# Patient Record
Sex: MALE | Race: WHITE | Employment: OTHER | URBAN - NONMETROPOLITAN AREA
[De-identification: names, ages, dates, MRNs, and addresses within clinical notes are randomized per-mention and may not be internally consistent; named-entity substitution may affect disease eponyms.]

---

## 2017-01-23 ENCOUNTER — OFFICE VISIT (OUTPATIENT)
Dept: FAMILY MEDICINE CLINIC | Age: 72
End: 2017-01-23

## 2017-01-23 VITALS
BODY MASS INDEX: 26.16 KG/M2 | DIASTOLIC BLOOD PRESSURE: 72 MMHG | WEIGHT: 167 LBS | RESPIRATION RATE: 16 BRPM | SYSTOLIC BLOOD PRESSURE: 132 MMHG | HEART RATE: 74 BPM

## 2017-01-23 DIAGNOSIS — M25.562 LATERAL KNEE PAIN, LEFT: Primary | ICD-10-CM

## 2017-01-23 DIAGNOSIS — M25.562 ACUTE PAIN OF LEFT KNEE: ICD-10-CM

## 2017-01-23 DIAGNOSIS — Z12.11 COLON CANCER SCREENING: ICD-10-CM

## 2017-01-23 PROCEDURE — 99213 OFFICE O/P EST LOW 20 MIN: CPT | Performed by: FAMILY MEDICINE

## 2017-01-23 ASSESSMENT — ENCOUNTER SYMPTOMS
SHORTNESS OF BREATH: 0
SINUS PRESSURE: 0
RHINORRHEA: 0
WHEEZING: 0
ABDOMINAL PAIN: 0
VOMITING: 0
SORE THROAT: 0
DIARRHEA: 0
CHEST TIGHTNESS: 0
COUGH: 0
ALLERGIC/IMMUNOLOGIC NEGATIVE: 1
CONSTIPATION: 0
BACK PAIN: 0
NAUSEA: 0
BLOOD IN STOOL: 0

## 2017-02-01 ENCOUNTER — TELEPHONE (OUTPATIENT)
Dept: FAMILY MEDICINE CLINIC | Age: 72
End: 2017-02-01

## 2017-02-01 DIAGNOSIS — M25.562 ACUTE PAIN OF LEFT KNEE: Primary | ICD-10-CM

## 2017-02-10 ENCOUNTER — HOSPITAL ENCOUNTER (OUTPATIENT)
Dept: MRI IMAGING | Age: 72
Discharge: HOME | End: 2017-02-10
Attending: ORTHOPAEDIC SURGERY | Admitting: ORTHOPAEDIC SURGERY

## 2017-02-10 ENCOUNTER — OFFICE VISIT (OUTPATIENT)
Dept: ORTHOPEDIC SURGERY | Age: 72
End: 2017-02-10

## 2017-02-10 VITALS — RESPIRATION RATE: 16 BRPM | HEIGHT: 67 IN | BODY MASS INDEX: 26.21 KG/M2 | WEIGHT: 167 LBS

## 2017-02-10 DIAGNOSIS — S83.282A ACUTE TEAR LATERAL MENISCUS, LEFT, INITIAL ENCOUNTER: ICD-10-CM

## 2017-02-10 DIAGNOSIS — S83.282A TEAR OF LATERAL MENISCUS OF LEFT KNEE, UNSPECIFIED TEAR TYPE, UNSPECIFIED WHETHER OLD OR CURRENT TEAR, INITIAL ENCOUNTER: Primary | ICD-10-CM

## 2017-02-10 DIAGNOSIS — M17.12 PRIMARY OSTEOARTHRITIS OF LEFT KNEE: ICD-10-CM

## 2017-02-10 DIAGNOSIS — M25.562 LEFT KNEE PAIN, UNSPECIFIED CHRONICITY: ICD-10-CM

## 2017-02-10 DIAGNOSIS — S83.282A OTHER TEAR OF LATERAL MENISCUS, CURRENT INJURY, LEFT KNEE, INITIAL ENCOUNTER: ICD-10-CM

## 2017-02-10 PROCEDURE — 99204 OFFICE O/P NEW MOD 45 MIN: CPT | Performed by: ORTHOPAEDIC SURGERY

## 2017-02-10 ASSESSMENT — ENCOUNTER SYMPTOMS
RESPIRATORY NEGATIVE: 1
BLURRED VISION: 1
CONSTIPATION: 1

## 2017-02-13 ENCOUNTER — TELEPHONE (OUTPATIENT)
Dept: ORTHOPEDIC SURGERY | Age: 72
End: 2017-02-13

## 2017-02-15 ENCOUNTER — OFFICE VISIT (OUTPATIENT)
Dept: ORTHOPEDIC SURGERY | Age: 72
End: 2017-02-15

## 2017-02-15 VITALS — RESPIRATION RATE: 16 BRPM | BODY MASS INDEX: 26.21 KG/M2 | HEIGHT: 67 IN | WEIGHT: 167 LBS

## 2017-02-15 DIAGNOSIS — M17.12 PRIMARY OSTEOARTHRITIS OF LEFT KNEE: Primary | ICD-10-CM

## 2017-02-15 DIAGNOSIS — M25.562 ACUTE PAIN OF LEFT KNEE: ICD-10-CM

## 2017-02-15 PROCEDURE — 20610 DRAIN/INJ JOINT/BURSA W/O US: CPT | Performed by: ORTHOPAEDIC SURGERY

## 2017-02-15 PROCEDURE — 99213 OFFICE O/P EST LOW 20 MIN: CPT | Performed by: ORTHOPAEDIC SURGERY

## 2017-02-21 ENCOUNTER — HOSPITAL ENCOUNTER (OUTPATIENT)
Dept: PHYSICAL THERAPY | Age: 72
Discharge: HOME | End: 2017-02-28
Attending: ORTHOPAEDIC SURGERY | Admitting: ORTHOPAEDIC SURGERY

## 2017-02-21 ASSESSMENT — PAIN DESCRIPTION - ORIENTATION: ORIENTATION: LEFT

## 2017-02-21 ASSESSMENT — PAIN DESCRIPTION - DESCRIPTORS: DESCRIPTORS: ACHING

## 2017-02-21 ASSESSMENT — PAIN DESCRIPTION - FREQUENCY: FREQUENCY: INTERMITTENT

## 2017-02-21 ASSESSMENT — PAIN DESCRIPTION - PAIN TYPE: TYPE: ACUTE PAIN

## 2017-02-21 ASSESSMENT — PAIN DESCRIPTION - LOCATION: LOCATION: KNEE

## 2017-02-21 ASSESSMENT — PAIN SCALES - GENERAL: PAINLEVEL_OUTOF10: 4

## 2017-03-01 ENCOUNTER — HOSPITAL ENCOUNTER (OUTPATIENT)
Dept: PHYSICAL THERAPY | Age: 72
Discharge: HOME | End: 2017-03-24
Attending: ORTHOPAEDIC SURGERY | Admitting: ORTHOPAEDIC SURGERY

## 2017-05-19 ENCOUNTER — TELEPHONE (OUTPATIENT)
Dept: ORTHOPEDIC SURGERY | Age: 72
End: 2017-05-19

## 2017-05-19 ENCOUNTER — OFFICE VISIT (OUTPATIENT)
Dept: ORTHOPEDIC SURGERY | Age: 72
End: 2017-05-19

## 2017-05-19 VITALS — BODY MASS INDEX: 26.21 KG/M2 | WEIGHT: 167 LBS | HEIGHT: 67 IN | RESPIRATION RATE: 16 BRPM

## 2017-05-19 DIAGNOSIS — S83.282A TEAR OF LATERAL MENISCUS OF LEFT KNEE, UNSPECIFIED TEAR TYPE, UNSPECIFIED WHETHER OLD OR CURRENT TEAR, INITIAL ENCOUNTER: Primary | ICD-10-CM

## 2017-05-19 DIAGNOSIS — M17.12 PRIMARY OSTEOARTHRITIS OF LEFT KNEE: ICD-10-CM

## 2017-05-19 PROCEDURE — 99214 OFFICE O/P EST MOD 30 MIN: CPT | Performed by: ORTHOPAEDIC SURGERY

## 2017-05-19 PROCEDURE — 20610 DRAIN/INJ JOINT/BURSA W/O US: CPT | Performed by: ORTHOPAEDIC SURGERY

## 2017-05-31 ENCOUNTER — TELEPHONE (OUTPATIENT)
Dept: ORTHOPEDIC SURGERY | Age: 72
End: 2017-05-31

## 2017-09-25 ENCOUNTER — OFFICE VISIT (OUTPATIENT)
Dept: ORTHOPEDIC SURGERY | Age: 72
End: 2017-09-25

## 2017-09-25 VITALS — WEIGHT: 167 LBS | BODY MASS INDEX: 26.21 KG/M2 | RESPIRATION RATE: 16 BRPM | HEIGHT: 67 IN

## 2017-09-25 DIAGNOSIS — S83.242A ACUTE MEDIAL MENISCUS TEAR OF LEFT KNEE, INITIAL ENCOUNTER: Primary | ICD-10-CM

## 2017-09-25 DIAGNOSIS — M17.12 PRIMARY OSTEOARTHRITIS OF LEFT KNEE: ICD-10-CM

## 2017-09-25 DIAGNOSIS — Z01.818 PREOP EXAMINATION: ICD-10-CM

## 2017-09-25 PROCEDURE — 99214 OFFICE O/P EST MOD 30 MIN: CPT | Performed by: ORTHOPAEDIC SURGERY

## 2017-09-25 PROCEDURE — 73562 X-RAY EXAM OF KNEE 3: CPT | Performed by: ORTHOPAEDIC SURGERY

## 2017-09-27 ENCOUNTER — TELEPHONE (OUTPATIENT)
Dept: ORTHOPEDIC SURGERY | Age: 72
End: 2017-09-27

## 2017-09-27 DIAGNOSIS — S83.242A ACUTE MEDIAL MENISCUS TEAR OF LEFT KNEE, INITIAL ENCOUNTER: Primary | ICD-10-CM

## 2017-09-27 DIAGNOSIS — Z01.818 PREOP EXAMINATION: ICD-10-CM

## 2017-09-27 DIAGNOSIS — M17.12 PRIMARY OSTEOARTHRITIS OF LEFT KNEE: ICD-10-CM

## 2017-10-04 ENCOUNTER — TELEPHONE (OUTPATIENT)
Dept: ORTHOPEDIC SURGERY | Age: 72
End: 2017-10-04
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
## 2017-10-04 NOTE — TELEPHONE ENCOUNTER
Called patients insurance to pre cert CPT code 60586 for [address] 10/10/17.  Per automated system no pre cert is required K#[id number]

## 2017-10-05 ENCOUNTER — HOSPITAL ENCOUNTER (OUTPATIENT)
Dept: PREADMISSION TESTING | Age: 72
Discharge: HOME | End: 2017-10-05
Attending: ORTHOPAEDIC SURGERY | Admitting: ORTHOPAEDIC SURGERY

## 2017-10-05 VITALS
BODY MASS INDEX: 25.79 KG/M2 | RESPIRATION RATE: 16 BRPM | WEIGHT: 160.5 LBS | SYSTOLIC BLOOD PRESSURE: 141 MMHG | TEMPERATURE: 98.2 F | HEIGHT: 66 IN | DIASTOLIC BLOOD PRESSURE: 67 MMHG | OXYGEN SATURATION: 97 % | HEART RATE: 72 BPM

## 2017-10-05 LAB
ANION GAP SERPL CALCULATED.3IONS-SCNC: 11 MMOL/L (ref 4–16)
BUN BLDV-MCNC: 14 MG/DL (ref 6–23)
CALCIUM SERPL-MCNC: 9.6 MG/DL (ref 8.3–10.6)
CHLORIDE BLD-SCNC: 102 MMOL/L (ref 99–110)
CO2: 26 MMOL/L (ref 21–32)
CREAT SERPL-MCNC: 1 MG/DL (ref 0.9–1.3)
EKG ATRIAL RATE: 63 BPM
EKG DIAGNOSIS: NORMAL
EKG P AXIS: 18 DEGREES
EKG P-R INTERVAL: 192 MS
EKG Q-T INTERVAL: 390 MS
EKG QRS DURATION: 98 MS
EKG QTC CALCULATION (BAZETT): 399 MS
EKG R AXIS: -26 DEGREES
EKG T AXIS: 29 DEGREES
EKG VENTRICULAR RATE: 63 BPM
GFR AFRICAN AMERICAN: >60 ML/MIN/1.73M2
GFR NON-AFRICAN AMERICAN: >60 ML/MIN/1.73M2
GLUCOSE BLD-MCNC: 94 MG/DL (ref 70–140)
HCT VFR BLD CALC: 37.1 % (ref 42–52)
HEMOGLOBIN: 11.5 GM/DL (ref 13.5–18)
MCH RBC QN AUTO: 26.2 PG (ref 27–31)
MCHC RBC AUTO-ENTMCNC: 31 % (ref 32–36)
MCV RBC AUTO: 84.5 FL (ref 78–100)
PDW BLD-RTO: 14.5 % (ref 11.7–14.9)
PLATELET # BLD: 351 K/CU MM (ref 140–440)
PMV BLD AUTO: 9.9 FL (ref 7.5–11.1)
POTASSIUM SERPL-SCNC: 5.1 MMOL/L (ref 3.5–5.1)
RBC # BLD: 4.39 M/CU MM (ref 4.6–6.2)
SODIUM BLD-SCNC: 139 MMOL/L (ref 135–145)
WBC # BLD: 7.2 K/CU MM (ref 4–10.5)

## 2017-10-05 ASSESSMENT — PAIN SCALES - GENERAL: PAINLEVEL_OUTOF10: 0

## 2017-10-05 NOTE — PROGRESS NOTES
I called patient to verify arrival time of 0630 for 0730 surgery on 10- with Dr. Panfilo Jiang and [facility] location. Patient asked if he needed to have transportation home after the surgery. I explained to patient he must have a ride home and someone to check on him after his surgery. Patient stated he has no one who can do this at the present time but is working on finding a ride. Patient states his son, who lives in [place], is unable to provide assistance. I suggested to check with the [address] [place] [address] and [employer] for a ride and patient stated he was waiting to hear back from his neighbor. Plan is to check with patient again tomorrow, and if needed, on Monday 10- to ensure he has transportation and someone to check on him after surgery.

## 2017-10-05 NOTE — PROGRESS NOTES
Patient called back for [facility] address because he is trying to arrange transportation through [facility] [employer]. I asked patient again about having someone to check on him following the surgery and he stated he could take care of himself. I told patient he would not able to have the surgery if he did not have someone to check on him afterwards on the surgery day. He voiced understanding and stated he would find someone to check on him. Patient also asked about deducting the cost of crutches if he provided his own. I referred patient to the Billing department at [phone]. Plan is still to check with patient tomorrow and again Monday if needed.

## 2017-10-09 NOTE — H&P
tenderness, deformity or injury. Range of motion is unremarkable. There is no gross instability. There are no rashes, ulcerations or lesions. Strength and tone are normal.      MEDICAL DATA:   MRI LEFT KNEE 2/10/17      Impression   1. No evidence of internal derangement. 2. Mild medial compartment degenerative changes. 3. Trace joint effusion and small popliteal cyst.         Knee x-ray:  3 view(s) of the left knee were obtained and reviewed and show No fracture, Normal alignment, No foreign body      ASSESSMENT:     1. Acute medial meniscus tear of left knee, initial encounter     2. Primary osteoarthritis of left knee  XR KNEE LEFT (MIN 4 VIEWS)   3. Preop examination           PLAN:   Diagnostic and treatment options discussed. Diagnosis explained. Natural history discussed. Patient's questions answered. No meds list provided   Consent signed today   Follow up as scheduled for surgery     Informed Consent for -  Left knee arthroscopic partial medial meniscectomy and related procedures per findings: The risks, benefits, potential complications, and potential outcomes of surgical and non-surgical treatment(s) were discussed at length with the patient and family members present. They understood and have had all of their questions answered to their satisfaction. They understand that no warrantee can be given regarding the outcome of the treatment(s). They agree to proceed despite all known and unknown risks and associated convalescence.        HISTORY AND PHYSICAL UPDATE:    H&P reviewed. The patient was examined and there are no changes to the H&P.       Electronically signed by Hans Martinez DO on 10/10/2017 at 7:29 AM

## 2017-10-10 ENCOUNTER — TELEPHONE (OUTPATIENT)
Dept: ORTHOPEDIC SURGERY | Age: 72
End: 2017-10-10

## 2017-10-10 ENCOUNTER — HOSPITAL ENCOUNTER (OUTPATIENT)
Dept: SURGERY | Age: 72
Discharge: HOME | End: 2017-10-10
Attending: ORTHOPAEDIC SURGERY | Admitting: ORTHOPAEDIC SURGERY

## 2017-10-10 VITALS
HEART RATE: 72 BPM | DIASTOLIC BLOOD PRESSURE: 72 MMHG | OXYGEN SATURATION: 100 % | TEMPERATURE: 97 F | BODY MASS INDEX: 26.82 KG/M2 | WEIGHT: 161 LBS | HEIGHT: 65 IN | RESPIRATION RATE: 16 BRPM | SYSTOLIC BLOOD PRESSURE: 128 MMHG

## 2017-10-10 PROCEDURE — 29881 ARTHRS KNE SRG MNISECTMY M/L: CPT | Performed by: ORTHOPAEDIC SURGERY

## 2017-10-10 RX ORDER — SODIUM CHLORIDE, SODIUM LACTATE, POTASSIUM CHLORIDE, CALCIUM CHLORIDE 600; 310; 30; 20 MG/100ML; MG/100ML; MG/100ML; MG/100ML
INJECTION, SOLUTION INTRAVENOUS CONTINUOUS
Status: DISCONTINUED | OUTPATIENT
Start: 2017-10-10 | End: 2017-10-11 | Stop reason: HOSPADM

## 2017-10-10 RX ORDER — SODIUM CHLORIDE 0.9 % (FLUSH) 0.9 %
10 SYRINGE (ML) INJECTION PRN
Status: DISCONTINUED | OUTPATIENT
Start: 2017-10-10 | End: 2017-10-11 | Stop reason: HOSPADM

## 2017-10-10 RX ORDER — HYDROCODONE BITARTRATE AND ACETAMINOPHEN 5; 325 MG/1; MG/1
1 TABLET ORAL ONCE
Status: DISCONTINUED | OUTPATIENT
Start: 2017-10-10 | End: 2017-10-11 | Stop reason: HOSPADM

## 2017-10-10 RX ORDER — HYDROCODONE BITARTRATE AND ACETAMINOPHEN 5; 325 MG/1; MG/1
1 TABLET ORAL EVERY 4 HOURS PRN
Qty: 40 TABLET | Refills: 0 | Status: SHIPPED | OUTPATIENT
Start: 2017-10-10 | End: 2018-01-17

## 2017-10-10 RX ORDER — SODIUM CHLORIDE 0.9 % (FLUSH) 0.9 %
10 SYRINGE (ML) INJECTION EVERY 12 HOURS SCHEDULED
Status: DISCONTINUED | OUTPATIENT
Start: 2017-10-10 | End: 2017-10-11 | Stop reason: HOSPADM

## 2017-10-10 RX ORDER — IBUPROFEN 400 MG/1
800 TABLET ORAL ONCE
Status: DISCONTINUED | OUTPATIENT
Start: 2017-10-10 | End: 2017-10-11 | Stop reason: HOSPADM

## 2017-10-10 RX ORDER — ONDANSETRON 2 MG/ML
4 INJECTION INTRAMUSCULAR; INTRAVENOUS EVERY 6 HOURS PRN
Status: DISCONTINUED | OUTPATIENT
Start: 2017-10-10 | End: 2017-10-11 | Stop reason: HOSPADM

## 2017-10-10 RX ADMIN — SODIUM CHLORIDE, SODIUM LACTATE, POTASSIUM CHLORIDE, CALCIUM CHLORIDE: 600; 310; 30; 20 INJECTION, SOLUTION INTRAVENOUS at 07:25

## 2017-10-10 ASSESSMENT — PAIN SCALES - GENERAL
PAINLEVEL_OUTOF10: 0

## 2017-10-10 ASSESSMENT — PAIN - FUNCTIONAL ASSESSMENT: PAIN_FUNCTIONAL_ASSESSMENT: 0-10

## 2017-10-10 NOTE — PROGRESS NOTES
[id number] Transportation returned call that they received message to head this way. Pt will be ready for  after he voids. 0945 Pt has feeling in bilat legs. Push and pull equal with strength. Denies pain, nausea. 8986 Up to bathroom with assistance. Call light in reach. 0061 Returned to room. Unable to void. More coffee given. Pt denies needs. Call light in reach. Warm blankets given.
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
## 2017-10-10 NOTE — OP NOTE
prominences were  well padded. Left lower extremity was then sterilely prepped and  draped in usual fashion after being placed in an arthroscopic knee  talamantes. A standard anterolateral arthroscopic portal was established. The arthroscope was inserted into the patellofemoral joint. The knee  was inflated with arthroscopic fluid and well visualized. There was  grade III chondromalacia of the small isolated area of the central  portion of the patella articular surface. There does not appear to be  any significant chondral surface wear on the opposing articular  surface of the femur. Mild synovitis was present in this area. The  arthroscope was advanced down the medial gutter. No loose bodies were  identified. The medial compartment was gapped and the arthroscope was  directed here. Generalized degenerative prostheses were identified in  this compartment. There was grade II chondromalacia of the medial  tibial plateau and grade III chondromalacia of the weightbearing  surface of the medial femoral condyle. The medial meniscus is intact. The knee was flexed. Anterior cruciate ligament was visualized. This  was normal in appearance. The lateral compartment was gapped and the  arthroscope was directed here. There was a tear of the free edge of  the mid portion of the lateral meniscus that does not appear to track  back into the more peripheral portions of the meniscus. The chondral  surfaces of the lateral tibial plateau and lateral femoral condyle  were intact.   The arthroscope was then brought back over to the medial  compartment and using an outside-in technique with an 18-gauge spinal  needle and an 11-blade scalpel, standard anteromedial arthroscopic  portal was established as the working portal.  The arthroscopic shaver  was inserted through this newly created medial portal.  The shaver and  camera were redirected to the lateral compartment where the partial  lateral meniscectomy was performed back to stable margins. The medial  compartment was gapped and the arthroscope and shaver were directed to  this area. The arthroscopic shaver was used to perform chondroplasty  of the medial femoral condyle back to stable margins. The knee was  then extended. The arthroscope was advanced back up to face of the  femoral condyles and into the patellofemoral compartment. The  arthroscopic shaver was used to perform the chondroplasty of the  patella back to stable margins. At this time, arthroscopic fluid was  evacuated from the knee. 20 mL of 0.25% Marcaine with epinephrine was  injected into the knee for postoperative pain and bleeding control. Instrumentation was removed from the knee. Portals were closed with  nylon sutures. Sterile dressings were applied and the patient was  sent to recovery in stable condition. DISPOSITION:  Patient was given written instructions on how to care  for himself after the surgery. He was given a written remainder to  follow up with me in the office in approximately two weeks. He was  given a written prescription for Norco 5/325 one p.o. q. 4h. p.r.n.  pain number 40 with no refills. Intraoperative and postoperative  course was discussed with the patient in the recovery area. He  understood and had all of his questions answered.       CASSANDRA MARTINES DO    D: 10/10/2017 8:42:11       T: 10/10/2017 9:29:20     [email or web address]  Job#: [id number]     Doc#: [id number]    CC:  Allan Aguila MD

## 2017-10-11 ENCOUNTER — OFFICE VISIT (OUTPATIENT)
Dept: ORTHOPEDIC SURGERY | Age: 72
End: 2017-10-11

## 2017-10-11 DIAGNOSIS — Z98.890 POST-OPERATIVE STATE: Primary | ICD-10-CM

## 2017-10-11 DIAGNOSIS — S83.242A ACUTE MEDIAL MENISCUS TEAR OF LEFT KNEE, INITIAL ENCOUNTER: ICD-10-CM

## 2017-10-11 PROCEDURE — 99024 POSTOP FOLLOW-UP VISIT: CPT | Performed by: ORTHOPAEDIC SURGERY

## 2017-10-11 NOTE — PROGRESS NOTES
Scribe Authentication Statement  Laila Geiger scribed portions of this documentation for and in the presence of Dr. Yunior Frazier DO on 10/11/17 at 2:22 PM.    Provider Authentication Statement:  ITony DO, personally performed the services described in this documentation and they were scribed in my presence by the above listed scribe. The documentation is both accurate and complete. ORTHOPEDIC FOLLOW UP AFTER SURGERY    HISTORY OF PRESENT ILLNESS (HPI):   Surendra Stoner is a 74y.o. year old male who is here for follow up of:  1. Post-operative state    2. Acute medial meniscus tear of left knee, initial encounter        Procedure Name: PROCEDURE NAMES:  1. Left knee arthroscopic partial lateral meniscectomy. 2.  Left knee arthroscopic chondroplasty of the medial femoral  condyle. 3.  Left knee arthroscopic chondroplasty of the patella. Surgery Date: October/10/2017   Post-Op Number: 1 day(s)   How has the problem changed since the last visit: intermittent   How have the associated manifestations changed since the last visit: stiffness and swelling  waxing and waning   New associated manifestations are: none   Adverse effects or complications: Tightness in back of knee. Denies pain or tightness in the calf or lower leg. Denies swelling or inability to walk. Denies calf tenderness. Other Comments:  patient would like dr Darion Schmitt to look at the calf     Current Outpatient Prescriptions   Medication Sig Dispense Refill    HYDROcodone-acetaminophen (NORCO) 5-325 MG per tablet Take 1 tablet by mouth every 4 hours as needed for Pain . 40 tablet 0    Multiple Vitamins-Minerals (ICAPS AREDS 2 PO) Take by mouth every morning Over The Counter      diclofenac sodium (VOLTAREN) 1 % GEL Apply 4 g topically 4 times daily 5 Tube 0     No current facility-administered medications for this visit.         ORTHOPEDIC EXAM:     [] Left Upper Extremity [] Right Upper Extremity  [] Left Lower Extremity [] Right Lower Extremity    Inspection:  Incision: [x]Healing well without significant drainage, dehiscence, or significant erythema. []Erythema greater than expected:  []Dehiscence:  []Drainage:   Circulation: [x] Warm, well perfused, good capillary refill  [] Cool  [] Sluggish cap refill  [] Other:   Skin: [x] Intact without discoloration  [] Pale  [] Erythematous  [] Ecchymotic   [] Maceration  [] Other:  [] Wound:   Cast/Splint: [x] None  [] Intact, dry, and functional without any unexpected wear  [] Poorly fitting  [] Wet  [] Foul smelling  [] Broken  [] Patient altered  [] Foreign body within  [] Soft  [] Other:     Palpation:  Tenderness: [] None  [x] No more than expected  [] Pertinent positives & negatives:   Swelling: [] None  [x] No more than expected  [] Pertinent positives & negatives:   Calor (Heat) [x] None  [] No more than expected  [] Location:   Other Findings: -No palpable cords in the calf. No calf tenderness. No calf swelling. No erythema. Range of Motion:  [] ROM deferred due to recent surgery   Active ROM: [x] Full functional  [] Measured =   [] Mildly limited  [] Moderately limited  [] Severely limited   Passive ROM: [x] Full functional  [] Measured =  [] Mildly limited  [] Moderately limited  [] Severely limited     Motor Function:  [x]Intact. No focal motor deficits. []Impaired:       Sensation:  Sensation to light touch: [x] Intact  [] Diminished:  [] Absent:       MEDICAL DATA:   Imaging:  No x-rays taken in the office today. ASSESSMENT / PLAN:     1. Post-operative state     2.  Acute medial meniscus tear of left knee, initial encounter       Keep scheduled f/u appointment  Keep schedule PT appointments      Electronically signed by Madalyn Vasquez DO on 10/11/2017 at 2:34 PM

## 2017-10-12 ENCOUNTER — HOSPITAL ENCOUNTER (OUTPATIENT)
Dept: PHYSICAL THERAPY | Age: 72
Discharge: HOME | End: 2017-10-31
Attending: ORTHOPAEDIC SURGERY | Admitting: ORTHOPAEDIC SURGERY

## 2017-10-12 ASSESSMENT — PAIN DESCRIPTION - PAIN TYPE: TYPE: SURGICAL PAIN

## 2017-10-12 ASSESSMENT — PAIN SCALES - GENERAL: PAINLEVEL_OUTOF10: 2
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
## 2017-10-16 NOTE — ANESTHESIA POST-OP
Anesthesia Post-op Note    Patient: Adelso Pride  MRN: [id number]  YOB: 1945  Date of evaluation: 10/16/2017  Time:  4:27 PM     Procedure(s) Performed: Left Knee Arthroscopy    Last Vitals: /72   Pulse 72   Temp 97 °F (36.1 °C) (Temporal)   Resp 16   Ht 5' 5\" (1.651 m)   Wt 161 lb (73 kg)   SpO2 100%   BMI 26.79 kg/m²     Jluis Phase I:      Jluis Phase II: Jluis Score: 10    Anesthesia Post Evaluation    Final anesthesia type: general  Patient location during evaluation: PACU  Patient participation: complete - patient participated  Level of consciousness: awake and alert  Pain score: 0  Airway patency: patent  Nausea & Vomiting: no nausea and no vomiting  Complications: no  Cardiovascular status: hemodynamically stable  Respiratory status: acceptable  Hydration status: euvolemic        Denver Lemos CRNA  4:27 PM

## 2017-10-18 ENCOUNTER — TELEPHONE (OUTPATIENT)
Dept: FAMILY MEDICINE CLINIC | Age: 72
End: 2017-10-18

## 2017-10-18 ENCOUNTER — HOSPITAL ENCOUNTER (OUTPATIENT)
Dept: PHYSICAL THERAPY | Age: 72
Discharge: HOME | End: 2017-10-18
Admitting: ORTHOPAEDIC SURGERY

## 2017-10-18 NOTE — FLOWSHEET NOTE
Outpatient Physical Therapy           [place]           [] Phone: [phone]   Fax: [phone]  [facility]           [x] Phone: [phone]   Fax: [phone]    Physical Therapy Daily Treatment Note  Date:  10/18/2017    Patient Name:  Zoey Osorio    :  1945  MRN: [id number]  Restrictions/Precautions:   Diagnosis:   Diagnosis: post op arthroscopy - partial lateral menisectomy  Date of Surgery: 10/5/17  Treatment Diagnosis:    same  Insurance/Certification information:    Referring Physician:  Referring Practitioner: Dr. Olga Navarro  Next Doctor Visit:    Plan of care signed (Y/N): n   Visit# / total visits: 2 /4  Pain level: 0/10   Goals:          Long term goals  Time Frame for Long term goals : 4 weeks  Long term goal 1: I in home program.  Long term goal 2: up/down one floor of steps w reciprocal pattern. Long term goal 3: amb without antalgic pattern. Long term goal 4: squat without pain. Subjective:  Patient states that his knee is feeling good. Denies any pain with activities at home. Continues to have hiccups.       Any changes in Ambulatory Summary Sheet? n      Objective:      Good form noted with exercises    Exercises:  Exercise/Equipment Date 10/12/17 10/18/17 Date Date     Heel slide x10 w 10ct hold        SLR 2x10        bridging 2x10 Bridge w SLR      wall squats   20x       Standing hip 4 way  20x       Lateral step ups  6\" 20x        Check stair negotiation  5 laps with reciprocal pattern              Nustep    5 min, lev 5                                                                                                     Other Therapeutic Activities/Education:      Home Exercise Program:      Modality/intervention used:    [x] Therapeutic Exercise  [] Modalities:  [] Therapeutic Activity     [] Ultrasound  [] Elec  Stim  [] Gait Training      [] Cervical Traction [] Lumbar Traction  [] Neuromuscular Re-education    [] Cold/hotpack [] Iontophoresis   [x] Instruction in

## 2017-10-18 NOTE — TELEPHONE ENCOUNTER
Patient called stating he had surgery 10/10/2017 , and he has had hiccups ever since. Patient stated that he has tried OTC antacid and nothing works, he wanting to know if he can have something prescribed for it or does he have to come in? Please advise.

## 2017-10-23 ENCOUNTER — OFFICE VISIT (OUTPATIENT)
Dept: ORTHOPEDIC SURGERY | Age: 72
End: 2017-10-23

## 2017-10-23 DIAGNOSIS — Z98.890 POST-OPERATIVE STATE: Primary | ICD-10-CM

## 2017-10-23 PROCEDURE — 99024 POSTOP FOLLOW-UP VISIT: CPT | Performed by: PHYSICIAN ASSISTANT

## 2017-10-24 NOTE — PROGRESS NOTES
Franc Beltrán  [id number]  October 24, 2017    Chief Complaint   Patient presents with    Follow-up     L knee scope       Date of surgery:   October 10th, 2017    History:  Mr. Franc Beltrán is here in follow up regarding his left knee:  1. Left knee arthroscopic partial lateral meniscectomy. 2.  Left knee arthroscopic chondroplasty of the medial femoral  condyle. 3.  Left knee arthroscopic chondroplasty of the patella. He is doing rather well. He is having 2/10 pain. He has discontinued the assistive devices for ambulation. He denies chest pain, SOB, calf pain. No other issues. The patient was not seen in conjunction with PT today. Physical: He demonstrates appropriate mood and affect. The left leg exam:  The incisions are clean, dry, intact and nontender. Sutures intact. He has minimal swelling. There are No cords or calf tenderness. No significant calf/ankle edema. He is neurovascularly intact distally.     Impression: Status post above, doing well    Plan:   -sutures removed, Band-aids applied  -continue the PT protocol  -rest/ice/elevation as needed  -f/u sooner prn any issues    Matt Engle PA-C

## 2017-11-01 ENCOUNTER — HOSPITAL ENCOUNTER (OUTPATIENT)
Dept: PHYSICAL THERAPY | Age: 72
Discharge: HOME | End: 2017-11-30
Attending: ORTHOPAEDIC SURGERY | Admitting: ORTHOPAEDIC SURGERY

## 2018-01-17 ENCOUNTER — OFFICE VISIT (OUTPATIENT)
Dept: FAMILY MEDICINE CLINIC | Age: 73
End: 2018-01-17

## 2018-01-17 VITALS
BODY MASS INDEX: 29.22 KG/M2 | HEART RATE: 97 BPM | SYSTOLIC BLOOD PRESSURE: 162 MMHG | RESPIRATION RATE: 16 BRPM | WEIGHT: 175.6 LBS | TEMPERATURE: 99.1 F | DIASTOLIC BLOOD PRESSURE: 80 MMHG | OXYGEN SATURATION: 96 %

## 2018-01-17 DIAGNOSIS — J40 BRONCHITIS: Primary | ICD-10-CM

## 2018-01-17 DIAGNOSIS — Z12.11 SCREENING FOR COLON CANCER: ICD-10-CM

## 2018-01-17 DIAGNOSIS — I25.10 ASCVD (ARTERIOSCLEROTIC CARDIOVASCULAR DISEASE): ICD-10-CM

## 2018-01-17 DIAGNOSIS — I10 ESSENTIAL HYPERTENSION: ICD-10-CM

## 2018-01-17 DIAGNOSIS — R05.9 COUGH: ICD-10-CM

## 2018-01-17 PROBLEM — M17.12 PRIMARY OSTEOARTHRITIS OF LEFT KNEE: Status: RESOLVED | Noted: 2017-02-15 | Resolved: 2018-01-17

## 2018-01-17 PROBLEM — S83.282A TEAR OF LATERAL MENISCUS OF LEFT KNEE: Status: RESOLVED | Noted: 2017-05-19 | Resolved: 2018-01-17

## 2018-01-17 PROCEDURE — G8419 CALC BMI OUT NRM PARAM NOF/U: HCPCS | Performed by: FAMILY MEDICINE

## 2018-01-17 PROCEDURE — 1036F TOBACCO NON-USER: CPT | Performed by: FAMILY MEDICINE

## 2018-01-17 PROCEDURE — G8599 NO ASA/ANTIPLAT THER USE RNG: HCPCS | Performed by: FAMILY MEDICINE

## 2018-01-17 PROCEDURE — 3017F COLORECTAL CA SCREEN DOC REV: CPT | Performed by: FAMILY MEDICINE

## 2018-01-17 PROCEDURE — 4040F PNEUMOC VAC/ADMIN/RCVD: CPT | Performed by: FAMILY MEDICINE

## 2018-01-17 PROCEDURE — 99213 OFFICE O/P EST LOW 20 MIN: CPT | Performed by: FAMILY MEDICINE

## 2018-01-17 PROCEDURE — G8484 FLU IMMUNIZE NO ADMIN: HCPCS | Performed by: FAMILY MEDICINE

## 2018-01-17 PROCEDURE — G8427 DOCREV CUR MEDS BY ELIG CLIN: HCPCS | Performed by: FAMILY MEDICINE

## 2018-01-17 PROCEDURE — 1123F ACP DISCUSS/DSCN MKR DOCD: CPT | Performed by: FAMILY MEDICINE

## 2018-01-17 RX ORDER — PREDNISONE 20 MG/1
20 TABLET ORAL 2 TIMES DAILY
Qty: 10 TABLET | Refills: 0 | Status: SHIPPED | OUTPATIENT
Start: 2018-01-17 | End: 2018-01-24 | Stop reason: SDUPTHER

## 2018-01-17 RX ORDER — GUAIFENESIN AND CODEINE PHOSPHATE 100; 10 MG/5ML; MG/5ML
5 SOLUTION ORAL EVERY 4 HOURS PRN
Qty: 118 ML | Refills: 0 | Status: SHIPPED | OUTPATIENT
Start: 2018-01-17 | End: 2018-01-24 | Stop reason: SDUPTHER

## 2018-01-17 RX ORDER — AZITHROMYCIN 250 MG/1
TABLET, FILM COATED ORAL
Qty: 1 PACKET | Refills: 0 | Status: SHIPPED | OUTPATIENT
Start: 2018-01-17 | End: 2018-01-24 | Stop reason: SDUPTHER

## 2018-01-17 ASSESSMENT — ENCOUNTER SYMPTOMS
VOMITING: 0
SINUS PRESSURE: 0
NAUSEA: 0
ABDOMINAL PAIN: 0
COUGH: 1
DIARRHEA: 0
ALLERGIC/IMMUNOLOGIC NEGATIVE: 1
CHEST TIGHTNESS: 0
SORE THROAT: 0
SHORTNESS OF BREATH: 0
RHINORRHEA: 1
CONSTIPATION: 0
BACK PAIN: 0
BLOOD IN STOOL: 0
WHEEZING: 0

## 2018-01-17 ASSESSMENT — PATIENT HEALTH QUESTIONNAIRE - PHQ9
2. FEELING DOWN, DEPRESSED OR HOPELESS: 0
1. LITTLE INTEREST OR PLEASURE IN DOING THINGS: 0
SUM OF ALL RESPONSES TO PHQ QUESTIONS 1-9: 0
SUM OF ALL RESPONSES TO PHQ9 QUESTIONS 1 & 2: 0

## 2018-01-18 NOTE — PROGRESS NOTES
SUBJECTIVE:    HPI:   Essential hypertension  BP (!) 162/80 (Site: Right Arm, Position: Sitting, Cuff Size: Medium Adult)   Pulse 97   Temp 99.1 °F (37.3 °C)   Resp 16   Wt 175 lb 9.6 oz (79.7 kg)   SpO2 96%   BMI 29.22 kg/m²    Pressure up but using cold medicines and sick recheck 4 - 6 weeks    ASCVD (arteriosclerotic cardiovascular disease)  Hx tia, stable now , pt not interested in asa, statins    Bronchitis  Cough and congestion. Pt sick several weeks and worsening requesting med to ease symptoms      CHIEF COMPLAINT:    Chief Complaint   Patient presents with    Cough    Pharyngitis    Nasal Congestion       REVIEW OF SYSTEMS:    Review of Systems   Constitutional: Negative for activity change, fatigue and fever. HENT: Positive for rhinorrhea. Negative for congestion, ear pain, sinus pressure and sore throat. Eyes: Negative for visual disturbance. Respiratory: Positive for cough. Negative for chest tightness, shortness of breath and wheezing. Cardiovascular: Negative for chest pain, palpitations and leg swelling. Gastrointestinal: Negative for abdominal pain, blood in stool, constipation, diarrhea, nausea and vomiting. Endocrine: Negative. Genitourinary: Negative for dysuria, flank pain, hematuria and urgency. Musculoskeletal: Positive for arthralgias. Negative for back pain and joint swelling. L knee pain   Skin: Negative for rash. Allergic/Immunologic: Negative. Neurological: Negative for weakness, numbness and headaches. Hematological: Negative. Psychiatric/Behavioral: Negative. OBJECTIVE  PHYSICAL EXAM:    BP (!) 162/80 (Site: Right Arm, Position: Sitting, Cuff Size: Medium Adult)   Pulse 97   Temp 99.1 °F (37.3 °C)   Resp 16   Wt 175 lb 9.6 oz (79.7 kg)   SpO2 96%   BMI 29.22 kg/m²       Physical Exam   Constitutional: He is oriented to person, place, and time. He appears well-developed and well-nourished.    HENT:   Head: Normocephalic and for Cough for up to 7 days. Essential hypertension    Other orders  -     Cancel: Pneumococcal conjugate vaccine 13-valent PCV13       Essential hypertension  BP (!) 162/80 (Site: Right Arm, Position: Sitting, Cuff Size: Medium Adult)   Pulse 97   Temp 99.1 °F (37.3 °C)   Resp 16   Wt 175 lb 9.6 oz (79.7 kg)   SpO2 96%   BMI 29.22 kg/m²    Pressure up but using cold medicines and sick recheck 4 - 6 weeks    ASCVD (arteriosclerotic cardiovascular disease)  Hx tia, stable now , pt not interested in asa, statins    Bronchitis  Cough and congestion.   Pt sick several weeks and worsening requesting med to ease symptoms

## 2018-01-24 DIAGNOSIS — R05.9 COUGH: ICD-10-CM

## 2018-01-24 DIAGNOSIS — J40 BRONCHITIS: ICD-10-CM

## 2018-01-25 DIAGNOSIS — Z12.11 SCREENING FOR COLON CANCER: ICD-10-CM

## 2018-01-25 LAB
CONTROL: PRESENT
HEMOCCULT STL QL: NEGATIVE

## 2018-01-25 RX ORDER — GUAIFENESIN AND CODEINE PHOSPHATE 100; 10 MG/5ML; MG/5ML
5 SOLUTION ORAL EVERY 4 HOURS PRN
Qty: 118 ML | Refills: 0 | Status: SHIPPED | OUTPATIENT
Start: 2018-01-25 | End: 2018-02-01

## 2018-01-25 RX ORDER — AZITHROMYCIN 250 MG/1
TABLET, FILM COATED ORAL
Qty: 1 PACKET | Refills: 0 | Status: SHIPPED | OUTPATIENT
Start: 2018-01-25 | End: 2019-01-03 | Stop reason: ALTCHOICE

## 2018-01-25 RX ORDER — PREDNISONE 20 MG/1
20 TABLET ORAL 2 TIMES DAILY
Qty: 10 TABLET | Refills: 0 | Status: SHIPPED | OUTPATIENT
Start: 2018-01-25 | End: 2018-01-30

## 2018-01-28 NOTE — FLOWSHEET NOTE
Alert and oriented to person, place and time Outpatient Physical Therapy           [place]           [] Phone: [phone]   Fax: [phone]  [facility]           [x] Phone: [phone]   Fax: [phone]    Physical Therapy Daily Treatment Note  Date:  10/12/2017    Patient Name:  Jim Nowak    :  1945  MRN: [id number]  Restrictions/Precautions:   Diagnosis:   Diagnosis: post op arthroscopy - partial lateral menisectomy  Date of Surgery: 10/5/17  Treatment Diagnosis:    same  Insurance/Certification information:    Referring Physician:  Referring Practitioner: Dr. Jaspreet Jenkins  Next Doctor Visit:    Plan of care signed (Y/N): n   Visit# / total visits:   Pain level: 2-3/10   Goals:          Long term goals  Time Frame for Long term goals : 4 weeks  Long term goal 1: I in home program.  Long term goal 2: up/down one floor of steps w reciprocal pattern. Long term goal 3: amb without antalgic pattern. Long term goal 4: squat without pain. Subjective:  Pt presents with complaints of L knee pain following knee surgery 10/10/17. His incisions are healing and closed with sutures. No signs of infection, no drainage. He has some typical flexion ROM loss. He has an antalgic gait pattern. He should recover well.         Any changes in Ambulatory Summary Sheet? n      Objective:      ROM improved from 110 to 117    Exercises:  Exercise/Equipment Date 10/12/17 Date Date Date     Heel slide x10 w 10ct hold        SLR 2x10        bridging 2x10 Bridge w SLR      wall squats          Standing hip 4 way         Lateral step ups         Check stair negotiation                                                                                                                        Other Therapeutic Activities/Education:      Home Exercise Program:      Modality/intervention used:    [x] Therapeutic Exercise  [] Modalities:  [] Therapeutic Activity     [] Ultrasound  [] Elec  Stim  [] Gait Training      [] Cervical Traction [] Lumbar Traction  [] Neuromuscular Re-education    [] Cold/hotpack [] Iontophoresis   [x] Instruction in HEP      [] Vasopneumatic     [] Manual Therapy               [] Aquatic Therapy     Manual Treatments:      Modalities:      Communication with other providers:      Education provided to patient/caregiver:       Adverse reactions to treatment:      Equipment provided:      Assessment:      Time In / Time Out:   10:15/1045                Timed Code/Total Treatment Minutes:  15/30    Patients Report of Tolerance:    [] Patient limited by fatigue        [] Patient limited by pain   [] Patient limited by other medical complications   [] Other:     Prognosis:   [x] Good [] Fair  [] Poor    Plan:   [x] Continue per plan of care [] Alter current plan (see comments)  [] Plan of care initiated [] Hold pending MD visit [] Discharge    Plan for Next Session:    Progress home program.    Next Progress Note due:            Electronically signed by:  Yunior Woodard 10/12/2017, 10:56 AM

## 2019-01-03 ENCOUNTER — OFFICE VISIT (OUTPATIENT)
Dept: FAMILY MEDICINE CLINIC | Age: 74
End: 2019-01-03
Payer: MEDICARE

## 2019-01-03 VITALS
HEART RATE: 72 BPM | WEIGHT: 168 LBS | RESPIRATION RATE: 16 BRPM | BODY MASS INDEX: 27 KG/M2 | SYSTOLIC BLOOD PRESSURE: 132 MMHG | HEIGHT: 66 IN | DIASTOLIC BLOOD PRESSURE: 74 MMHG | OXYGEN SATURATION: 97 %

## 2019-01-03 DIAGNOSIS — R35.0 URINARY FREQUENCY: ICD-10-CM

## 2019-01-03 DIAGNOSIS — R39.9 LOWER URINARY TRACT SYMPTOMS (LUTS): Primary | ICD-10-CM

## 2019-01-03 DIAGNOSIS — Z12.11 SCREENING FOR COLON CANCER: ICD-10-CM

## 2019-01-03 PROBLEM — M25.562 ACUTE PAIN OF LEFT KNEE: Status: RESOLVED | Noted: 2017-01-23 | Resolved: 2019-01-03

## 2019-01-03 PROBLEM — I10 ESSENTIAL HYPERTENSION: Status: RESOLVED | Noted: 2018-01-17 | Resolved: 2019-01-03

## 2019-01-03 LAB
BILIRUBIN, POC: NEGATIVE
BLOOD URINE, POC: NEGATIVE
CLARITY, POC: CLEAR
COLOR, POC: YELLOW
GLUCOSE URINE, POC: NEGATIVE
KETONES, POC: NEGATIVE
LEUKOCYTE EST, POC: NEGATIVE
NITRITE, POC: NEGATIVE
PH, POC: 6.5
PROTEIN, POC: NEGATIVE
SPECIFIC GRAVITY, POC: 1.01
UROBILINOGEN, POC: 0.2

## 2019-01-03 PROCEDURE — G8427 DOCREV CUR MEDS BY ELIG CLIN: HCPCS | Performed by: FAMILY MEDICINE

## 2019-01-03 PROCEDURE — G8599 NO ASA/ANTIPLAT THER USE RNG: HCPCS | Performed by: FAMILY MEDICINE

## 2019-01-03 PROCEDURE — 99213 OFFICE O/P EST LOW 20 MIN: CPT | Performed by: FAMILY MEDICINE

## 2019-01-03 PROCEDURE — 1036F TOBACCO NON-USER: CPT | Performed by: FAMILY MEDICINE

## 2019-01-03 PROCEDURE — G8419 CALC BMI OUT NRM PARAM NOF/U: HCPCS | Performed by: FAMILY MEDICINE

## 2019-01-03 PROCEDURE — 1123F ACP DISCUSS/DSCN MKR DOCD: CPT | Performed by: FAMILY MEDICINE

## 2019-01-03 PROCEDURE — G8484 FLU IMMUNIZE NO ADMIN: HCPCS | Performed by: FAMILY MEDICINE

## 2019-01-03 PROCEDURE — 4040F PNEUMOC VAC/ADMIN/RCVD: CPT | Performed by: FAMILY MEDICINE

## 2019-01-03 PROCEDURE — 1101F PT FALLS ASSESS-DOCD LE1/YR: CPT | Performed by: FAMILY MEDICINE

## 2019-01-03 PROCEDURE — 3017F COLORECTAL CA SCREEN DOC REV: CPT | Performed by: FAMILY MEDICINE

## 2019-01-03 PROCEDURE — 81002 URINALYSIS NONAUTO W/O SCOPE: CPT | Performed by: FAMILY MEDICINE

## 2019-01-03 RX ORDER — LEVOFLOXACIN 250 MG/1
250 TABLET ORAL DAILY
Qty: 10 TABLET | Refills: 0 | Status: SHIPPED | OUTPATIENT
Start: 2019-01-03 | End: 2019-01-13

## 2019-01-03 RX ORDER — TAMSULOSIN HYDROCHLORIDE 0.4 MG/1
0.4 CAPSULE ORAL DAILY
Qty: 30 CAPSULE | Refills: 1 | Status: SHIPPED | OUTPATIENT
Start: 2019-01-03 | End: 2019-12-06 | Stop reason: ALTCHOICE

## 2019-01-03 ASSESSMENT — PATIENT HEALTH QUESTIONNAIRE - PHQ9
1. LITTLE INTEREST OR PLEASURE IN DOING THINGS: 0
SUM OF ALL RESPONSES TO PHQ QUESTIONS 1-9: 0
SUM OF ALL RESPONSES TO PHQ QUESTIONS 1-9: 0
SUM OF ALL RESPONSES TO PHQ9 QUESTIONS 1 & 2: 0
2. FEELING DOWN, DEPRESSED OR HOPELESS: 0

## 2019-01-12 ASSESSMENT — ENCOUNTER SYMPTOMS
NAUSEA: 0
SHORTNESS OF BREATH: 0
RHINORRHEA: 0
BLOOD IN STOOL: 0
SORE THROAT: 0
SINUS PRESSURE: 0
BACK PAIN: 0
WHEEZING: 0
DIARRHEA: 0
CONSTIPATION: 0
CHEST TIGHTNESS: 0
ABDOMINAL PAIN: 0
ALLERGIC/IMMUNOLOGIC NEGATIVE: 1
VOMITING: 0
COUGH: 0

## 2019-01-18 ENCOUNTER — OFFICE VISIT (OUTPATIENT)
Dept: FAMILY MEDICINE CLINIC | Age: 74
End: 2019-01-18
Payer: MEDICARE

## 2019-01-18 VITALS
HEART RATE: 87 BPM | BODY MASS INDEX: 26.92 KG/M2 | OXYGEN SATURATION: 97 % | TEMPERATURE: 97.8 F | HEIGHT: 66 IN | DIASTOLIC BLOOD PRESSURE: 70 MMHG | WEIGHT: 167.5 LBS | RESPIRATION RATE: 16 BRPM | SYSTOLIC BLOOD PRESSURE: 140 MMHG

## 2019-01-18 DIAGNOSIS — Z12.11 SCREENING FOR COLON CANCER: ICD-10-CM

## 2019-01-18 DIAGNOSIS — H10.33 ACUTE BACTERIAL CONJUNCTIVITIS OF BOTH EYES: ICD-10-CM

## 2019-01-18 DIAGNOSIS — Z23 NEED FOR PROPHYLACTIC VACCINATION AGAINST STREPTOCOCCUS PNEUMONIAE (PNEUMOCOCCUS): ICD-10-CM

## 2019-01-18 DIAGNOSIS — J40 BRONCHITIS: Primary | ICD-10-CM

## 2019-01-18 DIAGNOSIS — R05.9 COUGH: ICD-10-CM

## 2019-01-18 PROCEDURE — 1123F ACP DISCUSS/DSCN MKR DOCD: CPT | Performed by: FAMILY MEDICINE

## 2019-01-18 PROCEDURE — G8484 FLU IMMUNIZE NO ADMIN: HCPCS | Performed by: FAMILY MEDICINE

## 2019-01-18 PROCEDURE — G8419 CALC BMI OUT NRM PARAM NOF/U: HCPCS | Performed by: FAMILY MEDICINE

## 2019-01-18 PROCEDURE — 90670 PCV13 VACCINE IM: CPT | Performed by: FAMILY MEDICINE

## 2019-01-18 PROCEDURE — 3017F COLORECTAL CA SCREEN DOC REV: CPT | Performed by: FAMILY MEDICINE

## 2019-01-18 PROCEDURE — G8427 DOCREV CUR MEDS BY ELIG CLIN: HCPCS | Performed by: FAMILY MEDICINE

## 2019-01-18 PROCEDURE — 99213 OFFICE O/P EST LOW 20 MIN: CPT | Performed by: FAMILY MEDICINE

## 2019-01-18 PROCEDURE — 1036F TOBACCO NON-USER: CPT | Performed by: FAMILY MEDICINE

## 2019-01-18 PROCEDURE — 4040F PNEUMOC VAC/ADMIN/RCVD: CPT | Performed by: FAMILY MEDICINE

## 2019-01-18 PROCEDURE — G8599 NO ASA/ANTIPLAT THER USE RNG: HCPCS | Performed by: FAMILY MEDICINE

## 2019-01-18 PROCEDURE — G0009 ADMIN PNEUMOCOCCAL VACCINE: HCPCS | Performed by: FAMILY MEDICINE

## 2019-01-18 PROCEDURE — 1101F PT FALLS ASSESS-DOCD LE1/YR: CPT | Performed by: FAMILY MEDICINE

## 2019-01-18 RX ORDER — AZITHROMYCIN 250 MG/1
TABLET, FILM COATED ORAL
Qty: 1 PACKET | Refills: 0 | Status: SHIPPED | OUTPATIENT
Start: 2019-01-18 | End: 2019-01-22 | Stop reason: ALTCHOICE

## 2019-01-18 RX ORDER — ALBUTEROL SULFATE 90 UG/1
2 AEROSOL, METERED RESPIRATORY (INHALATION) 4 TIMES DAILY PRN
Qty: 1 INHALER | Refills: 5 | Status: SHIPPED | OUTPATIENT
Start: 2019-01-18 | End: 2019-12-06 | Stop reason: ALTCHOICE

## 2019-01-18 RX ORDER — GUAIFENESIN AND CODEINE PHOSPHATE 100; 10 MG/5ML; MG/5ML
5 SOLUTION ORAL EVERY 4 HOURS PRN
Qty: 118 ML | Refills: 0 | Status: SHIPPED | OUTPATIENT
Start: 2019-01-18 | End: 2019-01-25

## 2019-01-18 RX ORDER — PREDNISONE 20 MG/1
20 TABLET ORAL 2 TIMES DAILY
Qty: 10 TABLET | Refills: 0 | Status: SHIPPED | OUTPATIENT
Start: 2019-01-18 | End: 2019-01-22 | Stop reason: ALTCHOICE

## 2019-01-18 ASSESSMENT — PATIENT HEALTH QUESTIONNAIRE - PHQ9
1. LITTLE INTEREST OR PLEASURE IN DOING THINGS: 0
2. FEELING DOWN, DEPRESSED OR HOPELESS: 0
SUM OF ALL RESPONSES TO PHQ QUESTIONS 1-9: 0
SUM OF ALL RESPONSES TO PHQ9 QUESTIONS 1 & 2: 0
SUM OF ALL RESPONSES TO PHQ QUESTIONS 1-9: 0

## 2019-01-20 ASSESSMENT — ENCOUNTER SYMPTOMS
COUGH: 1
EYE DISCHARGE: 1
WHEEZING: 1

## 2019-01-22 ENCOUNTER — OFFICE VISIT (OUTPATIENT)
Dept: FAMILY MEDICINE CLINIC | Age: 74
End: 2019-01-22
Payer: MEDICARE

## 2019-01-22 ENCOUNTER — HOSPITAL ENCOUNTER (OUTPATIENT)
Dept: GENERAL RADIOLOGY | Age: 74
Discharge: HOME | End: 2019-01-22
Payer: MEDICARE

## 2019-01-22 ENCOUNTER — HOSPITAL ENCOUNTER (OUTPATIENT)
Age: 74
Discharge: HOME | End: 2019-01-22
Payer: MEDICARE

## 2019-01-22 VITALS
BODY MASS INDEX: 27.46 KG/M2 | RESPIRATION RATE: 16 BRPM | WEIGHT: 170.13 LBS | DIASTOLIC BLOOD PRESSURE: 80 MMHG | OXYGEN SATURATION: 97 % | SYSTOLIC BLOOD PRESSURE: 136 MMHG | HEART RATE: 81 BPM

## 2019-01-22 DIAGNOSIS — J40 BRONCHITIS: Primary | ICD-10-CM

## 2019-01-22 DIAGNOSIS — B96.89 ACUTE BACTERIAL SINUSITIS: ICD-10-CM

## 2019-01-22 DIAGNOSIS — J40 BRONCHITIS: ICD-10-CM

## 2019-01-22 DIAGNOSIS — J01.90 ACUTE BACTERIAL SINUSITIS: ICD-10-CM

## 2019-01-22 LAB
BASOPHILS ABSOLUTE: 0 K/UL (ref 0–0.2)
BASOPHILS RELATIVE PERCENT: 0.1 %
EOSINOPHILS ABSOLUTE: 0 K/UL (ref 0–0.6)
EOSINOPHILS RELATIVE PERCENT: 0.3 %
HCT VFR BLD CALC: 44.3 % (ref 40.5–52.5)
HEMOGLOBIN: 14.7 G/DL (ref 13.5–17.5)
LYMPHOCYTES ABSOLUTE: 1.5 K/UL (ref 1–5.1)
LYMPHOCYTES RELATIVE PERCENT: 16.5 %
MCH RBC QN AUTO: 29.2 PG (ref 26–34)
MCHC RBC AUTO-ENTMCNC: 33.1 G/DL (ref 31–36)
MCV RBC AUTO: 88.3 FL (ref 80–100)
MONOCYTES ABSOLUTE: 0.7 K/UL (ref 0–1.3)
MONOCYTES RELATIVE PERCENT: 8.2 %
NEUTROPHILS ABSOLUTE: 6.7 K/UL (ref 1.7–7.7)
NEUTROPHILS RELATIVE PERCENT: 74.9 %
PDW BLD-RTO: 17.8 % (ref 12.4–15.4)
PLATELET # BLD: 286 K/UL (ref 135–450)
PMV BLD AUTO: 11.2 FL (ref 5–10.5)
RBC # BLD: 5.02 M/UL (ref 4.2–5.9)
WBC # BLD: 8.9 K/UL (ref 4–11)

## 2019-01-22 PROCEDURE — G8599 NO ASA/ANTIPLAT THER USE RNG: HCPCS | Performed by: FAMILY MEDICINE

## 2019-01-22 PROCEDURE — 71046 X-RAY EXAM CHEST 2 VIEWS: CPT

## 2019-01-22 PROCEDURE — 1101F PT FALLS ASSESS-DOCD LE1/YR: CPT | Performed by: FAMILY MEDICINE

## 2019-01-22 PROCEDURE — G8484 FLU IMMUNIZE NO ADMIN: HCPCS | Performed by: FAMILY MEDICINE

## 2019-01-22 PROCEDURE — 99213 OFFICE O/P EST LOW 20 MIN: CPT | Performed by: FAMILY MEDICINE

## 2019-01-22 PROCEDURE — 96372 THER/PROPH/DIAG INJ SC/IM: CPT | Performed by: FAMILY MEDICINE

## 2019-01-22 PROCEDURE — 3017F COLORECTAL CA SCREEN DOC REV: CPT | Performed by: FAMILY MEDICINE

## 2019-01-22 PROCEDURE — G8419 CALC BMI OUT NRM PARAM NOF/U: HCPCS | Performed by: FAMILY MEDICINE

## 2019-01-22 PROCEDURE — 1123F ACP DISCUSS/DSCN MKR DOCD: CPT | Performed by: FAMILY MEDICINE

## 2019-01-22 PROCEDURE — G8427 DOCREV CUR MEDS BY ELIG CLIN: HCPCS | Performed by: FAMILY MEDICINE

## 2019-01-22 PROCEDURE — 1036F TOBACCO NON-USER: CPT | Performed by: FAMILY MEDICINE

## 2019-01-22 PROCEDURE — 4040F PNEUMOC VAC/ADMIN/RCVD: CPT | Performed by: FAMILY MEDICINE

## 2019-01-22 RX ORDER — LEVOFLOXACIN 500 MG/1
500 TABLET, FILM COATED ORAL DAILY
Qty: 7 TABLET | Refills: 0 | Status: SHIPPED | OUTPATIENT
Start: 2019-01-22 | End: 2019-01-29

## 2019-01-22 RX ORDER — BETAMETHASONE SODIUM PHOSPHATE AND BETAMETHASONE ACETATE 3; 3 MG/ML; MG/ML
12 INJECTION, SUSPENSION INTRA-ARTICULAR; INTRALESIONAL; INTRAMUSCULAR; SOFT TISSUE ONCE
Status: COMPLETED | OUTPATIENT
Start: 2019-01-22 | End: 2019-01-22

## 2019-01-22 RX ADMIN — BETAMETHASONE SODIUM PHOSPHATE AND BETAMETHASONE ACETATE 12 MG: 3; 3 INJECTION, SUSPENSION INTRA-ARTICULAR; INTRALESIONAL; INTRAMUSCULAR; SOFT TISSUE at 10:51

## 2019-01-22 ASSESSMENT — ENCOUNTER SYMPTOMS
EYE DISCHARGE: 1
COUGH: 1
WHEEZING: 1

## 2019-01-22 ASSESSMENT — PATIENT HEALTH QUESTIONNAIRE - PHQ9
1. LITTLE INTEREST OR PLEASURE IN DOING THINGS: 0
SUM OF ALL RESPONSES TO PHQ QUESTIONS 1-9: 0
2. FEELING DOWN, DEPRESSED OR HOPELESS: 0
SUM OF ALL RESPONSES TO PHQ9 QUESTIONS 1 & 2: 0
SUM OF ALL RESPONSES TO PHQ QUESTIONS 1-9: 0

## 2019-12-06 ENCOUNTER — OFFICE VISIT (OUTPATIENT)
Dept: FAMILY MEDICINE CLINIC | Age: 74
End: 2019-12-06
Payer: MEDICARE

## 2019-12-06 DIAGNOSIS — J20.9 ACUTE BRONCHITIS DUE TO INFECTION: ICD-10-CM

## 2019-12-06 PROCEDURE — G8427 DOCREV CUR MEDS BY ELIG CLIN: HCPCS | Performed by: PHYSICIAN ASSISTANT

## 2019-12-06 PROCEDURE — 99213 OFFICE O/P EST LOW 20 MIN: CPT | Performed by: PHYSICIAN ASSISTANT

## 2019-12-06 PROCEDURE — G8599 NO ASA/ANTIPLAT THER USE RNG: HCPCS | Performed by: PHYSICIAN ASSISTANT

## 2019-12-06 PROCEDURE — 4040F PNEUMOC VAC/ADMIN/RCVD: CPT | Performed by: PHYSICIAN ASSISTANT

## 2019-12-06 PROCEDURE — 1036F TOBACCO NON-USER: CPT | Performed by: PHYSICIAN ASSISTANT

## 2019-12-06 PROCEDURE — G8484 FLU IMMUNIZE NO ADMIN: HCPCS | Performed by: PHYSICIAN ASSISTANT

## 2019-12-06 PROCEDURE — G8417 CALC BMI ABV UP PARAM F/U: HCPCS | Performed by: PHYSICIAN ASSISTANT

## 2019-12-06 PROCEDURE — 1123F ACP DISCUSS/DSCN MKR DOCD: CPT | Performed by: PHYSICIAN ASSISTANT

## 2019-12-06 RX ORDER — PREDNISONE 10 MG/1
10 TABLET ORAL 2 TIMES DAILY
Qty: 10 TABLET | Refills: 0 | Status: SHIPPED | OUTPATIENT
Start: 2019-12-06 | End: 2019-12-11

## 2019-12-06 RX ORDER — BENZONATATE 100 MG/1
100 CAPSULE ORAL 3 TIMES DAILY PRN
Qty: 30 CAPSULE | Refills: 0 | Status: SHIPPED | OUTPATIENT
Start: 2019-12-06 | End: 2019-12-13

## 2019-12-06 RX ORDER — CEFDINIR 300 MG/1
300 CAPSULE ORAL 2 TIMES DAILY
Qty: 20 CAPSULE | Refills: 0 | Status: SHIPPED | OUTPATIENT
Start: 2019-12-06 | End: 2019-12-16

## 2019-12-06 ASSESSMENT — PATIENT HEALTH QUESTIONNAIRE - PHQ9
1. LITTLE INTEREST OR PLEASURE IN DOING THINGS: 0
SUM OF ALL RESPONSES TO PHQ QUESTIONS 1-9: 0
2. FEELING DOWN, DEPRESSED OR HOPELESS: 0
SUM OF ALL RESPONSES TO PHQ QUESTIONS 1-9: 0
SUM OF ALL RESPONSES TO PHQ9 QUESTIONS 1 & 2: 0

## 2019-12-08 VITALS
DIASTOLIC BLOOD PRESSURE: 80 MMHG | OXYGEN SATURATION: 98 % | SYSTOLIC BLOOD PRESSURE: 132 MMHG | HEART RATE: 71 BPM | RESPIRATION RATE: 18 BRPM | BODY MASS INDEX: 27.2 KG/M2 | WEIGHT: 168.5 LBS | TEMPERATURE: 98.1 F

## 2019-12-08 PROBLEM — J20.9 ACUTE BRONCHITIS DUE TO INFECTION: Status: ACTIVE | Noted: 2019-12-08

## 2019-12-08 ASSESSMENT — ENCOUNTER SYMPTOMS
SORE THROAT: 1
ABDOMINAL PAIN: 0
EYE REDNESS: 0
EYE DISCHARGE: 0
COUGH: 1
CHEST TIGHTNESS: 1
WHEEZING: 0

## 2020-02-26 ENCOUNTER — TELEPHONE (OUTPATIENT)
Dept: FAMILY MEDICINE CLINIC | Age: 75
End: 2020-02-26

## 2020-02-26 NOTE — TELEPHONE ENCOUNTER
Pt called, stating he has had hiccups for over 1 week. He is requesting Rx to stop them. Per PCP, there is a Rx, but this carries high SE, including sedation, inability to function adequately, and possibly long term motor dysfunction (ie tremor). Pt will not be able to drive while taking Rx. Spoke with pt and relayed PCP's message. Pt states he does not want to risk SE at this time, and already has tremors. He will notify office, if hiccups become unbearable. No further action is needed, at this time.

## 2020-02-28 ENCOUNTER — OFFICE VISIT (OUTPATIENT)
Dept: FAMILY MEDICINE CLINIC | Age: 75
End: 2020-02-28
Payer: MEDICARE

## 2020-02-28 VITALS
DIASTOLIC BLOOD PRESSURE: 80 MMHG | SYSTOLIC BLOOD PRESSURE: 138 MMHG | RESPIRATION RATE: 18 BRPM | TEMPERATURE: 97.5 F | HEART RATE: 96 BPM | OXYGEN SATURATION: 97 % | HEIGHT: 66 IN | BODY MASS INDEX: 26.52 KG/M2 | WEIGHT: 165 LBS

## 2020-02-28 DIAGNOSIS — N40.1 HYPERPLASIA OF PROSTATE WITH LOWER URINARY TRACT SYMPTOMS (LUTS): ICD-10-CM

## 2020-02-28 DIAGNOSIS — Z12.5 ENCOUNTER FOR SCREENING FOR MALIGNANT NEOPLASM OF PROSTATE: ICD-10-CM

## 2020-02-28 DIAGNOSIS — I25.10 ASCVD (ARTERIOSCLEROTIC CARDIOVASCULAR DISEASE): ICD-10-CM

## 2020-02-28 PROBLEM — J40 BRONCHITIS: Status: RESOLVED | Noted: 2018-01-17 | Resolved: 2020-02-28

## 2020-02-28 PROBLEM — J20.9 ACUTE BRONCHITIS DUE TO INFECTION: Status: RESOLVED | Noted: 2019-12-08 | Resolved: 2020-02-28

## 2020-02-28 LAB
A/G RATIO: 1.7 (ref 1.1–2.2)
ALBUMIN SERPL-MCNC: 4.3 G/DL (ref 3.4–5)
ALP BLD-CCNC: 83 U/L (ref 40–129)
ALT SERPL-CCNC: 15 U/L (ref 10–40)
ANION GAP SERPL CALCULATED.3IONS-SCNC: 14 MMOL/L (ref 3–16)
AST SERPL-CCNC: 17 U/L (ref 15–37)
BILIRUB SERPL-MCNC: 0.9 MG/DL (ref 0–1)
BUN BLDV-MCNC: 18 MG/DL (ref 7–20)
CALCIUM SERPL-MCNC: 10.7 MG/DL (ref 8.3–10.6)
CHLORIDE BLD-SCNC: 99 MMOL/L (ref 99–110)
CO2: 28 MMOL/L (ref 21–32)
CREAT SERPL-MCNC: 0.8 MG/DL (ref 0.8–1.3)
GFR AFRICAN AMERICAN: >60
GFR NON-AFRICAN AMERICAN: >60
GLOBULIN: 2.5 G/DL
GLUCOSE BLD-MCNC: 80 MG/DL (ref 70–99)
POTASSIUM SERPL-SCNC: 4.8 MMOL/L (ref 3.5–5.1)
PROSTATE SPECIFIC ANTIGEN: 1.92 NG/ML (ref 0–4)
SODIUM BLD-SCNC: 141 MMOL/L (ref 136–145)
TOTAL PROTEIN: 6.8 G/DL (ref 6.4–8.2)

## 2020-02-28 PROCEDURE — G8427 DOCREV CUR MEDS BY ELIG CLIN: HCPCS | Performed by: FAMILY MEDICINE

## 2020-02-28 PROCEDURE — 1036F TOBACCO NON-USER: CPT | Performed by: FAMILY MEDICINE

## 2020-02-28 PROCEDURE — G8417 CALC BMI ABV UP PARAM F/U: HCPCS | Performed by: FAMILY MEDICINE

## 2020-02-28 PROCEDURE — 1123F ACP DISCUSS/DSCN MKR DOCD: CPT | Performed by: FAMILY MEDICINE

## 2020-02-28 PROCEDURE — 99214 OFFICE O/P EST MOD 30 MIN: CPT | Performed by: FAMILY MEDICINE

## 2020-02-28 PROCEDURE — G8484 FLU IMMUNIZE NO ADMIN: HCPCS | Performed by: FAMILY MEDICINE

## 2020-02-28 PROCEDURE — 4040F PNEUMOC VAC/ADMIN/RCVD: CPT | Performed by: FAMILY MEDICINE

## 2020-02-28 RX ORDER — CHLORPROMAZINE HYDROCHLORIDE 25 MG/1
25 TABLET, FILM COATED ORAL 3 TIMES DAILY PRN
Qty: 30 TABLET | Refills: 0 | Status: SHIPPED | OUTPATIENT
Start: 2020-02-28 | End: 2020-04-28 | Stop reason: ALTCHOICE

## 2020-02-28 RX ORDER — OMEPRAZOLE 40 MG/1
40 CAPSULE, DELAYED RELEASE ORAL
Qty: 30 CAPSULE | Refills: 0 | Status: SHIPPED | OUTPATIENT
Start: 2020-02-28 | End: 2020-04-28 | Stop reason: ALTCHOICE

## 2020-02-28 RX ORDER — TAMSULOSIN HYDROCHLORIDE 0.4 MG/1
0.4 CAPSULE ORAL DAILY
Qty: 30 CAPSULE | Refills: 5 | Status: SHIPPED | OUTPATIENT
Start: 2020-02-28 | End: 2020-08-26 | Stop reason: ALTCHOICE

## 2020-02-28 ASSESSMENT — PATIENT HEALTH QUESTIONNAIRE - PHQ9
2. FEELING DOWN, DEPRESSED OR HOPELESS: 0
SUM OF ALL RESPONSES TO PHQ QUESTIONS 1-9: 0
SUM OF ALL RESPONSES TO PHQ9 QUESTIONS 1 & 2: 0
1. LITTLE INTEREST OR PLEASURE IN DOING THINGS: 0
SUM OF ALL RESPONSES TO PHQ QUESTIONS 1-9: 0

## 2020-02-28 NOTE — PROGRESS NOTES
2020     Rivka Blood [facility] (:  1945) is a 66 y.o. male, here for evaluation of the following medical concerns:    Patient presents with:  Heartburn: Pt is here for hiccups, heartburn and SOB for the past 2 weeks// getting worse  Shortness of Breath    Hiccups for 2 weeks    Burning with pain in lower esophagus, occurs with hiccups. No change with antacids    Urination, poor flow with leakage. Worsened urgency        Review of Systems   Respiratory:        Persistent hiccups and a triplet pattern with coinciding reflux burning pain epigastrium to mid chest   Gastrointestinal:        Heartburn   Genitourinary: Positive for difficulty urinating, frequency and urgency. Patient has urge incontinence and dribbling   All other systems reviewed and are negative. Prior to Visit Medications    Medication Sig Taking? Authorizing Provider   Multiple Vitamins-Minerals (ICAPS AREDS 2 PO) Take by mouth every morning Over The Counter Yes Historical Provider, MD        Social History     Tobacco Use    Smoking status: Former Smoker     Packs/day: 1.00     Years: 20.00     Pack years: 20.00     Types: Cigarettes, Pipe     Start date:      Last attempt to quit:      Years since quittin.1    Smokeless tobacco: Never Used   Substance Use Topics    Alcohol use: No        Vitals:    20 1015   BP: 138/80   Site: Right Upper Arm   Position: Sitting   Cuff Size: Medium Adult   Pulse: 96   Resp: 18   Temp: 97.5 °F (36.4 °C)   SpO2: 97%   Weight: 165 lb (74.8 kg)   Height: 5' 6\" (1.676 m)     Estimated body mass index is 26.63 kg/m² as calculated from the following:    Height as of this encounter: 5' 6\" (1.676 m). Weight as of this encounter: 165 lb (74.8 kg). Physical Exam  Constitutional:       Appearance: He is well-developed. HENT:      Head: Normocephalic and atraumatic.       Right Ear: Tympanic membrane and external ear normal.      Left Ear: Tympanic membrane and external

## 2020-04-14 ENCOUNTER — OFFICE VISIT (OUTPATIENT)
Dept: FAMILY MEDICINE CLINIC | Age: 75
End: 2020-04-14
Payer: MEDICARE

## 2020-04-14 VITALS
WEIGHT: 171.2 LBS | DIASTOLIC BLOOD PRESSURE: 68 MMHG | SYSTOLIC BLOOD PRESSURE: 136 MMHG | HEART RATE: 96 BPM | TEMPERATURE: 97.9 F | OXYGEN SATURATION: 98 % | BODY MASS INDEX: 27.63 KG/M2 | RESPIRATION RATE: 20 BRPM

## 2020-04-14 LAB
A/G RATIO: 2.2 (ref 1.1–2.2)
ALBUMIN SERPL-MCNC: 4.3 G/DL (ref 3.4–5)
ALP BLD-CCNC: 94 U/L (ref 40–129)
ALT SERPL-CCNC: 21 U/L (ref 10–40)
ANION GAP SERPL CALCULATED.3IONS-SCNC: 13 MMOL/L (ref 3–16)
AST SERPL-CCNC: 22 U/L (ref 15–37)
BASOPHILS ABSOLUTE: 0.1 K/UL (ref 0–0.2)
BASOPHILS RELATIVE PERCENT: 0.9 %
BILIRUB SERPL-MCNC: 0.6 MG/DL (ref 0–1)
BILIRUBIN URINE: NEGATIVE
BLOOD, URINE: NEGATIVE
BUN BLDV-MCNC: 10 MG/DL (ref 7–20)
CALCIUM SERPL-MCNC: 10.2 MG/DL (ref 8.3–10.6)
CHLORIDE BLD-SCNC: 101 MMOL/L (ref 99–110)
CLARITY: CLEAR
CO2: 27 MMOL/L (ref 21–32)
COLOR: YELLOW
CREAT SERPL-MCNC: 0.9 MG/DL (ref 0.8–1.3)
EOSINOPHILS ABSOLUTE: 0.1 K/UL (ref 0–0.6)
EOSINOPHILS RELATIVE PERCENT: 1.6 %
EPITHELIAL CELLS, UA: 0 /HPF (ref 0–5)
GFR AFRICAN AMERICAN: >60
GFR NON-AFRICAN AMERICAN: >60
GLOBULIN: 2 G/DL
GLUCOSE BLD-MCNC: 102 MG/DL (ref 70–99)
GLUCOSE URINE: NEGATIVE MG/DL
HCT VFR BLD CALC: 48.1 % (ref 40.5–52.5)
HEMOGLOBIN: 16.2 G/DL (ref 13.5–17.5)
HYALINE CASTS: 0 /LPF (ref 0–8)
KETONES, URINE: NEGATIVE MG/DL
LEUKOCYTE ESTERASE, URINE: NEGATIVE
LYMPHOCYTES ABSOLUTE: 1.6 K/UL (ref 1–5.1)
LYMPHOCYTES RELATIVE PERCENT: 17.8 %
MCH RBC QN AUTO: 32.2 PG (ref 26–34)
MCHC RBC AUTO-ENTMCNC: 33.7 G/DL (ref 31–36)
MCV RBC AUTO: 95.4 FL (ref 80–100)
MICROSCOPIC EXAMINATION: NORMAL
MONOCYTES ABSOLUTE: 1.1 K/UL (ref 0–1.3)
MONOCYTES RELATIVE PERCENT: 11.7 %
NEUTROPHILS ABSOLUTE: 6.1 K/UL (ref 1.7–7.7)
NEUTROPHILS RELATIVE PERCENT: 68 %
NITRITE, URINE: NEGATIVE
PDW BLD-RTO: 14.9 % (ref 12.4–15.4)
PH UA: 5.5 (ref 5–8)
PLATELET # BLD: 254 K/UL (ref 135–450)
PMV BLD AUTO: 9.3 FL (ref 5–10.5)
POTASSIUM SERPL-SCNC: 4.7 MMOL/L (ref 3.5–5.1)
PROTEIN UA: NEGATIVE MG/DL
RBC # BLD: 5.04 M/UL (ref 4.2–5.9)
RBC UA: 0 /HPF (ref 0–4)
SODIUM BLD-SCNC: 141 MMOL/L (ref 136–145)
SPECIFIC GRAVITY UA: 1.02 (ref 1–1.03)
TOTAL PROTEIN: 6.3 G/DL (ref 6.4–8.2)
URINE TYPE: NORMAL
UROBILINOGEN, URINE: 0.2 E.U./DL
WBC # BLD: 9 K/UL (ref 4–11)
WBC UA: 1 /HPF (ref 0–5)

## 2020-04-14 PROCEDURE — 4040F PNEUMOC VAC/ADMIN/RCVD: CPT | Performed by: NURSE PRACTITIONER

## 2020-04-14 PROCEDURE — G8427 DOCREV CUR MEDS BY ELIG CLIN: HCPCS | Performed by: NURSE PRACTITIONER

## 2020-04-14 PROCEDURE — G8417 CALC BMI ABV UP PARAM F/U: HCPCS | Performed by: NURSE PRACTITIONER

## 2020-04-14 PROCEDURE — 1123F ACP DISCUSS/DSCN MKR DOCD: CPT | Performed by: NURSE PRACTITIONER

## 2020-04-14 PROCEDURE — 99214 OFFICE O/P EST MOD 30 MIN: CPT | Performed by: NURSE PRACTITIONER

## 2020-04-14 PROCEDURE — 1036F TOBACCO NON-USER: CPT | Performed by: NURSE PRACTITIONER

## 2020-04-14 ASSESSMENT — ENCOUNTER SYMPTOMS
CHEST TIGHTNESS: 0
BACK PAIN: 1
SHORTNESS OF BREATH: 0
SORE THROAT: 0
BLOOD IN STOOL: 0
TROUBLE SWALLOWING: 0
NAUSEA: 0
CONSTIPATION: 0
ABDOMINAL PAIN: 0
EYE PAIN: 0
RHINORRHEA: 0
SINUS PAIN: 0
WHEEZING: 0
SINUS PRESSURE: 0
COUGH: 0
VOMITING: 0
PHOTOPHOBIA: 0
DIARRHEA: 0

## 2020-04-14 NOTE — PROGRESS NOTES
Packs/day: 1.00     Years: 20.00     Pack years: 20.00     Types: Cigarettes, Pipe     Start date: 34     Last attempt to quit: 1977     Years since quittin.3    Smokeless tobacco: Never Used   Substance Use Topics    Alcohol use: No    Drug use: No        Lab Results   Component Value Date    WBC 8.9 2019    HGB 14.7 2019    HCT 44.3 2019    MCV 88.3 2019     2019     Lab Results   Component Value Date    LABA1C 5.4 02/15/2011     No results found for: TSHFT4, TSH  Lab Results   Component Value Date    CHOL 185 2016    TRIG 81 2016    HDL 50 2016       Wt Readings from Last 3 Encounters:   20 171 lb 3.2 oz (77.7 kg)   20 165 lb (74.8 kg)   19 168 lb 8 oz (76.4 kg)       Wt Readings from Last 3 Encounters:   20 171 lb 3.2 oz (77.7 kg)   20 165 lb (74.8 kg)   19 168 lb 8 oz (76.4 kg)     Temp Readings from Last 3 Encounters:   20 97.9 °F (36.6 °C) (Temporal)   20 97.5 °F (36.4 °C)   19 98.1 °F (36.7 °C) (Oral)     BP Readings from Last 3 Encounters:   20 136/68   20 138/80   19 132/80     Pulse Readings from Last 3 Encounters:   20 96   20 96   19 71        No results found for this visit on 20. Physical Exam  Vitals signs and nursing note reviewed. Constitutional:       General: He is not in acute distress. Appearance: Normal appearance. He is well-developed. He is not ill-appearing, toxic-appearing or diaphoretic. HENT:      Head: Normocephalic and atraumatic. Right Ear: Tympanic membrane, ear canal and external ear normal.      Left Ear: Tympanic membrane, ear canal and external ear normal.      Nose: Nose normal. No congestion or rhinorrhea. Mouth/Throat:      Mouth: Mucous membranes are moist.      Pharynx: Oropharynx is clear. No oropharyngeal exudate or posterior oropharyngeal erythema.    Eyes:      Extraocular Movements: Extraocular movements intact. Conjunctiva/sclera: Conjunctivae normal.      Pupils: Pupils are equal, round, and reactive to light. Neck:      Musculoskeletal: Full passive range of motion without pain, normal range of motion and neck supple. No neck rigidity or muscular tenderness. Thyroid: No thyroid mass or thyromegaly. Trachea: Trachea normal.   Cardiovascular:      Rate and Rhythm: Normal rate and regular rhythm. Pulses: Normal pulses. Heart sounds: Normal heart sounds, S1 normal and S2 normal.   Pulmonary:      Effort: Pulmonary effort is normal. No accessory muscle usage or respiratory distress. Breath sounds: Normal breath sounds. No decreased breath sounds, wheezing, rhonchi or rales. Chest:      Chest wall: No tenderness. Abdominal:      General: Abdomen is flat. Bowel sounds are normal. There is no distension. Palpations: Abdomen is soft. There is no mass. Tenderness: There is no abdominal tenderness. There is no guarding or rebound. Musculoskeletal: Normal range of motion. General: No swelling or tenderness. Right lower leg: No edema. Left lower leg: No edema. Lymphadenopathy:      Cervical: No cervical adenopathy. Skin:     General: Skin is warm and dry. Capillary Refill: Capillary refill takes less than 2 seconds. Coloration: Skin is not jaundiced or pale. Findings: No bruising, erythema, lesion or rash. Nails: There is no clubbing. Neurological:      General: No focal deficit present. Mental Status: He is alert and oriented to person, place, and time. Cranial Nerves: Cranial nerves are intact. Sensory: Sensation is intact. Motor: Motor function is intact. Coordination: Coordination abnormal.      Comments: Slight shuffling gait with turning in hallway.      Psychiatric:         Mood and Affect: Mood normal.         Speech: Speech normal.         Behavior: Behavior normal.

## 2020-04-15 LAB
ESTIMATED AVERAGE GLUCOSE: 105.4 MG/DL
HBA1C MFR BLD: 5.3 %

## 2020-04-17 ENCOUNTER — HOSPITAL ENCOUNTER (OUTPATIENT)
Dept: MRI IMAGING | Age: 75
Discharge: HOME | End: 2020-04-17
Payer: MEDICARE

## 2020-04-17 ENCOUNTER — TELEPHONE (OUTPATIENT)
Dept: INTERNAL MEDICINE CLINIC | Age: 75
End: 2020-04-17

## 2020-04-17 PROCEDURE — 70553 MRI BRAIN STEM W/O & W/DYE: CPT

## 2020-04-17 PROCEDURE — 6360000004 HC RX CONTRAST MEDICATION: Performed by: NURSE PRACTITIONER

## 2020-04-17 PROCEDURE — A9579 GAD-BASE MR CONTRAST NOS,1ML: HCPCS | Performed by: NURSE PRACTITIONER

## 2020-04-17 RX ADMIN — GADOTERIDOL 16 ML: 279.3 INJECTION, SOLUTION INTRAVENOUS at 15:44

## 2020-04-28 ENCOUNTER — VIRTUAL VISIT (OUTPATIENT)
Dept: FAMILY MEDICINE CLINIC | Age: 75
End: 2020-04-28
Payer: MEDICARE

## 2020-04-28 PROCEDURE — G8417 CALC BMI ABV UP PARAM F/U: HCPCS | Performed by: NURSE PRACTITIONER

## 2020-04-28 PROCEDURE — 4040F PNEUMOC VAC/ADMIN/RCVD: CPT | Performed by: NURSE PRACTITIONER

## 2020-04-28 PROCEDURE — G8427 DOCREV CUR MEDS BY ELIG CLIN: HCPCS | Performed by: NURSE PRACTITIONER

## 2020-04-28 PROCEDURE — 1123F ACP DISCUSS/DSCN MKR DOCD: CPT | Performed by: NURSE PRACTITIONER

## 2020-04-28 PROCEDURE — G2025 DIS SITE TELE SVCS RHC/FQHC: HCPCS | Performed by: NURSE PRACTITIONER

## 2020-04-28 PROCEDURE — 1036F TOBACCO NON-USER: CPT | Performed by: NURSE PRACTITIONER

## 2020-04-28 NOTE — PROGRESS NOTES
appetite change, chills, diaphoresis, fatigue, fever and unexpected weight change. HENT: Negative for congestion, dental problem, ear pain, hearing loss, mouth sores, nosebleeds, postnasal drip, rhinorrhea, sinus pressure, sinus pain, sore throat, tinnitus and trouble swallowing. Eyes: Negative for photophobia, pain and visual disturbance. Respiratory: Negative for cough, chest tightness, shortness of breath and wheezing. Cardiovascular: Negative for chest pain, palpitations and leg swelling. Gastrointestinal: Negative for abdominal pain, blood in stool, constipation, diarrhea, nausea and vomiting. Endocrine: Negative for cold intolerance, heat intolerance, polydipsia and polyuria. Genitourinary: Negative for difficulty urinating, dysuria, flank pain, frequency, hematuria, penile pain, penile swelling, scrotal swelling, testicular pain and urgency. Nocturia and ED   Musculoskeletal: Positive for arthralgias. Negative for back pain, gait problem, joint swelling, myalgias, neck pain and neck stiffness. Skin: Negative for pallor, rash and wound. Allergic/Immunologic: Negative for environmental allergies, food allergies and immunocompromised state. Neurological: Positive for tremors. Negative for dizziness, syncope, weakness, light-headedness, numbness and headaches. Hematological: Negative for adenopathy. Does not bruise/bleed easily. Psychiatric/Behavioral: Negative for confusion, decreased concentration, self-injury, sleep disturbance and suicidal ideas. The patient is not nervous/anxious. Prior to Visit Medications    Medication Sig Taking?  Authorizing Provider   tamsulosin (FLOMAX) 0.4 MG capsule Take 1 capsule by mouth daily Yes Helder Baumann MD   Multiple Vitamins-Minerals (ICAPS AREDS 2 PO) Take by mouth every morning Over The Counter Yes Historical Provider, MD       Allergies   Allergen Reactions    Other      \"Allergic To Hay, Grass, Perfume, Candle Scents, Crop

## 2020-04-29 ASSESSMENT — ENCOUNTER SYMPTOMS
PHOTOPHOBIA: 0
SINUS PAIN: 0
BLOOD IN STOOL: 0
VOMITING: 0
DIARRHEA: 0
RHINORRHEA: 0
CONSTIPATION: 0
ABDOMINAL PAIN: 0
CHEST TIGHTNESS: 0
SINUS PRESSURE: 0
COUGH: 0
SHORTNESS OF BREATH: 0
NAUSEA: 0
BACK PAIN: 0
EYE PAIN: 0
SORE THROAT: 0
WHEEZING: 0
TROUBLE SWALLOWING: 0

## 2020-05-06 ENCOUNTER — TELEPHONE (OUTPATIENT)
Dept: FAMILY MEDICINE CLINIC | Age: 75
End: 2020-05-06

## 2020-05-09 RX ORDER — OMEPRAZOLE 20 MG/1
20 CAPSULE, DELAYED RELEASE ORAL DAILY
Qty: 30 CAPSULE | Refills: 0 | Status: SHIPPED | OUTPATIENT
Start: 2020-05-09 | End: 2020-06-17 | Stop reason: ALTCHOICE

## 2020-05-27 ENCOUNTER — OFFICE VISIT (OUTPATIENT)
Dept: ORTHOPEDIC SURGERY | Age: 75
End: 2020-05-27
Payer: MEDICARE

## 2020-05-27 VITALS — OXYGEN SATURATION: 98 % | HEIGHT: 66 IN | WEIGHT: 171 LBS | RESPIRATION RATE: 16 BRPM | BODY MASS INDEX: 27.48 KG/M2

## 2020-05-27 PROCEDURE — 1123F ACP DISCUSS/DSCN MKR DOCD: CPT | Performed by: ORTHOPAEDIC SURGERY

## 2020-05-27 PROCEDURE — 1036F TOBACCO NON-USER: CPT | Performed by: ORTHOPAEDIC SURGERY

## 2020-05-27 PROCEDURE — G8427 DOCREV CUR MEDS BY ELIG CLIN: HCPCS | Performed by: ORTHOPAEDIC SURGERY

## 2020-05-27 PROCEDURE — G8417 CALC BMI ABV UP PARAM F/U: HCPCS | Performed by: ORTHOPAEDIC SURGERY

## 2020-05-27 PROCEDURE — 4040F PNEUMOC VAC/ADMIN/RCVD: CPT | Performed by: ORTHOPAEDIC SURGERY

## 2020-05-27 PROCEDURE — 99203 OFFICE O/P NEW LOW 30 MIN: CPT | Performed by: ORTHOPAEDIC SURGERY

## 2020-05-27 RX ORDER — CHLORPROMAZINE HYDROCHLORIDE 25 MG/1
TABLET, FILM COATED ORAL
COMMUNITY
Start: 2020-02-28 | End: 2020-06-17

## 2020-05-27 RX ORDER — PRIMIDONE 50 MG/1
TABLET ORAL
COMMUNITY
Start: 2020-05-15 | End: 2020-06-17 | Stop reason: ALTCHOICE

## 2020-05-27 NOTE — PROGRESS NOTES
Patient is here today for right knee pain. Patient states he did have a MRI done done at [facility]. Patient states pain today is a 5/10. Patient states pain is medial and lateral. Patient states he would like another opinion of his MRI. Patient states pain will come and go and travel down into his right shine from time to time. Patient states that he does not want a steroid injection.

## 2020-05-28 ASSESSMENT — ENCOUNTER SYMPTOMS
COLOR CHANGE: 0
CHEST TIGHTNESS: 0
BACK PAIN: 0

## 2020-05-28 NOTE — PROGRESS NOTES
Subjective:      Patient ID: Alyssa Prakash. is a 66 y.o. male. Patient is here today for right knee pain. Patient states he did have a MRI done done at [facility]. Patient states pain today is a 5/10. Patient states pain is medial and lateral. Patient states he would like another opinion of his MRI. Patient states pain will come and go and travel down into his right shine from time to time. Patient states that he does not want a steroid injection. He comes in today for his first visit with me in regards to his right knee pain. He states that since he is sustained a twisting injury to his right knee several months ago he has continued to have a progressively worsening sharp, stabbing pain primarily along the medial aspect of his right knee. He also states that the pain will occasionally radiate to the outside and front of his right knee. He did have a cortisone injection with no relief of his symptoms. He states that he did have a left knee arthroscopy for similar symptoms with complete resolution of his symptoms. Patient denies any new injury to the involved extremity/ joint, denies numbness or tingling in the involved extremity and denies fever or chills. He would like to discuss proceeding with surgical treatment for his right knee today. Review of Systems   Constitutional: Negative for activity change, chills and fever. Respiratory: Negative for chest tightness. Cardiovascular: Negative for chest pain. Musculoskeletal: Positive for arthralgias, gait problem, joint swelling and myalgias. Negative for back pain. Skin: Negative for color change, pallor, rash and wound. Neurological: Negative for weakness and numbness.        Past Medical History:   Diagnosis Date    Acid reflux     Arthritis     \"Hands, Hips, Back, Tailbone, Ankles, Most Of My Joints\"    Asthma     No Pulmonologist At This Time    Chronic back pain     Hiatal hernia     History of blood transfusion weight-bearing as tolerated. Continue range of motion exercises as instructed. Ice and elevate as needed. Tylenol or Motrin for pain. Follow up in 2 weeks postop.         Kojo Riley, DO

## 2020-06-01 ENCOUNTER — TELEPHONE (OUTPATIENT)
Dept: ORTHOPEDIC SURGERY | Age: 75
End: 2020-06-01

## 2020-06-03 ENCOUNTER — TELEPHONE (OUTPATIENT)
Dept: ORTHOPEDIC SURGERY | Age: 75
End: 2020-06-03

## 2020-06-12 ENCOUNTER — ANESTHESIA EVENT (OUTPATIENT)
Dept: OPERATING ROOM | Age: 75
End: 2020-06-12
Payer: MEDICARE
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
## 2020-06-16 NOTE — PROGRESS NOTES
6/16/20 - . LM of pre-testing on 6/17/20 @ 1000, arrival 0930. Bring insurance card, picture ID, list of medications and wear a mask.

## 2020-06-17 ENCOUNTER — HOSPITAL ENCOUNTER (OUTPATIENT)
Dept: PREADMISSION TESTING | Age: 75
Discharge: HOME | End: 2020-06-21
Payer: MEDICARE

## 2020-06-17 VITALS
OXYGEN SATURATION: 98 % | RESPIRATION RATE: 16 BRPM | WEIGHT: 171 LBS | SYSTOLIC BLOOD PRESSURE: 150 MMHG | BODY MASS INDEX: 27.48 KG/M2 | DIASTOLIC BLOOD PRESSURE: 75 MMHG | HEART RATE: 78 BPM | TEMPERATURE: 97.7 F | HEIGHT: 66 IN

## 2020-06-17 LAB
EKG ATRIAL RATE: 68 BPM
EKG DIAGNOSIS: NORMAL
EKG P AXIS: 20 DEGREES
EKG P-R INTERVAL: 194 MS
EKG Q-T INTERVAL: 382 MS
EKG QRS DURATION: 102 MS
EKG QTC CALCULATION (BAZETT): 406 MS
EKG R AXIS: -33 DEGREES
EKG T AXIS: 34 DEGREES
EKG VENTRICULAR RATE: 68 BPM

## 2020-06-17 PROCEDURE — 93010 ELECTROCARDIOGRAM REPORT: CPT | Performed by: INTERNAL MEDICINE

## 2020-06-17 PROCEDURE — 93005 ELECTROCARDIOGRAM TRACING: CPT | Performed by: NURSE PRACTITIONER

## 2020-06-17 ASSESSMENT — PAIN DESCRIPTION - ONSET: ONSET: ON-GOING

## 2020-06-17 ASSESSMENT — PAIN DESCRIPTION - PAIN TYPE: TYPE: ACUTE PAIN

## 2020-06-17 ASSESSMENT — PAIN DESCRIPTION - ORIENTATION: ORIENTATION: RIGHT

## 2020-06-17 ASSESSMENT — PAIN DESCRIPTION - PROGRESSION: CLINICAL_PROGRESSION: GRADUALLY WORSENING

## 2020-06-17 ASSESSMENT — PAIN DESCRIPTION - FREQUENCY: FREQUENCY: CONTINUOUS

## 2020-06-17 ASSESSMENT — PAIN - FUNCTIONAL ASSESSMENT: PAIN_FUNCTIONAL_ASSESSMENT: PREVENTS OR INTERFERES SOME ACTIVE ACTIVITIES AND ADLS

## 2020-06-17 ASSESSMENT — PAIN DESCRIPTION - LOCATION: LOCATION: KNEE

## 2020-06-17 ASSESSMENT — PAIN DESCRIPTION - DESCRIPTORS: DESCRIPTORS: SHARP

## 2020-06-17 ASSESSMENT — PAIN SCALES - GENERAL: PAINLEVEL_OUTOF10: 8

## 2020-06-22 ENCOUNTER — TELEPHONE (OUTPATIENT)
Dept: ORTHOPEDIC SURGERY | Age: 75
End: 2020-06-22

## 2020-06-22 NOTE — TELEPHONE ENCOUNTER
Patient is hurting a lot and wants to know if he can take for the pain or can you give him something for the pain and the swelling.  Ph. [phone]

## 2020-06-23 ENCOUNTER — TELEPHONE (OUTPATIENT)
Dept: ORTHOPEDIC SURGERY | Age: 75
End: 2020-06-23

## 2020-06-23 NOTE — TELEPHONE ENCOUNTER
Called patient's insurance spoke with Silvia FLANAGAN No prior Sundra Faes is required for cpt  Code: 97243.    Ref# [id number]

## 2020-06-25 RX ORDER — CEPHALEXIN 250 MG/1
250 CAPSULE ORAL 4 TIMES DAILY
Qty: 4 CAPSULE | Refills: 0 | Status: SHIPPED | OUTPATIENT
Start: 2020-06-25 | End: 2020-06-26

## 2020-06-25 RX ORDER — HYDROCODONE BITARTRATE AND ACETAMINOPHEN 5; 325 MG/1; MG/1
1 TABLET ORAL EVERY 6 HOURS PRN
Qty: 10 TABLET | Refills: 0 | Status: SHIPPED | OUTPATIENT
Start: 2020-06-25 | End: 2020-06-28

## 2020-06-29 ENCOUNTER — HOSPITAL ENCOUNTER (OUTPATIENT)
Age: 75
Discharge: HOME | End: 2020-06-29
Payer: MEDICARE

## 2020-06-29 PROCEDURE — U0002 COVID-19 LAB TEST NON-CDC: HCPCS

## 2020-07-01 LAB
SARS-COV-2: NOT DETECTED
SOURCE: NORMAL

## 2020-07-01 NOTE — PROGRESS NOTES
left message on voicemail. Pt. will arrive at the ER entrance at 10:45am on 7/2/2020. Pt.informed that they may have one visitor come with them. The visitor must be free of covid symptoms. Both of them must wear a mask upon entering the hospital.  If they do not have a mask, one will be given to them at the . The masks must be worn the whole time they are in the building. The visitor must stay in the assigned room in [facility]. The visitor may not go to the vending machines, cafeteria, or walk around in the hospital. Covid-19 test was completed on 6/29/2020  and results are pending. Pt. Should have been self-quarantining since their Covid-19 testing. If Pt. Has had a cough, sore throat, fever, or any other unusual s/s that the physician should be made aware of before surgery, they should call the office right away. Pt. had PAT testing done in [place] on 6/17/2020. For any further questions/concerns, please call me at [phone].

## 2020-07-02 ENCOUNTER — HOSPITAL ENCOUNTER (OUTPATIENT)
Age: 75
Setting detail: OUTPATIENT SURGERY
Discharge: HOME | End: 2020-07-02
Attending: ORTHOPAEDIC SURGERY | Admitting: ORTHOPAEDIC SURGERY
Payer: MEDICARE

## 2020-07-02 ENCOUNTER — ANESTHESIA (OUTPATIENT)
Dept: OPERATING ROOM | Age: 75
End: 2020-07-02
Payer: MEDICARE

## 2020-07-02 VITALS
OXYGEN SATURATION: 94 % | WEIGHT: 157 LBS | RESPIRATION RATE: 16 BRPM | SYSTOLIC BLOOD PRESSURE: 151 MMHG | HEART RATE: 68 BPM | DIASTOLIC BLOOD PRESSURE: 82 MMHG | TEMPERATURE: 97.4 F | BODY MASS INDEX: 25.23 KG/M2 | HEIGHT: 66 IN

## 2020-07-02 VITALS
DIASTOLIC BLOOD PRESSURE: 67 MMHG | RESPIRATION RATE: 13 BRPM | TEMPERATURE: 96.8 F | SYSTOLIC BLOOD PRESSURE: 111 MMHG | OXYGEN SATURATION: 99 %

## 2020-07-02 PROCEDURE — 3600000014 HC SURGERY LEVEL 4 ADDTL 15MIN: Performed by: ORTHOPAEDIC SURGERY

## 2020-07-02 PROCEDURE — 7100000000 HC PACU RECOVERY - FIRST 15 MIN: Performed by: ORTHOPAEDIC SURGERY

## 2020-07-02 PROCEDURE — 29880 ARTHRS KNE SRG MNISECTMY M&L: CPT | Performed by: ORTHOPAEDIC SURGERY

## 2020-07-02 PROCEDURE — 3700000000 HC ANESTHESIA ATTENDED CARE: Performed by: ORTHOPAEDIC SURGERY

## 2020-07-02 PROCEDURE — 6360000002 HC RX W HCPCS: Performed by: ORTHOPAEDIC SURGERY

## 2020-07-02 PROCEDURE — 7100000001 HC PACU RECOVERY - ADDTL 15 MIN: Performed by: ORTHOPAEDIC SURGERY

## 2020-07-02 PROCEDURE — 7100000010 HC PHASE II RECOVERY - FIRST 15 MIN: Performed by: ORTHOPAEDIC SURGERY

## 2020-07-02 PROCEDURE — 2709999900 HC NON-CHARGEABLE SUPPLY: Performed by: ORTHOPAEDIC SURGERY

## 2020-07-02 PROCEDURE — 3600000004 HC SURGERY LEVEL 4 BASE: Performed by: ORTHOPAEDIC SURGERY

## 2020-07-02 PROCEDURE — 7100000011 HC PHASE II RECOVERY - ADDTL 15 MIN: Performed by: ORTHOPAEDIC SURGERY

## 2020-07-02 PROCEDURE — 3700000001 HC ADD 15 MINUTES (ANESTHESIA): Performed by: ORTHOPAEDIC SURGERY

## 2020-07-02 PROCEDURE — 6360000002 HC RX W HCPCS: Performed by: NURSE ANESTHETIST, CERTIFIED REGISTERED

## 2020-07-02 PROCEDURE — 2580000003 HC RX 258: Performed by: ORTHOPAEDIC SURGERY

## 2020-07-02 PROCEDURE — 2720000010 HC SURG SUPPLY STERILE: Performed by: ORTHOPAEDIC SURGERY

## 2020-07-02 RX ORDER — SODIUM CHLORIDE 0.9 % (FLUSH) 0.9 %
10 SYRINGE (ML) INJECTION EVERY 12 HOURS SCHEDULED
Status: DISCONTINUED | OUTPATIENT
Start: 2020-07-02 | End: 2020-07-02 | Stop reason: HOSPADM

## 2020-07-02 RX ORDER — PROPOFOL 10 MG/ML
INJECTION, EMULSION INTRAVENOUS PRN
Status: DISCONTINUED | OUTPATIENT
Start: 2020-07-02 | End: 2020-07-02 | Stop reason: SDUPTHER

## 2020-07-02 RX ORDER — ACETAMINOPHEN 10 MG/ML
INJECTION, SOLUTION INTRAVENOUS PRN
Status: DISCONTINUED | OUTPATIENT
Start: 2020-07-02 | End: 2020-07-02 | Stop reason: SDUPTHER

## 2020-07-02 RX ORDER — SODIUM CHLORIDE 0.9 % (FLUSH) 0.9 %
10 SYRINGE (ML) INJECTION PRN
Status: DISCONTINUED | OUTPATIENT
Start: 2020-07-02 | End: 2020-07-02 | Stop reason: HOSPADM

## 2020-07-02 RX ORDER — ROPIVACAINE HYDROCHLORIDE 5 MG/ML
INJECTION, SOLUTION EPIDURAL; INFILTRATION; PERINEURAL
Status: COMPLETED | OUTPATIENT
Start: 2020-07-02 | End: 2020-07-02

## 2020-07-02 RX ORDER — ONDANSETRON 2 MG/ML
INJECTION INTRAMUSCULAR; INTRAVENOUS PRN
Status: DISCONTINUED | OUTPATIENT
Start: 2020-07-02 | End: 2020-07-02 | Stop reason: SDUPTHER

## 2020-07-02 RX ORDER — FENTANYL CITRATE 50 UG/ML
INJECTION, SOLUTION INTRAMUSCULAR; INTRAVENOUS PRN
Status: DISCONTINUED | OUTPATIENT
Start: 2020-07-02 | End: 2020-07-02 | Stop reason: SDUPTHER

## 2020-07-02 RX ORDER — LIDOCAINE HYDROCHLORIDE 20 MG/ML
INJECTION, SOLUTION INTRAVENOUS PRN
Status: DISCONTINUED | OUTPATIENT
Start: 2020-07-02 | End: 2020-07-02 | Stop reason: SDUPTHER

## 2020-07-02 RX ORDER — DEXAMETHASONE SODIUM PHOSPHATE 4 MG/ML
INJECTION, SOLUTION INTRA-ARTICULAR; INTRALESIONAL; INTRAMUSCULAR; INTRAVENOUS; SOFT TISSUE PRN
Status: DISCONTINUED | OUTPATIENT
Start: 2020-07-02 | End: 2020-07-02 | Stop reason: SDUPTHER

## 2020-07-02 RX ORDER — SODIUM CHLORIDE, SODIUM LACTATE, POTASSIUM CHLORIDE, CALCIUM CHLORIDE 600; 310; 30; 20 MG/100ML; MG/100ML; MG/100ML; MG/100ML
INJECTION, SOLUTION INTRAVENOUS CONTINUOUS
Status: DISCONTINUED | OUTPATIENT
Start: 2020-07-02 | End: 2020-07-02 | Stop reason: HOSPADM

## 2020-07-02 RX ORDER — CEFAZOLIN SODIUM 2 G/50ML
2 SOLUTION INTRAVENOUS
Status: COMPLETED | OUTPATIENT
Start: 2020-07-02 | End: 2020-07-02

## 2020-07-02 RX ORDER — KETOROLAC TROMETHAMINE 30 MG/ML
15 INJECTION, SOLUTION INTRAMUSCULAR; INTRAVENOUS ONCE
Status: COMPLETED | OUTPATIENT
Start: 2020-07-02 | End: 2020-07-02

## 2020-07-02 RX ADMIN — ONDANSETRON HYDROCHLORIDE 4 MG: 4 INJECTION, SOLUTION INTRAMUSCULAR; INTRAVENOUS at 11:40

## 2020-07-02 RX ADMIN — ACETAMINOPHEN 1000 MG: 10 INJECTION, SOLUTION INTRAVENOUS at 11:53

## 2020-07-02 RX ADMIN — LIDOCAINE HYDROCHLORIDE 100 MG: 20 INJECTION, SOLUTION INTRAVENOUS at 11:38

## 2020-07-02 RX ADMIN — Medication 10 ML: at 11:22

## 2020-07-02 RX ADMIN — DEXAMETHASONE SODIUM PHOSPHATE 8 MG: 4 INJECTION, SOLUTION INTRAMUSCULAR; INTRAVENOUS at 11:40

## 2020-07-02 RX ADMIN — CEFAZOLIN SODIUM 2 G: 2 SOLUTION INTRAVENOUS at 11:40

## 2020-07-02 RX ADMIN — SODIUM CHLORIDE, POTASSIUM CHLORIDE, SODIUM LACTATE AND CALCIUM CHLORIDE: 600; 310; 30; 20 INJECTION, SOLUTION INTRAVENOUS at 11:22

## 2020-07-02 RX ADMIN — PROPOFOL 150 MG: 10 INJECTION, EMULSION INTRAVENOUS at 11:38

## 2020-07-02 RX ADMIN — FENTANYL CITRATE 100 MCG: 50 INJECTION INTRAMUSCULAR; INTRAVENOUS at 11:38

## 2020-07-02 RX ADMIN — KETOROLAC TROMETHAMINE 15 MG: 30 INJECTION, SOLUTION INTRAMUSCULAR; INTRAVENOUS at 13:53

## 2020-07-02 ASSESSMENT — PULMONARY FUNCTION TESTS
PIF_VALUE: 7
PIF_VALUE: 11
PIF_VALUE: 13
PIF_VALUE: 12
PIF_VALUE: 11
PIF_VALUE: 14
PIF_VALUE: 11
PIF_VALUE: 1
PIF_VALUE: 11
PIF_VALUE: 5
PIF_VALUE: 11
PIF_VALUE: 2
PIF_VALUE: 11
PIF_VALUE: 24
PIF_VALUE: 11
PIF_VALUE: 18
PIF_VALUE: 20
PIF_VALUE: 25
PIF_VALUE: 1
PIF_VALUE: 1
PIF_VALUE: 23
PIF_VALUE: 11
PIF_VALUE: 17
PIF_VALUE: 2
PIF_VALUE: 11
PIF_VALUE: 11
PIF_VALUE: 24
PIF_VALUE: 11
PIF_VALUE: 18
PIF_VALUE: 20
PIF_VALUE: 11
PIF_VALUE: 18
PIF_VALUE: 11
PIF_VALUE: 12
PIF_VALUE: 21
PIF_VALUE: 11
PIF_VALUE: 14

## 2020-07-02 ASSESSMENT — PAIN - FUNCTIONAL ASSESSMENT: PAIN_FUNCTIONAL_ASSESSMENT: 0-10

## 2020-07-02 ASSESSMENT — PAIN DESCRIPTION - ORIENTATION
ORIENTATION: RIGHT
ORIENTATION: RIGHT

## 2020-07-02 ASSESSMENT — PAIN SCALES - GENERAL
PAINLEVEL_OUTOF10: 8
PAINLEVEL_OUTOF10: 8
PAINLEVEL_OUTOF10: 5

## 2020-07-02 ASSESSMENT — PAIN DESCRIPTION - DESCRIPTORS: DESCRIPTORS: ACHING

## 2020-07-02 ASSESSMENT — PAIN DESCRIPTION - PAIN TYPE
TYPE: SURGICAL PAIN
TYPE: SURGICAL PAIN

## 2020-07-02 ASSESSMENT — PAIN DESCRIPTION - LOCATION: LOCATION: KNEE

## 2020-07-02 NOTE — OP NOTE
DATE OF PROCEDURE: 7/2/2020    PREOPERATIVE DIAGNOSIS:   1. Left knee medial meniscus tear. 2.  Left knee DJD   3. Left knee loose body    POSTOPERATIVE DIAGNOSES:  1. Left knee medial meniscus tear and lateral meniscus degenerative fraying. 2.  Left knee DJD with grade 2-3 of the patella, grade 2 of the lateral femoral condyle and lateral tibial plateau and grade 3-4 of the medial femoral condyle . 3. Left knee loose body    PROCEDURES:  1. Diagnostic and operative arthroscopy of left knee with partial medial and lateral   meniscectomy. 2.  Left knee arthroscopy with chondroplasty of the medial and lateral tibial compartments as well as the patella . 3. Left knee arthroscopy with loose body removal.    ATTENDING SURGEON:  Linda Slater DO    ANESTHESIA:  General.    ESTIMATED BLOOD LOSS:  5 mL. TOTAL TOURNIQUET TIME:  16 minutes. FLUIDS:  900 mL of crystalloids. INDICATIONS FOR PROCEDURE:  The patient is a 66-year-old male with a long history of pain in the left knee. For this he underwent conservative treatment with no relief of his symptoms. MRI was subsequently obtained. Given  his persistent symptoms despite conservative treatment and with his MRI  findings, I recommended surgical treatment. I explained the risks,  benefits and possible complications of the procedure to the patient and  after answering all of his questions, he consented to undergo the above  procedure. REPORT OF PROCEDURE:  The patient was seen and evaluated in the  preoperative holding area where the left lower extremity was signed in his  presence. At this point, care of the patient was turned over to anesthesia  team, was transported back to the operative suite. He was placed supine on  the operating table with the left lower extremity in leg talamantes. General  anesthesia was applied and once adequate anesthesia was obtained, the left  lower extremity was prepped and draped in usual sterile fashion. Preoperative antibiotics were administered. At this point, a time-out was  performed and all in attendance were in agreement. I exsanguinated the left lower extremity using Esmarch and tourniquet was  inflated to 250 mmHg. I then made standard anteromedial and anterolateral  portal incisions and inserted arthroscopic instrumentation into the knee  joint through the anterolateral portal.  At this point, I performed a  standard diagnostic arthroscopy evaluating the patellofemoral compartment,  femoral notch, medial and lateral compartments of the knee, as well as the  medial and lateral gutters of the knee. Within the patellofemoral  compartment, I found the cartilage on the femoral trochlea  to be intact with normal tracking of the patella. I did find there to be grade 2 arthritic change on the patella. I then used the ArthroCare 1 perform a chondroplasty of the patella debriding loose articular cartilage back to solid stable border circumferentially. I then evaluated the  femoral notch and found the ACL and PCL to be intact. Also within the patellofemoral compartment I did find there to be a small loose body of articular cartilage floating. I then used the arthroscopic grasper to remove the loose body without difficulty. I then turned my attention to the lateral compartment of the knee. Within the lateral  compartment, I found there to be extensive grade 2-3 arthritic changes on the lateral femoral condyle and lateral tibial plateau. ExtensiveThe lateral meniscus was probed and found to be  Intact with the exception of degenerative fraying at the junction of the posterior horn and body of the lateral meniscus. I then used the ArthroCare wand performing minimal chondroplasty of the lateral tibial plateau debriding loose articular cartilage back to solid stable borders circumferentially.   I also used the ArthroCare wand performing minimal debridement of the lateral meniscus debriding it back to solid meniscal tissue circumferentially. I then turned my attention to the medial compartment of the knee. Within  the medial compartment I found there to be extensive grade 3-4 arthritic change on the medial femoral condyle with loose articular cartilage around the lesions. I also probed the medial meniscus and found there to be a horizontal tear through the posterior horn of the medial meniscus. I then used the ArthroCare wand to perform a chondroplasty of the medial femoral condyle debriding loose articular cartilage back to solid stable border circumferentially. I then used the arthroscopic biters to debride the posterior horn beyond the horizontal tear. I then used the arthroscopic shaver to remove all loose meniscal debris. I then used the Arthrocare wand to complete the debridement of the medial meniscus until bringing it back  to solid meniscal tissue circumferentially. I then evaluated the medial and lateral gutters of the knee and found there to be no additional pathology present. Arthroscopic instrumentation was then removed from the knee. Excess fluid was then drained from the knee. Skin incisions were then closed using 3-0  nylon suture. The tourniquet was then deflated for a total of 16 minutes  and adequate hemostasis was maintained. I then injected approximately 20 mL of half percent plain ropivacaine into the knee joint. I then applied a sterile soft  dressing to the left lower extremity. The patient was then awakened from  anesthesia and transported to PACU in stable condition. He appeared to  have tolerated the procedure well. PROGNOSIS:  At this point, he will be discharged to home with a short  course of oral antibiotics as well as pain medication. He can have  immediate weightbearing as tolerated, range of motion as tolerated on the  left leg.   I will see him back in the office in 2 weeks for suture  removal and continue to monitor his progress in the outpatient setting for  resolution of his symptoms.         Kavita Casanova, DO
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
## 2020-07-02 NOTE — ANESTHESIA POSTPROCEDURE EVALUATION
Department of Anesthesiology  Postprocedure Note    Patient: Randi Bah Sr. MRN: [id number]  YOB: 1945  Date of evaluation: 7/2/2020  Time:  1:30 PM     Procedure Summary     Date:  07/02/20 Room / Location:  [address]    Anesthesia Start:  8635 Anesthesia Stop:  8957    Procedure:  RIGHT KNEE ARTHROSCOPY, PARTIAL MEDIAL AND LATERAL MENISCECTOMY, CHONDROPLASTY, AND LOOSE BODY REMOVAL (Right Knee) Diagnosis:  (PRIMARY OSTEOARTHRITIS OF RIGHT KNEE)    Surgeon:  Pollo Beck DO Responsible Provider:  JULIA Alston CRNA    Anesthesia Type:  general ASA Status:  2          Anesthesia Type: No value filed. Jluis Phase I: Jluis Score: 10    Jluis Phase II: Jluis Score: 10    Last vitals: Reviewed and per EMR flowsheets.        Anesthesia Post Evaluation    Patient location during evaluation: bedside  Patient participation: complete - patient participated  Level of consciousness: awake and alert  Pain score: 3  Airway patency: patent  Nausea & Vomiting: no nausea and no vomiting  Complications: no  Cardiovascular status: blood pressure returned to baseline  Respiratory status: acceptable  Hydration status: euvolemic

## 2020-07-02 NOTE — PROGRESS NOTES
Pt. Awake, alert, and oriented x 4. On room air. VS WNLs. Pt. Tolerating ice with no c/o nausea. Pt does c/o some mild, tolerable pain in his right knee. Repositioned and ice pack is in place. Dressing to right knee is dr/intact. No drainage noted. IV infusing without difficulty. SCD to E. Will transport pt. Back to [facility] via cart at this time.

## 2020-07-22 ENCOUNTER — OFFICE VISIT (OUTPATIENT)
Dept: ORTHOPEDIC SURGERY | Age: 75
End: 2020-07-22

## 2020-07-22 VITALS — WEIGHT: 157 LBS | HEIGHT: 66 IN | BODY MASS INDEX: 25.23 KG/M2

## 2020-07-22 PROCEDURE — 99024 POSTOP FOLLOW-UP VISIT: CPT | Performed by: ORTHOPAEDIC SURGERY

## 2020-07-22 NOTE — PATIENT INSTRUCTIONS
Continue weight-bearing as tolerated. Continue range of motion exercises as instructed. Ice and elevate as needed. Tylenol or Motrin for pain.   Follow up in one month

## 2020-07-24 ASSESSMENT — ENCOUNTER SYMPTOMS
COLOR CHANGE: 0
BACK PAIN: 0
CHEST TIGHTNESS: 0
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
## 2020-08-26 ENCOUNTER — TELEPHONE (OUTPATIENT)
Dept: ORTHOPEDIC SURGERY | Age: 75
End: 2020-08-26

## 2020-08-26 ENCOUNTER — OFFICE VISIT (OUTPATIENT)
Dept: ORTHOPEDIC SURGERY | Age: 75
End: 2020-08-26

## 2020-08-26 VITALS — WEIGHT: 150 LBS | RESPIRATION RATE: 16 BRPM | BODY MASS INDEX: 24.11 KG/M2 | HEIGHT: 66 IN

## 2020-08-26 PROCEDURE — 99024 POSTOP FOLLOW-UP VISIT: CPT | Performed by: ORTHOPAEDIC SURGERY

## 2020-08-26 NOTE — PROGRESS NOTES
Patient here 6 weekspostoperatively for his right knee medial and lateral meniscectomy with loose body removal. Pain is still persistent and feels as though he would benefit from PT. He takes tylenol PRN, ices and elevates.     Date of surgery: 7/2/2020  Provider needs to conduct a hands on physical today due to patients injury/diagnosis

## 2020-08-26 NOTE — PATIENT INSTRUCTIONS
Office will submit for right knee gel injections   Once approved and in stock, we will call to set-up appts  Physical therapy ordered   Follow up for GEL injections as discussed

## 2020-08-26 NOTE — TELEPHONE ENCOUNTER
Please submit for ORTHOVISC injections on right knee, once approved and in stock schedule appts at [facility], thanks

## 2020-08-27 ASSESSMENT — ENCOUNTER SYMPTOMS
BACK PAIN: 0
CHEST TIGHTNESS: 0
COLOR CHANGE: 0
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
## 2020-09-01 ENCOUNTER — HOSPITAL ENCOUNTER (OUTPATIENT)
Dept: PHYSICAL THERAPY | Age: 75
Setting detail: THERAPIES SERIES
Discharge: HOME | End: 2020-09-01
Payer: MEDICARE

## 2020-09-01 PROCEDURE — 97110 THERAPEUTIC EXERCISES: CPT

## 2020-09-01 PROCEDURE — 97162 PT EVAL MOD COMPLEX 30 MIN: CPT

## 2020-09-01 ASSESSMENT — PAIN DESCRIPTION - ORIENTATION: ORIENTATION: RIGHT

## 2020-09-01 ASSESSMENT — PAIN SCALES - GENERAL: PAINLEVEL_OUTOF10: 1

## 2020-09-01 ASSESSMENT — PAIN DESCRIPTION - PROGRESSION: CLINICAL_PROGRESSION: NOT CHANGED

## 2020-09-01 ASSESSMENT — PAIN DESCRIPTION - PAIN TYPE: TYPE: SURGICAL PAIN

## 2020-09-01 ASSESSMENT — PAIN - FUNCTIONAL ASSESSMENT: PAIN_FUNCTIONAL_ASSESSMENT: PREVENTS OR INTERFERES WITH ALL ACTIVE AND SOME PASSIVE ACTIVITIES

## 2020-09-01 ASSESSMENT — PAIN DESCRIPTION - FREQUENCY: FREQUENCY: INTERMITTENT

## 2020-09-01 ASSESSMENT — PAIN DESCRIPTION - LOCATION: LOCATION: KNEE

## 2020-09-01 NOTE — FLOWSHEET NOTE
Outpatient Physical Therapy  [place]           [x] Phone: [phone]   Fax: [phone]  [facility] n[facility]           [] Phone: [phone]   Fax: [phone]        Physical Therapy Daily Treatment Note  Date:  2020    Patient Name:  Brayan Schwartz    :  1945  MRN: [id number]  Restrictions/Precautions:  Other position/activity restrictions: none  Diagnosis:   Diagnosis: R knee  arthroscopy 20 , PARTIAL MEDIAL AND LATERAL MENISCECTOMY, CHONDROPLASTY, AND LOOSE BODY REMOVAL  Date of Injury/Surgery:   Treatment Diagnosis: Treatment Diagnosis: R knee stiffness/impaired mobility    Insurance/Certification information: PT Insurance Information: Medicare advantage   Referring Physician:  Referring Practitioner: Darren Lama Doctor Visit:    Plan of care signed (Y/N):    Outcome Measure: STS with hands on chair 5 in 15 sec, 9 in 30 sec; LEFS 35/80  Visit# / total visits:  1 /10 the PN  Pain level: 1/10   Goals:       Short term goals  Time Frame for Short term goals: 10 sessions  Short term goal 1: 38/80 LFS indicating improved LE function since starting PT  Short term goal 2: I and complaint with HEP indicating patient's preparation for discharge from PT  Long term goals  Time Frame for Long term goals : 10 weeks-11/15/20  Long term goal 1: patient's goal - increse R knee ROM/ decrease R knee pain  Long term goal 2: up/down 4 steps in pT clinic  w reciprocal pattern indicating  return of more normal knee function/mechanics  Long term goal 3: 5 STS with hands across chext in less than 15 sec indicating increase leg strength  Long term goal 4: 44/80 LEFS indicating significantly improved LE function    Summary of Evaluation: Assessment: STS with hands on chair 5 in 15 sec, 9 in 30 sec  ASSESSMENT  Patient primary complaints:  R knee stiffness/impaired mobility    History of condition: R knee  arthroscopy 20   Current functional limitations: difficulty with standing/walking x 30 min; difficulty with stooping  Clinical findings: STS with hands on chair 5 in 15 sec, 9 in 30 sec; LEFS 35/80; R AROM: 130* to -15*; ambulating with cane   PLOF:- retired, did not use cane-enjoys riding motorcycle- ADL/IADL not limited as much by current limitations  Skilled PT interventions are intended to: address  Knee ROM/ leg strength which will enable patient  to returnto PLOF and improve quality of life  Patient agrees with established plan of care and assisted in the development of their  goals  Barriers to learning:none- no mental/cognitive barriers observed  Preferred learning style(s):   written- demonstration -practice  Preferred Language: English  Potential barriers to progress:none  The patient appears motivated to participate in PT and regain PLOF: yes      Subjective:  See eval         Any changes in Ambulatory Summary Sheet? None        Objective:  See eval   Prior to today's treatment session, patient was screened for signs and symptoms related to COVID-19 including but not limited to verbally answering questions related to feeling ill, cough, or SOB, along with taking temperature via forehead thermometer. Patient presented with all negative signs and symptoms and had no fever >100 degrees Fahrenheit this date.          Exercises: (No more than 4 columns)   Exercise/Equipment Date  9/1/20 Date Date           WARM UP         nusrep Seat 7 /8/9 arms 8 load 4            TABLE      SAQ  FR     SLR X 10 reps     Heel prop small roll x 3 min     Seated knee EXT stretch with OP- patient's hand above knee X 1 minute              STANDING                                                     PROPRIOCEPTION                                    MODALITIES                      Other Therapeutic Activities/Education:        Home Exercise Program:  9/1/20- SLR and Seated knee EXT stretch with OP- patient's hand above knee- patient demonstrated and expressed understanding provided with handout      Manual Treatments: Modalities:        Communication with other providers:        Assessment:  (Response towards treatment session) (Pain Rating)    Pt tolerated tx well without any adverse reactions or complications this date.  . Pt would continue to benefit from skilled therapy interventions to address remaining impairments, improve mobility and strength, finalize HEP,  and progress toward goal completion and prepare for d/c ; end session pain rating 1/10 - patient felt looser      Plan for Next Session:  bike      Time In / Time Out: see eval            Timed Code/Total Treatment Minutes:  22' TE x2/ 50' includes eval    Next Progress Note due:        Plan of Care Interventions:  [x] Therapeutic Exercise  [] Modalities:  [x] Therapeutic Activity     [] Ultrasound  [] Estim  [x] Gait Training      [] Cervical Traction [] Lumbar Traction  [x] Neuromuscular Re-education    [x] Cold/hotpack [] Iontophoresis   [x] Instruction in HEP      [x] Vasopneumatic   [] Dry Needling    [x] Manual Therapy               [x] Aquatic Therapy              Electronically signed by:  Jose Alfredo Matos 9/1/2020, 6:12 PM

## 2020-09-01 NOTE — PLAN OF CARE
Outpatient Physical Therapy           [place]           [] Phone: [phone]   Fax: [phone]  [facility] nunez           [] Phone: [phone]   Fax: [phone]     To: Referring Practitioner: Ledy Enrique    From: Wesley Rincon, PT     Patient: Hi Faria Sr.       : 1945  Diagnosis: Diagnosis: R knee  arthroscopy 20 RIGHT KNEE ARTHROSCOPY, PARTIAL MEDIAL AND LATERAL MENISCECTOMY, CHONDROPLASTY, AND LOOSE BODY REMOVAL   Treatment Diagnosis: Treatment Diagnosis: R knee stiffness/impaired mobility   Date: 2020    Physical Therapy Certification/Re-Certification Form    The following patient has been evaluated for physical therapy services and for therapy to continue, insurance requires physician review of the treatment plan initially and every 90 days. Please review the attached evaluation and/or summary of the patient's plan of care, and verify that you agree therapy should continue by signing the attached document and sending it back to our office.     Assessment:    Patient primary complaints:  R knee stiffness/impaired mobility    History of condition: R knee  arthroscopy 20   Current functional limitations: difficulty with standing/walking x 30 min; difficulty with stooping  Clinical findings: STS with hands on chair 5 in 15 sec, 9 in 30 sec; LEFS 35/80; R AROM: 130* to -15*; ambulating with cane   PLOF:- retired, did not use cane-enjoys riding motorcycle- ADL/IADL not limited as much by current limitations  Skilled PT interventions are intended to: address  Knee ROM/ leg strength which will enable patient  to returnto PLOF and improve quality of life  Patient agrees with established plan of care and assisted in the development of their  goals  Barriers to learning:none- no mental/cognitive barriers observed  Preferred learning style(s):   written- demonstration -practice  Preferred Language: English  Potential barriers to progress:none  The patient appears motivated to participate in PT and regain PLOF: yes  Plan of Care/Treatment to date:  [x] Therapeutic Exercise  [] Modalities:  [x] Therapeutic Activity     [] Ultrasound  [] Electrical Stimulation  [x] Gait Training      [] Cervical Traction [] Lumbar Traction  [x] Neuromuscular Re-education    [x] Cold/hotpack [] Iontophoresis   [x] Instruction in HEP      [x] Vasopneumatic     [x] Manual Therapy               [] Aquatic Therapy       :    Frequency/Duration:  # Days per week: [] 1 day # Weeks: [] 1 week [] 5 weeks     [x] 2 days   [] 2 weeks [] 6 weeks     [] 3 days   [] 3 weeks [] 7 weeks     [] 4 days   [] 4 weeks [] 8 weeks         [] 9 weeks [x] 10 weeks         [] 11 weeks [] 12 weeks    Rehab Potential/Progress: [] Excellent [x] Good [] Fair  [] Poor     Goals:      Short term goals  Time Frame for Short term goals: 10 sessions  Short term goal 1: 38/80 LFS indicating improved LE function since starting PT  Short term goal 2: I and complaint with HEP indicating patient's preparation for discharge from PT  Long term goals  Time Frame for Long term goals : 10 weeks  Long term goal 1: patient's goal - increse R knee ROM/ decrease R knee pain  Long term goal 2: up/down 4 steps in pT clinic  w reciprocal pattern indicating  return of more normal knee function/mechanics  Long term goal 3: 5 STS with hands across chext in less than 15 sec indicating increase leg strength  Long term goal 4: 44/80 LEFS indicating significantly imporved LE function      Electronically signed by:  Harjinder Greene PT, 9/1/2020, 6:20 PM        If you have any questions or concerns, please don't hesitate to call.   Thank you for your referral.      Physician Signature:________________________________Date:_________ TIME: _____  By signing above, therapists plan is approved by physician

## 2020-09-01 NOTE — PROGRESS NOTES
Physical Therapy  Initial Assessment  Date: 2020  Patient Name: Cas Dey. MRN: [id number]  : 1945     Treatment Diagnosis: R knee stiffness/impaired mobility    Restrictions  Position Activity Restriction  Other position/activity restrictions: none    Subjective   General  Chart Reviewed:  Yes  Additional Pertinent Hx: L knee surgery 10/10/17  Family / Caregiver Present: No  Referring Practitioner: Aurelia Newell  Diagnosis: R knee  arthroscopy 20 RIGHT KNEE ARTHROSCOPY, PARTIAL MEDIAL AND LATERAL MENISCECTOMY, CHONDROPLASTY, AND LOOSE BODY REMOVAL  Follows Commands: Within Functional Limits  PT Visit Information  PT Insurance Information: Medicare advantage  Subjective  Subjective: R knee arthroscopy 20 RIGHT KNEE ARTHROSCOPY, PARTIAL MEDIAL AND LATERAL MENISCECTOMY, CHONDROPLASTY, AND LOOSE BODY REMOVAL; difficulty with standing/walking x 30 min; difficulty with stooping  Pain Assessment  Pain Assessment: 0-10  Pain Level: 1(max pain 10/10 in past week related to activity)  Patient's Stated Pain Goal: No pain  Pain Type: Surgical pain  Pain Location: Knee  Pain Orientation: Right  Pain Frequency: Intermittent  Clinical Progression: Not changed  Effect of Pain on Daily Activities: impacts on most  ADL/IADL  Functional Pain Assessment: Prevents or interferes with all active and some passive activities    Vision/Hearing  Vision  Vision: Impaired(macular degeneration)  Hearing  Hearing Exceptions: Hard of hearing/hearing concerns    Orientation       Social/Functional History  Social/Functional History  Lives With: Alone  Type of Home: House  Home Layout: One level  Home Access: Stairs to enter with rails  Bathroom Shower/Tub: Tub/Shower unit  Bathroom Toilet: Standard  ADL Assistance: Independent  Homemaking Assistance: Independent  Ambulation Assistance: Independent  Transfer Assistance: Independent  Active : Yes  Mode of Transportation: Car;Motorcycle  Occupation: Retired  Leisure & Hobbies: Riding his motorcycle. Objective     Observation/Palpation  Posture: Good  Palpation: R medial condyle TTP  Scar: R suprapatella 14.75  L 14.25    AROM RLE (degrees)  R Knee Flexion 0-145: 130* to -15*    Strength RLE  R Knee Extension: At least;4-/5                      Ambulation  Ambulation?: Yes  Ambulation 1  Device: Single point cane  Assistance: Modified Independent  Gait Deviations: Slow Patricia                            Assessment   Conditions Requiring Skilled Therapeutic Intervention  Body structures, Functions, Activity limitations: Decreased functional mobility ; Decreased ADL status; Decreased ROM; Decreased strength  Assessment: STS with hands on chair 5 in 15 sec, 9 in 30 sec  Treatment Diagnosis: R knee stiffness/impaired mobility  Decision Making: Medium Complexity  History: PF- post op healing issues  Exam: R knee ROM/ LEFS  Clinical Presentation: changing characterstics of function due to stifness/pain  Barriers to Learning: none         Plan        G-Code       OutComes Score                                                  AM-PAC Score             Goals  Short term goals  Time Frame for Short term goals: 10 sessions  Short term goal 1: 38/80 LFS indicating improved LE function since starting PT  Short term goal 2: I and complaint with HEP indicating patient's preparation for discharge from PT  Long term goals  Time Frame for Long term goals : 10 weeks  Long term goal 1: patient's goal - increse R knee ROM/ decrease R knee pain  Long term goal 2: up/down 4 steps in pT clinic  w reciprocal pattern indicating  return of more normal knee function/mechanics  Long term goal 3: 5 STS with hands across chext in less than 15 sec indicating increase leg strength  Long term goal 4: 44/80 LEFS indicating significantly imporved LE function       Therapy Time   Individual Concurrent Group Co-treatment   Time In 0925         Time Out 1015         Minutes 50         Timed Code Treatment Minutes: 25 Minutes       Farhan Hernandez, PT

## 2020-09-04 ENCOUNTER — HOSPITAL ENCOUNTER (OUTPATIENT)
Dept: PHYSICAL THERAPY | Age: 75
Setting detail: THERAPIES SERIES
Discharge: HOME | End: 2020-09-04
Payer: MEDICARE

## 2020-09-04 PROCEDURE — 97530 THERAPEUTIC ACTIVITIES: CPT

## 2020-09-04 PROCEDURE — 97110 THERAPEUTIC EXERCISES: CPT

## 2020-09-04 PROCEDURE — 97112 NEUROMUSCULAR REEDUCATION: CPT

## 2020-09-04 NOTE — FLOWSHEET NOTE
Outpatient Physical Therapy  [place]           [x] Phone: [phone]   Fax: [phone]  [facility]           [] Phone: [phone]   Fax: [phone]        Physical Therapy Daily Treatment Note  Date:  2020    Patient Name:  Steph Villafana    :  1945  MRN: [id number]  Restrictions/Precautions:  Other position/activity restrictions: none  Diagnosis:   Diagnosis: R knee  arthroscopy 20 , PARTIAL MEDIAL AND LATERAL MENISCECTOMY, CHONDROPLASTY, AND LOOSE BODY REMOVAL  Date of Injury/Surgery:   Treatment Diagnosis: Treatment Diagnosis: R knee stiffness/impaired mobility    Insurance/Certification information: PT Insurance Information: Medicare advantage   Referring Physician:  Referring Practitioner: Scott Lama Doctor Visit:    Plan of care signed (Y/N):    Outcome Measure: STS with hands on chair 5 in 15 sec, 9 in 30 sec; LEFS 35/80  Visit# / total visits:  2 /10 the PN  Pain level: 1/10   Goals:       Short term goals  Time Frame for Short term goals: 10 sessions  Short term goal 1: 38/80 LFS indicating improved LE function since starting PT  Short term goal 2: I and complaint with HEP indicating patient's preparation for discharge from PT  Long term goals  Time Frame for Long term goals : 10 weeks-11/15/20  Long term goal 1: patient's goal - increse R knee ROM/ decrease R knee pain  Long term goal 2: up/down 4 steps in pT clinic  w reciprocal pattern indicating  return of more normal knee function/mechanics  Long term goal 3: 5 STS with hands across chext in less than 15 sec indicating increase leg strength  Long term goal 4: 44/80 LEFS indicating significantly improved LE function    Summary of Evaluation: Assessment: STS with hands on chair 5 in 15 sec, 9 in 30 sec  ASSESSMENT  Patient primary complaints:  R knee stiffness/impaired mobility    History of condition: R knee  arthroscopy 20   Current functional limitations: difficulty with standing/walking x 30 min; difficulty with stooping  Clinical findings: STS with hands on chair 5 in 15 sec, 9 in 30 sec; LEFS 35/80; R AROM: 130* to -15*; ambulating with cane   PLOF:- retired, did not use cane-enjoys riding motorcycle- ADL/IADL not limited as much by current limitations  Skilled PT interventions are intended to: address  Knee ROM/ leg strength which will enable patient  to returnto PLOF and improve quality of life  Patient agrees with established plan of care and assisted in the development of their  goals  Barriers to learning:none- no mental/cognitive barriers observed  Preferred learning style(s):   written- demonstration -practice  Preferred Language: English  Potential barriers to progress:none  The patient appears motivated to participate in PT and regain PLOF: yes      Subjective:   Patient reports sleeping better        Any changes in Ambulatory Summary Sheet? None        Objective:  Ambulating with cane  Prior to today's treatment session, patient was screened for signs and symptoms related to COVID-19 including but not limited to verbally answering questions related to feeling ill, cough, or SOB, along with taking temperature via forehead thermometer. Patient presented with all negative signs and symptoms and had no fever >100 degrees Fahrenheit this date.          Exercises: (No more than 4 columns)   Exercise/Equipment Date  9/1/20 Date 9/4/20 Date           WARM UP         nustep Seat 7 /8/9 arms 8 load 4  Seat 7 /8/9 arms 8 load 5     bike  5'    TABLE      SAQ  FR FR  5 ct x15    SLR X 10 reps x10    Heel prop small roll x 3 min small roll x 5'    Seated knee EXT stretch with OP- patient's hand above knee X 1 minute X 2'             STANDING      High marches  x1 lap // bars    FR calf stretch  1'    4\"  FWD step,ups/ step back/down    2\"   FWD step overs / step back/down                           PROPRIOCEPTION                                    MODALITIES                      Other Therapeutic Activities/Education: Home Exercise Program:  9/1/20- SLR and Seated knee EXT stretch with OP- patient's hand above knee- patient demonstrated and expressed understanding provided with handout      Manual Treatments:        Modalities:        Communication with other providers:        Assessment:  (Response towards treatment session) (Pain Rating)    Pt tolerated tx well without any adverse reactions or complications this date.  . Pt would continue to benefit from skilled therapy interventions to address remaining impairments, improve mobility and strength, finalize HEP,  and progress toward goal completion and prepare for d/c ; end session pain rating 1/10 - patient felt looser      Plan for Next Session:        Time In / Time Out: 933/1020        Timed Code/Total Treatment Minutes:  52' 20 TE x1  19' TA x1, 8' NRE x1/47'    Next Progress Note due:        Plan of Care Interventions:  [x] Therapeutic Exercise  [] Modalities:  [x] Therapeutic Activity     [] Ultrasound  [] Estim  [x] Gait Training      [] Cervical Traction [] Lumbar Traction  [x] Neuromuscular Re-education    [x] Cold/hotpack [] Iontophoresis   [x] Instruction in HEP      [x] Vasopneumatic   [] Dry Needling    [x] Manual Therapy               [x] Aquatic Therapy              Electronically signed by:  Markie Murillo 9/4/2020, 9:33 AM

## 2020-09-09 ENCOUNTER — HOSPITAL ENCOUNTER (OUTPATIENT)
Dept: PHYSICAL THERAPY | Age: 75
Setting detail: THERAPIES SERIES
Discharge: HOME | End: 2020-09-09
Payer: MEDICARE

## 2020-09-09 PROCEDURE — 97140 MANUAL THERAPY 1/> REGIONS: CPT

## 2020-09-09 PROCEDURE — 97530 THERAPEUTIC ACTIVITIES: CPT

## 2020-09-09 PROCEDURE — 97110 THERAPEUTIC EXERCISES: CPT

## 2020-09-09 PROCEDURE — 97112 NEUROMUSCULAR REEDUCATION: CPT

## 2020-09-09 NOTE — TELEPHONE ENCOUNTER
Patient wants to wait and do a couple more of PT to see if it does help the knee and he wants to sit down and discuss gel injections.

## 2020-09-09 NOTE — FLOWSHEET NOTE
Outpatient Physical Therapy  [place]           [x] Phone: [phone]   Fax: [phone]  [facility] nunez           [] Phone: [phone]   Fax: [phone]        Physical Therapy Daily Treatment Note  Date:  2020    Patient Name:  Litzy Robledo    :  1945  MRN: [id number]  Restrictions/Precautions:  Other position/activity restrictions: none  Diagnosis:   Diagnosis: R knee  arthroscopy 20 , PARTIAL MEDIAL AND LATERAL MENISCECTOMY, CHONDROPLASTY, AND LOOSE BODY REMOVAL  Date of Injury/Surgery:   Treatment Diagnosis: Treatment Diagnosis: R knee stiffness/impaired mobility    Insurance/Certification information: PT Insurance Information: Medicare advantage   Referring Physician:  Referring Practitioner: Inna Lama Doctor Visit:    Plan of care signed (Y/N):    Outcome Measure: STS with hands on chair 5 in 15 sec, 9 in 30 sec; LEFS 35/80  Visit# / total visits:  3 /10 the PN  Pain level: 1/10   Goals:       Short term goals  Time Frame for Short term goals: 10 sessions  Short term goal 1: 38/80 LFS indicating improved LE function since starting PT  Short term goal 2: I and complaint with HEP indicating patient's preparation for discharge from PT  Long term goals  Time Frame for Long term goals : 10 weeks-11/15/20  Long term goal 1: patient's goal - increse R knee ROM/ decrease R knee pain  Long term goal 2: up/down 4 steps in pT clinic  w reciprocal pattern indicating  return of more normal knee function/mechanics  Long term goal 3: 5 STS with hands across chext in less than 15 sec indicating increase leg strength  Long term goal 4: 44/80 LEFS indicating significantly improved LE function    Summary of Evaluation: Assessment: STS with hands on chair 5 in 15 sec, 9 in 30 sec  ASSESSMENT  Patient primary complaints:  R knee stiffness/impaired mobility    History of condition: R knee  arthroscopy 20   Current functional limitations: difficulty with standing/walking x 30 min; difficulty step back/down   2\"   FWD step overs / step back/down FWD step overs / step back/down   Mini squats   Hold FM arm x10 reps   Hamstring machine   20#   3 x10              PROPRIOCEPTION                                    MODALITIES                      Other Therapeutic Activities/Education:        Home Exercise Program:  9/1/20- SLR and Seated knee EXT stretch with OP- patient's hand above knee- patient demonstrated and expressed understanding provided with handout      Manual Treatments:  Patella mobs, tib/fem glides      Modalities:        Communication with other providers:        Assessment:  (Response towards treatment session) (Pain Rating)    Pt tolerated tx well without any adverse reactions or complications this date.  . Pt would continue to benefit from skilled therapy interventions to address remaining impairments, improve mobility and strength, finalize HEP,  and progress toward goal completion and prepare for d/c ; end session pain rating 1/10 - patient felt looser      Plan for Next Session:        Time In / Time Out: 1520/1615        Timed Code/Total Treatment Minutes:   25' TE x1  8' MT x1, 8' NRE x1   17' TA x1 /55'    Next Progress Note due:        Plan of Care Interventions:  [x] Therapeutic Exercise  [] Modalities:  [x] Therapeutic Activity     [] Ultrasound  [] Estim  [x] Gait Training      [] Cervical Traction [] Lumbar Traction  [x] Neuromuscular Re-education    [x] Cold/hotpack [] Iontophoresis   [x] Instruction in HEP      [x] Vasopneumatic   [] Dry Needling    [x] Manual Therapy               [x] Aquatic Therapy              Electronically signed by:  Zenon Gambino 9/9/2020, 3:20 PM

## 2020-09-11 ENCOUNTER — HOSPITAL ENCOUNTER (OUTPATIENT)
Dept: PHYSICAL THERAPY | Age: 75
Setting detail: THERAPIES SERIES
Discharge: HOME | End: 2020-09-11
Payer: MEDICARE

## 2020-09-11 PROCEDURE — 97530 THERAPEUTIC ACTIVITIES: CPT

## 2020-09-11 PROCEDURE — 97112 NEUROMUSCULAR REEDUCATION: CPT

## 2020-09-11 PROCEDURE — 97110 THERAPEUTIC EXERCISES: CPT

## 2020-09-11 NOTE — FLOWSHEET NOTE
standing/walking x 30 min; difficulty with stooping  Clinical findings: STS with hands on chair 5 in 15 sec, 9 in 30 sec; LEFS 35/80; R AROM: 130* to -15*; ambulating with cane   PLOF:- retired, did not use cane-enjoys riding motorcycle- ADL/IADL not limited as much by current limitations  Skilled PT interventions are intended to: address  Knee ROM/ leg strength which will enable patient  to returnto PLOF and improve quality of life  Patient agrees with established plan of care and assisted in the development of their  goals  Barriers to learning:none- no mental/cognitive barriers observed  Preferred learning style(s):   written- demonstration -practice  Preferred Language: English  Potential barriers to progress:none  The patient appears motivated to participate in PT and regain PLOF: yes      Subjective:   Patient reports swelling and pain  in R knee has been increased since last session       Any changes in Ambulatory Summary Sheet? None        Objective:  Ambulating with  cane- 5 STS in 8 sec  Prior to today's treatment session, patient was screened for signs and symptoms related to COVID-19 including but not limited to verbally answering questions related to feeling ill, cough, or SOB, along with taking temperature via forehead thermometer. Patient presented with all negative signs and symptoms and had no fever >100 degrees Fahrenheit this date.          Exercises: (No more than 4 columns)   Exercise/Equipment Date  9/1/20 Date 9/4/20 Date 9/9/20 #3 9/11/20 #4            WARM UP          nustep Seat 7  /8/9 arms 8 load 4  Seat 7 /8/9 arms 8 load 5  Seat 7 then 5,  Then /8/9 arms 8 load 5 x10' Seat 7 then 9 arms 8 load 4  x10'   bike  5' 5' --   TABLE       SAQ  FR FR  5 ct x15 FR  5 ct 2  x15 FR  5 ct 1  x15   SLR X 10 reps x10 ---    Heel prop small roll x 3 min small roll x 5' small roll x 5' small roll x 5'   Seated knee EXT stretch with OP- patient's hand above knee X 1 minute X 2' HEP --      FLEX AAROM With strap 10 ct x10 reps With strap 2 ct x10 reps   STANDING       High marches  x1 lap // bars x2 laps // bars x2 laps // bars   FR calf stretch  1' 1' 1\"   4\"  FWD step,ups/ step back/down 6\"FWD step,ups/ step back/down 4\"FWD step,ups/ step back/ramu   2\"   FWD step overs / step back/down FWD step overs / step back/down --   Mini squats   Hold FM arm x10 reps --   Hamstring machine   20#   3 x10 --        LAQ    5# 3 x10  5 ct   PROPRIOCEPTION                                          MODALITIES                         Other Therapeutic Activities/Education:        Home Exercise Program:  9/1/20- SLR and Seated knee EXT stretch with OP- patient's hand above knee- patient demonstrated and expressed understanding provided with handout      Manual Treatments:       Modalities:        Communication with other providers:        Assessment:  (Response towards treatment session) (Pain Rating)    Pt tolerated tx well overall  did have medial R knee pain when twisting on R knee.  Pt would continue to benefit from skilled therapy interventions to address remaining impairments, improve mobility and strength, finalize HEP,  and progress toward goal completion and prepare for d/c ; end session pain rating 3/10 -R medial knee joint      Plan for Next Session:  bike      Time In / Time Out: 1432/1510        Timed Code/Total Treatment Minutes:   25' TE x1   8' NRE x1   8' TA x1 /38'    Next Progress Note due:        Plan of Care Interventions:  [x] Therapeutic Exercise  [] Modalities:  [x] Therapeutic Activity     [] Ultrasound  [] Estim  [x] Gait Training      [] Cervical Traction [] Lumbar Traction  [x] Neuromuscular Re-education    [x] Cold/hotpack [] Iontophoresis   [x] Instruction in HEP      [x] Vasopneumatic   [] Dry Needling    [x] Manual Therapy               [x] Aquatic Therapy              Electronically signed by:  Markie Alcala 9/11/2020, 2:33 PM

## 2020-09-16 ENCOUNTER — HOSPITAL ENCOUNTER (OUTPATIENT)
Dept: PHYSICAL THERAPY | Age: 75
Setting detail: THERAPIES SERIES
Discharge: HOME | End: 2020-09-16
Payer: MEDICARE

## 2020-09-16 PROCEDURE — 97110 THERAPEUTIC EXERCISES: CPT

## 2020-09-16 PROCEDURE — 97140 MANUAL THERAPY 1/> REGIONS: CPT

## 2020-09-16 NOTE — FLOWSHEET NOTE
Outpatient Physical Therapy  [facility]           [x] Phone: [phone]   Fax: [phone]  [facility]           [] Phone: [phone]   Fax: [phone]        Physical Therapy Daily Treatment Note  Date:  2020    Patient Name:  Goble Najjar    :  1945  MRN: [id number]  Restrictions/Precautions:  Other position/activity restrictions: none  Diagnosis:   Diagnosis: R knee  arthroscopy 20 , PARTIAL MEDIAL AND LATERAL MENISCECTOMY, CHONDROPLASTY, AND LOOSE BODY REMOVAL  Date of Injury/Surgery:   Treatment Diagnosis: Treatment Diagnosis: R knee stiffness/impaired mobility    Insurance/Certification information: PT Insurance Information: Medicare advantage   Referring Physician:  Referring Practitioner: Karen Lama Doctor Visit:    Plan of care signed (Y/N):    Outcome Measure: STS with hands on chair 5 in 15 sec, 9 in 30 sec; LEFS 35/80  Visit# / total visits: 5 /10 the PN  Pain level: 10 R knee sharp brief pain when walking  Goals:       Short term goals  Time Frame for Short term goals: 10 sessions  Short term goal 1: 38/80 LFS indicating improved LE function since starting PT  Short term goal 2: I and complaint with HEP indicating patient's preparation for discharge from PT  Long term goals  Time Frame for Long term goals : 10 weeks-11/15/20  Long term goal 1: patient's goal - increse R knee ROM/ decrease R knee pain  Long term goal 2: up/down 4 steps in pT clinic  w reciprocal pattern indicating  return of more normal knee function/mechanics  Long term goal 3: 5 STS with hands across chext in less than 15 sec indicating increase leg strength  Long term goal 4: 44/80 LEFS indicating significantly improved LE function    Summary of Evaluation: Assessment: STS with hands on chair 5 in 15 sec, 9 in 30 sec  ASSESSMENT  Patient primary complaints:  R knee stiffness/impaired mobility    History of condition: R knee  arthroscopy 20   Current functional limitations: difficulty with standing/walking x 30 min; difficulty with stooping  Clinical findings: STS with hands on chair 5 in 15 sec, 9 in 30 sec; LEFS 35/80; R AROM: 130* to -15*; ambulating with cane   PLOF:- retired, did not use cane-enjoys riding motorcycle- ADL/IADL not limited as much by current limitations  Skilled PT interventions are intended to: address  Knee ROM/ leg strength which will enable patient  to returnto PLOF and improve quality of life  Patient agrees with established plan of care and assisted in the development of their  goals  Barriers to learning:none- no mental/cognitive barriers observed  Preferred learning style(s):   written- demonstration -practice  Preferred Language: English  Potential barriers to progress:none  The patient appears motivated to participate in PT and regain PLOF: yes      Subjective:   Patient rates his knee pain 9/10 especially with walking and movement. Also gets some shooting pain into the shin. Feels that at times he drags his foot when he first starts walking after he has been sitting. Any changes in Ambulatory Summary Sheet? None        Objective: 134* knee flexion AAROM  Prior to today's treatment session, patient was screened for signs and symptoms related to COVID-19 including but not limited to verbally answering questions related to feeling ill, cough, or SOB, along with taking temperature via forehead thermometer. Patient presented with all negative signs and symptoms and had no fever >100 degrees Fahrenheit this date.          Exercises: (No more than 4 columns)   Exercise/Equipment Date  9/1/20 Date 9/4/20 Date 9/9/20 #3 9/11/20 #4 9/16/2020  #5             WARM UP           nustep Seat 7 /8/9 arms 8 load 4  Seat 7 /8/9 arms 8 load 5  Seat 7 then 5,  Then /8/9 arms 8 load 5 x10' Seat 7 then 9 arms 8 load 4  x10' Seat 7 then 9 arms 8 load 4  x10'   bike  5' 5' --    TABLE        SAQ  FR FR  5 ct x15 FR  5 ct 2  x15 FR  5 ct 1  x15 FR  5 ct 1  x15   SLR X 10 reps x10 --- Heel prop small roll x 3 min small roll x 5' small roll x 5' small roll x 5' small roll x 5'   Seated knee EXT stretch with OP- patient's hand above knee X 1 minute X 2' HEP --       FLEX AAROM   With strap 10 ct x10 reps With strap 2 ct x10 reps With strap 2 ct x10 reps   STANDING        High marches  x1 lap // bars x2 laps // bars x2 laps // bars 3 laps // bars   FR calf stretch  1' 1' 1\" 1 min    4\"  FWD step,ups/ step back/down 6\"FWD step,ups/ step back/down 4\"FWD step,ups/ step back/ramu 4\" Fwd step ups   2\"   FWD step overs / step back/down FWD step overs / step back/down --    Mini squats   Hold FM arm x10 reps --    Hamstring machine   20#   3 x10 --         LAQ    5# 3 x10  5 ct 5# 3x10   PROPRIOCEPTION                                                MODALITIES                            Other Therapeutic Activities/Education:        Home Exercise Program:  9/1/20- SLR and Seated knee EXT stretch with OP- patient's hand above knee- patient demonstrated and expressed understanding provided with handout      Manual Treatments: Ice massage to patellar tendon region and medial knee    Modalities:        Communication with other providers:        Assessment:  (Response towards treatment session) (Pain Rating) Did not rate his pain after treatment, but was feeling much better than when he got here. Pt tolerated tx well overall  did have medial R knee pain when twisting on R knee.  Pt would continue to benefit from skilled therapy interventions to address remaining impairments, improve mobility and strength, finalize HEP,  and progress toward goal completion and prepare for d/c ; end session pain rating 3/10 -R medial knee joint      Plan for Next Session:  bike      Time In / Time Out: 1510/1550        Timed Code/Total Treatment Minutes:   36' (10' MT, 30' TE)    Next Progress Note due:        Plan of Care Interventions:  [x] Therapeutic Exercise  [] Modalities:  [x] Therapeutic Activity     [] Ultrasound  [] Estim  [x] Gait Training      [] Cervical Traction [] Lumbar Traction  [x] Neuromuscular Re-education    [x] Cold/hotpack [] Iontophoresis   [x] Instruction in HEP      [x] Vasopneumatic   [] Dry Needling    [x] Manual Therapy               [x] Aquatic Therapy              Electronically signed by:  Claus Bryant   9/16/2020, 3:10 PM

## 2020-09-22 ENCOUNTER — HOSPITAL ENCOUNTER (OUTPATIENT)
Dept: PHYSICAL THERAPY | Age: 75
Setting detail: THERAPIES SERIES
Discharge: HOME | End: 2020-09-22
Payer: MEDICARE

## 2020-09-22 PROCEDURE — 97110 THERAPEUTIC EXERCISES: CPT

## 2020-09-22 PROCEDURE — 97140 MANUAL THERAPY 1/> REGIONS: CPT

## 2020-09-22 NOTE — FLOWSHEET NOTE
Outpatient Physical Therapy  [facility]           [x] Phone: [phone]   Fax: [phone]  [facility] [place]           [] Phone: [phone]   Fax: [phone]        Physical Therapy Daily Treatment Note  Date:  2020    Patient Name:  Dede Bolanos    :  1945  MRN: [id number]  Restrictions/Precautions:  Other position/activity restrictions: none  Diagnosis:   Diagnosis: R knee  arthroscopy 20 , PARTIAL MEDIAL AND LATERAL MENISCECTOMY, CHONDROPLASTY, AND LOOSE BODY REMOVAL  Date of Injury/Surgery:   Treatment Diagnosis: Treatment Diagnosis: R knee stiffness/impaired mobility    Insurance/Certification information: PT Insurance Information: Medicare advantage   Referring Physician:  Referring Practitioner: Enriqueta Barajas  Next Doctor Visit:    Plan of care signed (Y/N):    Outcome Measure: STS with hands on chair 5 in 15 sec, 9 in 30 sec; LEFS 35/80  Visit# / total visits: 6 /10 the PN  Pain level: 10 R knee sharp brief pain when walking  Goals:       Short term goals  Time Frame for Short term goals: 10 sessions  Short term goal 1: 38/80 LFS indicating improved LE function since starting PT  Short term goal 2: I and complaint with HEP indicating patient's preparation for discharge from PT  Long term goals  Time Frame for Long term goals : 10 weeks-11/15/20  Long term goal 1: patient's goal - increse R knee ROM/ decrease R knee pain  Long term goal 2: up/down 4 steps in pT clinic  w reciprocal pattern indicating  return of more normal knee function/mechanics  Long term goal 3: 5 STS with hands across chext in less than 15 sec indicating increase leg strength  Long term goal 4: 44/80 LEFS indicating significantly improved LE function    Summary of Evaluation: Assessment: STS with hands on chair 5 in 15 sec, 9 in 30 sec  ASSESSMENT  Patient primary complaints:  R knee stiffness/impaired mobility    History of condition: R knee  arthroscopy 20   Current functional limitations: difficulty with standing/walking x 30 min; difficulty with stooping  Clinical findings: STS with hands on chair 5 in 15 sec, 9 in 30 sec; LEFS 35/80; R AROM: 130* to -15*; ambulating with cane   PLOF:- retired, did not use cane-enjoys riding motorcycle- ADL/IADL not limited as much by current limitations  Skilled PT interventions are intended to: address  Knee ROM/ leg strength which will enable patient  to returnto PLOF and improve quality of life  Patient agrees with established plan of care and assisted in the development of their  goals  Barriers to learning:none- no mental/cognitive barriers observed  Preferred learning style(s):   written- demonstration -practice  Preferred Language: English  Potential barriers to progress:none  The patient appears motivated to participate in PT and regain PLOF: yes      Subjective:  Patient reports walking without cane for past 2 weeks      Any changes in Ambulatory Summary Sheet? None        Objective: sharp R knee pain with step down from 4\" step  Prior to today's treatment session, patient was screened for signs and symptoms related to COVID-19 including but not limited to verbally answering questions related to feeling ill, cough, or SOB, along with taking temperature via forehead thermometer. Patient presented with all negative signs and symptoms and had no fever >100 degrees Fahrenheit this date.          Exercises: (No more than 4 columns)   Exercise/Equipment 9/11/20 #4 9/16/2020  #5 9/22/20 #6           WARM UP         nustep Seat 7 then 9 arms 8 load 4  x10' Seat 7 then 9 arms 8 load 4  x10'  arms 8 load 45  x10'   bike --  --   TABLE      SAQ FR  5 ct 1  x15 FR  5 ct 1  x15 FR  5 ct 2  x15   Heel prop small roll x 5' small roll x 5' small roll x 5'  3# on      FLEX AAROM With strap 2 ct x10 reps With strap 2 ct x10 reps Patient performed self AAROM    STANDING      High marches x2 laps // bars 3 laps // bars 3 laps // bars   FR calf stretch 1\" 1 min  1 min   4\" 4\"FWD step,ups/ step back/ramu 4\" Fwd step ups 4\" Fwd step ups   2\"  --  R step down - unable due to sharp R infrapatellar knee pain   Mini squats --  --   Hamstring machine --  --        LAQ 5# 3 x10  5 ct 5# 3x10 5# 3 x10  5 ct   PROPRIOCEPTION                                    MODALITIES                      Other Therapeutic Activities/Education:        Home Exercise Program:  9/1/20- SLR and Seated knee EXT stretch with OP- patient's hand above knee- patient demonstrated and expressed understanding provided with handout      Manual Treatments: Ice massage to patellar tendon region and medial knee; supine R knee passive  EXT  ROM  with OP    Modalities:        Communication with other providers:        Assessment:  (Response towards treatment session) (Pain Rating) Did not rate his pain after treatment, but was feeling much better than when he got here. Pt tolerated tx well overall  did have  R knee pain when attempting step down on R knee.  Pt would continue to benefit from skilled therapy interventions to address remaining impairments, improve mobility and strength, finalize HEP,  and progress toward goal completion and prepare for d/c ; end session pain rating 3/10       Plan for Next Session:  bike      Time In / Time Out: 1508/1550        Timed Code/Total Treatment Minutes:   43' (10' MT, 32' TE)    Next Progress Note due:        Plan of Care Interventions:  [x] Therapeutic Exercise  [] Modalities:  [x] Therapeutic Activity     [] Ultrasound  [] Estim  [x] Gait Training      [] Cervical Traction [] Lumbar Traction  [x] Neuromuscular Re-education    [x] Cold/hotpack [] Iontophoresis   [x] Instruction in HEP      [x] Vasopneumatic   [] Dry Needling    [x] Manual Therapy               [x] Aquatic Therapy              Electronically signed by:  Marie Ledbetter   9/22/2020, 3:07 PM

## 2020-09-24 ENCOUNTER — HOSPITAL ENCOUNTER (OUTPATIENT)
Dept: PHYSICAL THERAPY | Age: 75
Setting detail: THERAPIES SERIES
Discharge: HOME | End: 2020-09-24
Payer: MEDICARE

## 2020-09-24 PROCEDURE — 97110 THERAPEUTIC EXERCISES: CPT

## 2020-09-24 PROCEDURE — 97140 MANUAL THERAPY 1/> REGIONS: CPT

## 2020-09-24 NOTE — FLOWSHEET NOTE
Outpatient Physical Therapy  [place]           [x] Phone: [phone]   Fax: [phone]  [facility]           [] Phone: [phone]   Fax: [phone]        Physical Therapy Daily Treatment Note  Date:  2020    Patient Name:  Steph Villafana    :  1945  MRN: [id number]  Restrictions/Precautions:  Other position/activity restrictions: none  Diagnosis:   Diagnosis: R knee  arthroscopy 20 , PARTIAL MEDIAL AND LATERAL MENISCECTOMY, CHONDROPLASTY, AND LOOSE BODY REMOVAL  Date of Injury/Surgery:   Treatment Diagnosis: Treatment Diagnosis: R knee stiffness/impaired mobility    Insurance/Certification information: PT Insurance Information: Medicare advantage   Referring Physician:  Referring Practitioner: Scott Lama Doctor Visit:    Plan of care signed (Y/N):    Outcome Measure: STS with hands on chair 5 in 15 sec, 9 in 30 sec; LEFS 35/80  Visit# / total visits: 6 /10 the PN  Pain level: 0/10     Goals:       Short term goals  Time Frame for Short term goals: 10 sessions  Short term goal 1: 38/80 LFS indicating improved LE function since starting PT  Short term goal 2: I and complaint with HEP indicating patient's preparation for discharge from PT  Long term goals  Time Frame for Long term goals : 10 weeks-11/15/20  Long term goal 1: patient's goal - increse R knee ROM/ decrease R knee pain  Long term goal 2: up/down 4 steps in pT clinic  w reciprocal pattern indicating  return of more normal knee function/mechanics  Long term goal 3: 5 STS with hands across chext in less than 15 sec indicating increase leg strength  Long term goal 4: 44/80 LEFS indicating significantly improved LE function    Summary of Evaluation: Assessment: STS with hands on chair 5 in 15 sec, 9 in 30 sec  ASSESSMENT  Patient primary complaints:  R knee stiffness/impaired mobility    History of condition: R knee  arthroscopy 20   Current functional limitations: difficulty with standing/walking x 30 min; difficulty with stooping  Clinical findings: STS with hands on chair 5 in 15 sec, 9 in 30 sec; LEFS 35/80; R AROM: 130* to -15*; ambulating with cane   PLOF:- retired, did not use cane-enjoys riding motorcycle- ADL/IADL not limited as much by current limitations  Skilled PT interventions are intended to: address  Knee ROM/ leg strength which will enable patient  to returnto PLOF and improve quality of life  Patient agrees with established plan of care and assisted in the development of their  goals  Barriers to learning:none- no mental/cognitive barriers observed  Preferred learning style(s):   written- demonstration -practice  Preferred Language: English  Potential barriers to progress:none  The patient appears motivated to participate in PT and regain PLOF: yes      Subjective: Patient denies any knee pain this morning. Had a little bit of pain this morning, but put some Biofreeze on the knee and that has helped. States that his knee was feeling good last night so he was able to sleep well. Any changes in Ambulatory Summary Sheet? None        Objective: Sharp pain in the right knee with 2\" step down    Prior to today's treatment session, patient was screened for signs and symptoms related to COVID-19 including but not limited to verbally answering questions related to feeling ill, cough, or SOB, along with taking temperature via forehead thermometer. Patient presented with all negative signs and symptoms and had no fever >100 degrees Fahrenheit this date.          Exercises: (No more than 4 columns)   Exercise/Equipment 9/11/20 #4 9/16/2020  #5 9/22/20 #6 9/24/2020  #7            WARM UP          nustep Seat 7 then 9 arms 8 load 4  x10' Seat 7 then 9 arms 8 load 4  x10'  arms 8 load 45  x10' arms 8 load 45  x10'   bike --  --    TABLE       SAQ FR  5 ct 1  x15 FR  5 ct 1  x15 FR  5 ct 2  x15 FR 5 ct 2x15   Heel prop small roll x 5' small roll x 5' small roll x 5'  3# on Small roll 3#, 5 min       FLEX AAROM With strap 2

## 2020-09-28 ENCOUNTER — OFFICE VISIT (OUTPATIENT)
Dept: ORTHOPEDIC SURGERY | Age: 75
End: 2020-09-28
Payer: MEDICARE

## 2020-09-28 VITALS — WEIGHT: 150 LBS | HEIGHT: 66 IN | RESPIRATION RATE: 16 BRPM | BODY MASS INDEX: 24.11 KG/M2

## 2020-09-28 PROCEDURE — 20610 DRAIN/INJ JOINT/BURSA W/O US: CPT | Performed by: ORTHOPAEDIC SURGERY

## 2020-09-28 PROCEDURE — 99024 POSTOP FOLLOW-UP VISIT: CPT | Performed by: ORTHOPAEDIC SURGERY

## 2020-09-28 NOTE — PROGRESS NOTES
Patient here for a follow up on his chronic right knee pain s/p right knee arthroscopy medial and lateral meniscectomy with loose body removal. He is here to discuss the [address] injections, they have been approved per his insurance. His pain is medial and hurts after WB.     Date of surgery: 7/2/2020      Provider needs to conduct a hands on physical today due to patients injury/diagnosis

## 2020-09-28 NOTE — PATIENT INSTRUCTIONS
[facility] #1injection   Rest right knee today   Work on ROM and strengthening of knees/legs   follow up as scheduled for injection #2

## 2020-09-29 ASSESSMENT — ENCOUNTER SYMPTOMS
COLOR CHANGE: 0
BACK PAIN: 0
CHEST TIGHTNESS: 0
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
## 2020-10-05 ENCOUNTER — OFFICE VISIT (OUTPATIENT)
Dept: ORTHOPEDIC SURGERY | Age: 75
End: 2020-10-05
Payer: MEDICARE

## 2020-10-05 VITALS — RESPIRATION RATE: 18 BRPM | BODY MASS INDEX: 24.11 KG/M2 | HEIGHT: 66 IN | WEIGHT: 150 LBS

## 2020-10-05 PROCEDURE — 20610 DRAIN/INJ JOINT/BURSA W/O US: CPT | Performed by: ORTHOPAEDIC SURGERY

## 2020-10-05 NOTE — PATIENT INSTRUCTIONS
SYNVISC #2 injection   Rest right knee today   Work on ROM and strengthening of knees/legs   follow up as scheduled for injection #3 with PA Roland

## 2020-10-05 NOTE — PROGRESS NOTES
Patient here for right knee ORTHOVISC# 2 gel injection. He states he does see a minor improvement in his knee pain.     Provider needs to conduct a hands on physical today due to patients injury/diagnosis

## 2020-10-06 ASSESSMENT — ENCOUNTER SYMPTOMS
BACK PAIN: 0
COLOR CHANGE: 0
CHEST TIGHTNESS: 0
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
## 2020-10-12 ENCOUNTER — OFFICE VISIT (OUTPATIENT)
Dept: ORTHOPEDIC SURGERY | Age: 75
End: 2020-10-12
Payer: MEDICARE

## 2020-10-12 VITALS
OXYGEN SATURATION: 98 % | WEIGHT: 150 LBS | BODY MASS INDEX: 24.11 KG/M2 | HEIGHT: 66 IN | HEART RATE: 76 BPM | RESPIRATION RATE: 16 BRPM

## 2020-10-12 PROCEDURE — 20610 DRAIN/INJ JOINT/BURSA W/O US: CPT | Performed by: PHYSICIAN ASSISTANT

## 2020-10-12 NOTE — PROGRESS NOTES
VISCO-SUPPLEMENTATION INJECTION (Number 3)  I reviewed and agree with the portions of the HPI, review of systems, vital documentation and plan performed by my staff and have added/addended where appropriate. HISTORY OF PRESENT ILLNESS (HPI):   Bella Steele is a 66y.o. year old male who is here for follow up for Orthovisc visco-supplementation injection number 3 for right knee pain. PAST HISTORY:   Unless otherwise specified in this note, the past history is reviewed today with the patient and is as follows-    Allergies   Allergen Reactions    Other      \"Allergic To Hay, Grass, Perfume, Candle Scents, Crop Dust Causing Problems Breathing, Swallowing, Hoarseness Of Voice\"    Oxycodone Itching    Percocet [Oxycodone-Acetaminophen] Itching     \"Feels Like Bugs Crawling On Me\"    Oxycodone Hcl        Current Outpatient Medications   Medication Sig Dispense Refill    hydrocortisone 2.5 % cream       Multiple Vitamins-Minerals (ICAPS AREDS 2 PO) Take by mouth every morning Over The Counter       No current facility-administered medications for this visit. PHYSICAL EXAM:   Pulse 76   Resp 16   Ht 5' 6\" (1.676 m)   Wt 150 lb (68 kg)   SpO2 98%   BMI 24.21 kg/m²     The right knee is examined:  Inspection:  Skin is intact. No erythema. Palpation:  No swelling or acute tenderness. Neuro/Vascular/Motor:  Sensation to light touch intact. Capillary refill brisk. No focal motor deficits. DIAGNOSIS:     1. Primary osteoarthritis of right knee        PROCEDURE:   Right Knee Aspiration / Injection Procedure:  Multiple treatment options were discussed. Joint injection was recommended. Details of the procedure, potential risks, and potential benefits were discussed. Patient's questions were answered. Patient elected to proceed with procedure.   Medication: Orthovisc  NDC #: [id number]  Lot #: [id number]  Procedure:  Sterile technique was used as the skin over the injection site was prepped with alcohol. The right knee joint was then injected with the above listed medication. A sterile bandage was placed over the injection site. The patient tolerated the procedure well without complication. The patient is scheduled for evaluation of visco-supplementation injection therapy in 1 month.

## 2020-11-01 NOTE — DISCHARGE SUMMARY
patient's goal - increse R knee ROM/ decrease R knee pain  Long term goal 2: up/down 4 steps in pT clinic  w reciprocal pattern indicating  return of more normal knee function/mechanics  Long term goal 3: 5 STS with hands across chext in less than 15 sec indicating increase leg strength  Long term goal 4: 44/80 LEFS indicating significantly imporved LE function         [x] Patient now discharged- has not scheduled further OP PT    Electronically signed by:  Farhan Hernandez PT, 11/1/2020, 11:05 AM    If you have any questions or concerns, please don't hesitate to call.   Thank you for your referral.

## 2020-11-03 ENCOUNTER — TELEPHONE (OUTPATIENT)
Dept: FAMILY MEDICINE CLINIC | Age: 75
End: 2020-11-03

## 2020-12-01 ENCOUNTER — TELEPHONE (OUTPATIENT)
Dept: FAMILY MEDICINE CLINIC | Age: 75
End: 2020-12-01

## 2020-12-02 NOTE — TELEPHONE ENCOUNTER
Called and spoke to patient he does not want to see DR. Manny Woods he has seen his before.  I scheduled patient an appointment with Bianca Callahan to go over his concerns

## 2020-12-08 ENCOUNTER — OFFICE VISIT (OUTPATIENT)
Dept: FAMILY MEDICINE CLINIC | Age: 75
End: 2020-12-08
Payer: MEDICARE

## 2020-12-08 VITALS
DIASTOLIC BLOOD PRESSURE: 82 MMHG | RESPIRATION RATE: 14 BRPM | OXYGEN SATURATION: 98 % | SYSTOLIC BLOOD PRESSURE: 122 MMHG | TEMPERATURE: 97.3 F | BODY MASS INDEX: 25.24 KG/M2 | HEART RATE: 90 BPM | WEIGHT: 156.4 LBS

## 2020-12-08 PROCEDURE — 4040F PNEUMOC VAC/ADMIN/RCVD: CPT | Performed by: NURSE PRACTITIONER

## 2020-12-08 PROCEDURE — 1123F ACP DISCUSS/DSCN MKR DOCD: CPT | Performed by: NURSE PRACTITIONER

## 2020-12-08 PROCEDURE — 1036F TOBACCO NON-USER: CPT | Performed by: NURSE PRACTITIONER

## 2020-12-08 PROCEDURE — G8427 DOCREV CUR MEDS BY ELIG CLIN: HCPCS | Performed by: NURSE PRACTITIONER

## 2020-12-08 PROCEDURE — G8417 CALC BMI ABV UP PARAM F/U: HCPCS | Performed by: NURSE PRACTITIONER

## 2020-12-08 PROCEDURE — 99213 OFFICE O/P EST LOW 20 MIN: CPT | Performed by: NURSE PRACTITIONER

## 2020-12-08 PROCEDURE — G8484 FLU IMMUNIZE NO ADMIN: HCPCS | Performed by: NURSE PRACTITIONER

## 2020-12-08 ASSESSMENT — PATIENT HEALTH QUESTIONNAIRE - PHQ9
SUM OF ALL RESPONSES TO PHQ QUESTIONS 1-9: 0
SUM OF ALL RESPONSES TO PHQ QUESTIONS 1-9: 0
2. FEELING DOWN, DEPRESSED OR HOPELESS: 0
SUM OF ALL RESPONSES TO PHQ QUESTIONS 1-9: 0
SUM OF ALL RESPONSES TO PHQ9 QUESTIONS 1 & 2: 0
1. LITTLE INTEREST OR PLEASURE IN DOING THINGS: 0
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
## 2020-12-09 ENCOUNTER — OFFICE VISIT (OUTPATIENT)
Dept: ORTHOPEDIC SURGERY | Age: 75
End: 2020-12-09
Payer: MEDICARE

## 2020-12-09 VITALS — WEIGHT: 156 LBS | BODY MASS INDEX: 25.07 KG/M2 | RESPIRATION RATE: 16 BRPM | HEIGHT: 66 IN

## 2020-12-09 PROCEDURE — G8427 DOCREV CUR MEDS BY ELIG CLIN: HCPCS | Performed by: PHYSICIAN ASSISTANT

## 2020-12-09 PROCEDURE — 4040F PNEUMOC VAC/ADMIN/RCVD: CPT | Performed by: PHYSICIAN ASSISTANT

## 2020-12-09 PROCEDURE — 1036F TOBACCO NON-USER: CPT | Performed by: PHYSICIAN ASSISTANT

## 2020-12-09 PROCEDURE — 20610 DRAIN/INJ JOINT/BURSA W/O US: CPT | Performed by: PHYSICIAN ASSISTANT

## 2020-12-09 PROCEDURE — G8484 FLU IMMUNIZE NO ADMIN: HCPCS | Performed by: PHYSICIAN ASSISTANT

## 2020-12-09 PROCEDURE — 1123F ACP DISCUSS/DSCN MKR DOCD: CPT | Performed by: PHYSICIAN ASSISTANT

## 2020-12-09 PROCEDURE — 99213 OFFICE O/P EST LOW 20 MIN: CPT | Performed by: PHYSICIAN ASSISTANT

## 2020-12-09 PROCEDURE — G8417 CALC BMI ABV UP PARAM F/U: HCPCS | Performed by: PHYSICIAN ASSISTANT

## 2020-12-09 ASSESSMENT — ENCOUNTER SYMPTOMS
RESPIRATORY NEGATIVE: 1
EYES NEGATIVE: 1
GASTROINTESTINAL NEGATIVE: 1

## 2020-12-09 NOTE — PROGRESS NOTES
Ed returns for follow up on his right knee. He has scope done on July of this year. He reports his pain has returned and is the same as it was before surgery. Pain today is 10/10 and is located on the medial side of the knee and radiates down the front of his perez. Ed uses [employer] Gel with little improvement.

## 2020-12-09 NOTE — PATIENT INSTRUCTIONS
Continue to weight bear as tolerated  Continue range of motion  Ice and elevate as needed  Tylenol or Motrin for pain  Injection given today in the office   Rest for 24-48 hours  Follow up as needed
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
## 2020-12-10 ENCOUNTER — TELEPHONE (OUTPATIENT)
Dept: INTERNAL MEDICINE CLINIC | Age: 75
End: 2020-12-10

## 2020-12-10 PROBLEM — N40.0 BPH (BENIGN PROSTATIC HYPERPLASIA): Status: ACTIVE | Noted: 2020-12-10

## 2020-12-10 PROBLEM — M17.11 PRIMARY OSTEOARTHRITIS OF RIGHT KNEE: Status: ACTIVE | Noted: 2020-12-10

## 2020-12-10 ASSESSMENT — ENCOUNTER SYMPTOMS
SHORTNESS OF BREATH: 0
SINUS PAIN: 0
EYE PAIN: 0
CONSTIPATION: 0
SINUS PRESSURE: 0
PHOTOPHOBIA: 0
DIARRHEA: 0
WHEEZING: 0
TROUBLE SWALLOWING: 0
CHEST TIGHTNESS: 0
ABDOMINAL PAIN: 0
BACK PAIN: 0
SORE THROAT: 0
VOMITING: 0
BLOOD IN STOOL: 0
COUGH: 0
RHINORRHEA: 0
NAUSEA: 0

## 2020-12-15 ENCOUNTER — TELEPHONE (OUTPATIENT)
Dept: FAMILY MEDICINE CLINIC | Age: 75
End: 2020-12-15

## 2020-12-15 NOTE — TELEPHONE ENCOUNTER
Patient came in and is upset, he has never heard anything from you regarding the test results. He said you were suppose to call him with those results. Patient is concerned with his health.     Please advise ASAP

## 2020-12-16 NOTE — TELEPHONE ENCOUNTER
Called and left a message at Dr. Elyssa Giang office requesting progress notes and to contact his office to go over test results with the patient that was ordered by Dr. Elyssa Giang

## 2020-12-16 NOTE — TELEPHONE ENCOUNTER
Called and left a message with DR. Adilene Dominguez office to request patients past progress notes from past office visit.

## 2021-01-12 ENCOUNTER — HOSPITAL ENCOUNTER (EMERGENCY)
Age: 76
Discharge: HOME | End: 2021-01-12
Attending: EMERGENCY MEDICINE
Payer: MEDICARE

## 2021-01-12 ENCOUNTER — APPOINTMENT (OUTPATIENT)
Dept: GENERAL RADIOLOGY | Age: 76
End: 2021-01-12
Payer: MEDICARE

## 2021-01-12 VITALS
WEIGHT: 165 LBS | TEMPERATURE: 98.4 F | HEART RATE: 88 BPM | DIASTOLIC BLOOD PRESSURE: 88 MMHG | SYSTOLIC BLOOD PRESSURE: 149 MMHG | RESPIRATION RATE: 16 BRPM | OXYGEN SATURATION: 98 % | BODY MASS INDEX: 26.52 KG/M2 | HEIGHT: 66 IN

## 2021-01-12 DIAGNOSIS — T59.811A SMOKE INHALATION: ICD-10-CM

## 2021-01-12 DIAGNOSIS — T23.209A SUPERFICIAL PARTIAL THICKNESS BURN OF HAND: Primary | ICD-10-CM

## 2021-01-12 LAB
ANION GAP SERPL CALCULATED.3IONS-SCNC: 10 MMOL/L (ref 4–16)
BASE EXCESS MIXED: 0.9 (ref 0–1.2)
BASE EXCESS: ABNORMAL (ref 0–3.3)
BASOPHILS ABSOLUTE: 0.1 K/CU MM
BASOPHILS RELATIVE PERCENT: 0.5 % (ref 0–1)
BUN BLDV-MCNC: 15 MG/DL (ref 6–23)
CALCIUM SERPL-MCNC: 9.6 MG/DL (ref 8.3–10.6)
CARBON MONOXIDE, BLOOD: 7 % (ref 0–5)
CHLORIDE BLD-SCNC: 103 MMOL/L (ref 99–110)
CO2: 28 MMOL/L (ref 21–32)
CO2: 28 MMOL/L (ref 21–32)
CREAT SERPL-MCNC: 0.9 MG/DL (ref 0.9–1.3)
DIFFERENTIAL TYPE: ABNORMAL
EOSINOPHILS ABSOLUTE: 0.1 K/CU MM
EOSINOPHILS RELATIVE PERCENT: 0.9 % (ref 0–3)
GFR AFRICAN AMERICAN: >60 ML/MIN/1.73M2
GFR NON-AFRICAN AMERICAN: >60 ML/MIN/1.73M2
GLUCOSE BLD-MCNC: 113 MG/DL (ref 70–99)
GLUCOSE BLD-MCNC: 114 MG/DL (ref 70–99)
HCO3 ARTERIAL: 26.6 MMOL/L (ref 18–23)
HCT VFR BLD CALC: 50.1 % (ref 42–52)
HCT VFR BLD CALC: 54 % (ref 42–52)
HEMOGLOBIN: 17.2 GM/DL (ref 13.5–18)
HEMOGLOBIN: 18.2 GM/DL (ref 13.5–18)
IMMATURE NEUTROPHIL %: 5.6 % (ref 0–0.43)
LYMPHOCYTES ABSOLUTE: 2.1 K/CU MM
LYMPHOCYTES RELATIVE PERCENT: 22.9 % (ref 24–44)
MCH RBC QN AUTO: 32.3 PG (ref 27–31)
MCHC RBC AUTO-ENTMCNC: 34.3 % (ref 32–36)
MCV RBC AUTO: 94 FL (ref 78–100)
MONOCYTES ABSOLUTE: 1 K/CU MM
MONOCYTES RELATIVE PERCENT: 10.9 % (ref 0–4)
O2 SATURATION: 82.5 % (ref 96–97)
PCO2 ARTERIAL: 44.8 MMHG (ref 32–45)
PDW BLD-RTO: 14.2 % (ref 11.7–14.9)
PH BLOOD: 7.38 (ref 7.34–7.45)
PLATELET # BLD: 261 K/CU MM (ref 140–440)
PMV BLD AUTO: 9.9 FL (ref 7.5–11.1)
PO2 ARTERIAL: 48.1 MMHG (ref 75–100)
POC CALCIUM: 1.14 MMOL/L (ref 1.12–1.32)
POC CHLORIDE: 104 MMOL/L (ref 98–109)
POC CREATININE: 0.9 MG/DL (ref 0.9–1.3)
POTASSIUM SERPL-SCNC: 3.8 MMOL/L (ref 3.5–4.5)
POTASSIUM SERPL-SCNC: 4.2 MMOL/L (ref 3.5–5.1)
RBC # BLD: 5.33 M/CU MM (ref 4.6–6.2)
SEGMENTED NEUTROPHILS ABSOLUTE COUNT: 5.5 K/CU MM
SEGMENTED NEUTROPHILS RELATIVE PERCENT: 59.2 % (ref 36–66)
SODIUM BLD-SCNC: 141 MMOL/L (ref 135–145)
SODIUM BLD-SCNC: 142 MMOL/L (ref 138–146)
SOURCE, BLOOD GAS: ABNORMAL
TOTAL IMMATURE NEUTOROPHIL: 0.52 K/CU MM
WBC # BLD: 9.3 K/CU MM (ref 4–10.5)

## 2021-01-12 PROCEDURE — 99285 EMERGENCY DEPT VISIT HI MDM: CPT

## 2021-01-12 PROCEDURE — 96372 THER/PROPH/DIAG INJ SC/IM: CPT

## 2021-01-12 PROCEDURE — 2580000003 HC RX 258: Performed by: EMERGENCY MEDICINE

## 2021-01-12 PROCEDURE — 6360000002 HC RX W HCPCS: Performed by: EMERGENCY MEDICINE

## 2021-01-12 PROCEDURE — 99291 CRITICAL CARE FIRST HOUR: CPT

## 2021-01-12 PROCEDURE — 71045 X-RAY EXAM CHEST 1 VIEW: CPT

## 2021-01-12 PROCEDURE — 90471 IMMUNIZATION ADMIN: CPT | Performed by: EMERGENCY MEDICINE

## 2021-01-12 PROCEDURE — 82375 ASSAY CARBOXYHB QUANT: CPT

## 2021-01-12 PROCEDURE — 90715 TDAP VACCINE 7 YRS/> IM: CPT | Performed by: EMERGENCY MEDICINE

## 2021-01-12 PROCEDURE — 80048 BASIC METABOLIC PNL TOTAL CA: CPT

## 2021-01-12 PROCEDURE — 6370000000 HC RX 637 (ALT 250 FOR IP): Performed by: EMERGENCY MEDICINE

## 2021-01-12 PROCEDURE — 85025 COMPLETE CBC W/AUTO DIFF WBC: CPT

## 2021-01-12 PROCEDURE — 96360 HYDRATION IV INFUSION INIT: CPT

## 2021-01-12 PROCEDURE — 99292 CRITICAL CARE ADDL 30 MIN: CPT

## 2021-01-12 RX ORDER — LIDOCAINE HYDROCHLORIDE 20 MG/ML
JELLY TOPICAL ONCE
Status: COMPLETED | OUTPATIENT
Start: 2021-01-12 | End: 2021-01-12

## 2021-01-12 RX ORDER — ACETAMINOPHEN 325 MG/1
650 TABLET ORAL ONCE
Status: COMPLETED | OUTPATIENT
Start: 2021-01-12 | End: 2021-01-12

## 2021-01-12 RX ORDER — HYDROCODONE BITARTRATE AND ACETAMINOPHEN 5; 325 MG/1; MG/1
1 TABLET ORAL EVERY 6 HOURS PRN
Qty: 20 TABLET | Refills: 0 | Status: SHIPPED | OUTPATIENT
Start: 2021-01-12 | End: 2021-01-17

## 2021-01-12 RX ORDER — 0.9 % SODIUM CHLORIDE 0.9 %
500 INTRAVENOUS SOLUTION INTRAVENOUS ONCE
Status: COMPLETED | OUTPATIENT
Start: 2021-01-12 | End: 2021-01-12

## 2021-01-12 RX ORDER — FENTANYL CITRATE 50 UG/ML
50 INJECTION, SOLUTION INTRAMUSCULAR; INTRAVENOUS ONCE
Status: COMPLETED | OUTPATIENT
Start: 2021-01-12 | End: 2021-01-12

## 2021-01-12 RX ORDER — HYDROCODONE BITARTRATE AND ACETAMINOPHEN 5; 325 MG/1; MG/1
1 TABLET ORAL ONCE
Status: COMPLETED | OUTPATIENT
Start: 2021-01-12 | End: 2021-01-12

## 2021-01-12 RX ADMIN — HYDROCODONE BITARTRATE AND ACETAMINOPHEN 1 TABLET: 5; 325 TABLET ORAL at 05:43

## 2021-01-12 RX ADMIN — FENTANYL CITRATE 50 MCG: 50 INJECTION INTRAMUSCULAR; INTRAVENOUS at 04:50

## 2021-01-12 RX ADMIN — TETANUS TOXOID, REDUCED DIPHTHERIA TOXOID AND ACELLULAR PERTUSSIS VACCINE, ADSORBED 0.5 ML: 5; 2.5; 8; 8; 2.5 SUSPENSION INTRAMUSCULAR at 05:11

## 2021-01-12 RX ADMIN — SODIUM CHLORIDE 500 ML: 9 INJECTION, SOLUTION INTRAVENOUS at 04:58

## 2021-01-12 RX ADMIN — LIDOCAINE HYDROCHLORIDE: 20 JELLY TOPICAL at 06:51

## 2021-01-12 RX ADMIN — ACETAMINOPHEN 650 MG: 325 TABLET ORAL at 05:43

## 2021-01-12 ASSESSMENT — PAIN DESCRIPTION - PAIN TYPE
TYPE: ACUTE PAIN
TYPE: ACUTE PAIN

## 2021-01-12 ASSESSMENT — PAIN DESCRIPTION - ORIENTATION
ORIENTATION: LEFT;RIGHT
ORIENTATION: RIGHT;LEFT

## 2021-01-12 ASSESSMENT — PAIN DESCRIPTION - LOCATION
LOCATION: HAND
LOCATION: HAND

## 2021-01-12 ASSESSMENT — PAIN SCALES - GENERAL: PAINLEVEL_OUTOF10: 10
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
## 2021-01-12 NOTE — ED NOTES
Patients hands were dressed with lidocaine gel, non adherent pads and Kerlix, patient tolerated well.         Ant Greene RN  01/12/21 0144
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
## 2021-01-12 NOTE — ED NOTES
Patient ambulated to restroom independently and returned back to bed. Saline soak gauze put back onto hands. Monitors placed on patient.       Rajeev Bateman RN  01/12/21 4403

## 2021-01-12 NOTE — ED NOTES
Discharge instructions reviewed , as well as picking up his prescriptions, dressing changes was discussed with patients alfredo Santos and patient, pt acknowledges understanding. Ambulatory at discharge.      Jon Mclean RN  01/12/21 3553

## 2021-02-04 ENCOUNTER — TELEPHONE (OUTPATIENT)
Dept: FAMILY MEDICINE CLINIC | Age: 76
End: 2021-02-04

## 2021-02-04 NOTE — TELEPHONE ENCOUNTER
I was able to get in contact with Dr Prakash's office. I spoke to Ciro. She is going to fax his results to our office. I asked her if she can contact pt to call and schedule a f/u appt for him to review his results with the specialist. She said she will do that. When I spoke with her she said that he had an EEG and lab tests done back in May 2020. Pt had f/u appt in June, but pt did not show up to appt. Ciro told me all appts are virtual with the provider. I am unsure if pt was unaware of his f/u appt.
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
## 2021-02-16 PROBLEM — M21.161 ACQUIRED VARUS DEFORMITY KNEE, RIGHT: Status: ACTIVE | Noted: 2021-01-05

## 2021-02-16 PROBLEM — J30.9 ALLERGIC RHINITIS: Status: ACTIVE | Noted: 2021-02-16

## 2021-02-16 PROBLEM — M71.9 BURSITIS: Status: ACTIVE | Noted: 2021-02-16

## 2021-02-16 PROBLEM — M25.561 RIGHT KNEE PAIN: Status: ACTIVE | Noted: 2021-01-05

## 2021-02-16 PROBLEM — Z01.10 EXAMINATION OF EARS AND HEARING: Status: ACTIVE | Noted: 2021-02-16
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
## 2021-02-22 ENCOUNTER — OFFICE VISIT (OUTPATIENT)
Dept: FAMILY MEDICINE CLINIC | Age: 76
End: 2021-02-22
Payer: MEDICARE

## 2021-02-22 VITALS
OXYGEN SATURATION: 97 % | TEMPERATURE: 97.5 F | HEART RATE: 79 BPM | SYSTOLIC BLOOD PRESSURE: 128 MMHG | WEIGHT: 158 LBS | DIASTOLIC BLOOD PRESSURE: 64 MMHG | RESPIRATION RATE: 16 BRPM | BODY MASS INDEX: 25.5 KG/M2

## 2021-02-22 DIAGNOSIS — Z91.81 AT HIGH RISK FOR FALLS: ICD-10-CM

## 2021-02-22 DIAGNOSIS — R39.14 BENIGN PROSTATIC HYPERPLASIA WITH INCOMPLETE BLADDER EMPTYING: ICD-10-CM

## 2021-02-22 DIAGNOSIS — N40.1 BENIGN PROSTATIC HYPERPLASIA WITH INCOMPLETE BLADDER EMPTYING: ICD-10-CM

## 2021-02-22 DIAGNOSIS — M17.11 PRIMARY OSTEOARTHRITIS OF RIGHT KNEE: ICD-10-CM

## 2021-02-22 DIAGNOSIS — M17.11 ARTHRITIS OF RIGHT KNEE: Primary | ICD-10-CM

## 2021-02-22 DIAGNOSIS — H35.3130 BILATERAL NONEXUDATIVE AGE-RELATED MACULAR DEGENERATION, UNSPECIFIED STAGE: ICD-10-CM

## 2021-02-22 PROCEDURE — 99213 OFFICE O/P EST LOW 20 MIN: CPT | Performed by: PHYSICIAN ASSISTANT

## 2021-02-22 RX ORDER — DIPHENHYDRAMINE HCL 25 MG
25 TABLET ORAL EVERY 6 HOURS PRN
COMMUNITY
End: 2022-05-18

## 2021-02-22 ASSESSMENT — ENCOUNTER SYMPTOMS
WHEEZING: 0
BLOOD IN STOOL: 0
CHEST TIGHTNESS: 0
SHORTNESS OF BREATH: 0
PHOTOPHOBIA: 0
DIARRHEA: 0
COLOR CHANGE: 0
RHINORRHEA: 0
SORE THROAT: 0
ABDOMINAL PAIN: 0
EYE DISCHARGE: 0
COUGH: 0
EYE REDNESS: 0
VOMITING: 0
EYE PAIN: 0
CONSTIPATION: 0
NAUSEA: 0
BACK PAIN: 0

## 2021-02-22 ASSESSMENT — PATIENT HEALTH QUESTIONNAIRE - PHQ9
SUM OF ALL RESPONSES TO PHQ QUESTIONS 1-9: 2
SUM OF ALL RESPONSES TO PHQ QUESTIONS 1-9: 2

## 2021-03-09 ENCOUNTER — OFFICE VISIT (OUTPATIENT)
Dept: ORTHOPEDIC SURGERY | Age: 76
End: 2021-03-09
Payer: MEDICARE

## 2021-03-09 VITALS
HEIGHT: 66 IN | WEIGHT: 156 LBS | BODY MASS INDEX: 25.07 KG/M2 | RESPIRATION RATE: 16 BRPM | OXYGEN SATURATION: 94 % | HEART RATE: 74 BPM

## 2021-03-09 DIAGNOSIS — M17.11 PRIMARY OSTEOARTHRITIS OF RIGHT KNEE: Primary | ICD-10-CM

## 2021-03-09 PROBLEM — M25.561 RIGHT KNEE PAIN: Status: RESOLVED | Noted: 2021-01-05 | Resolved: 2021-03-09

## 2021-03-09 PROCEDURE — G8484 FLU IMMUNIZE NO ADMIN: HCPCS | Performed by: ORTHOPAEDIC SURGERY

## 2021-03-09 PROCEDURE — 99214 OFFICE O/P EST MOD 30 MIN: CPT | Performed by: ORTHOPAEDIC SURGERY

## 2021-03-09 PROCEDURE — 1036F TOBACCO NON-USER: CPT | Performed by: ORTHOPAEDIC SURGERY

## 2021-03-09 PROCEDURE — 1123F ACP DISCUSS/DSCN MKR DOCD: CPT | Performed by: ORTHOPAEDIC SURGERY

## 2021-03-09 PROCEDURE — G8427 DOCREV CUR MEDS BY ELIG CLIN: HCPCS | Performed by: ORTHOPAEDIC SURGERY

## 2021-03-09 PROCEDURE — G8417 CALC BMI ABV UP PARAM F/U: HCPCS | Performed by: ORTHOPAEDIC SURGERY

## 2021-03-09 PROCEDURE — 4040F PNEUMOC VAC/ADMIN/RCVD: CPT | Performed by: ORTHOPAEDIC SURGERY

## 2021-03-09 ASSESSMENT — ENCOUNTER SYMPTOMS
COLOR CHANGE: 0
EYE PAIN: 0
EYE REDNESS: 0
WHEEZING: 0
CHEST TIGHTNESS: 0
VOMITING: 0
SHORTNESS OF BREATH: 0
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
## 2021-03-09 NOTE — PROGRESS NOTES
Patient returns to the office for a consultation to discuss his chronic right knee pain. Hx of osteoarthritis within the right knee which he has treated with gel injection series of Orthovisc with completion of series on October 12, 2020, steroid injections, therapy, and previous knee arthroscopy which was performed by Dr. Inderjit Alfaro on 7/2/2020 with no relief for his chronic knee pain. Patient continues to have sharp, medial joint line knee pain with swelling, and the knee giving out on him with pain rated at a 8/10 today. He has been treating his symptoms conservatively with rest, elevation, OTC anti-inflammatories, and use of Voltaren gel which only provide temporary relief. Patient wishes to discuss treatment options other than knee replacement.

## 2021-03-09 NOTE — PATIENT INSTRUCTIONS
Surgery discussed  Consent signed  Follow up for surgery as scheduled  You may call John Wdasworth, our Surgery Scheduler with any further questions or concerns at [phone]

## 2021-03-15 ENCOUNTER — TELEPHONE (OUTPATIENT)
Dept: ORTHOPEDIC SURGERY | Age: 76
End: 2021-03-15

## 2021-03-15 NOTE — TELEPHONE ENCOUNTER
Called patient's insurance spoke with Eulogio Leyden, pending at this time. Cpt code: 44221 with dx code: M17.11.    Ref# [id number]

## 2021-03-15 NOTE — TELEPHONE ENCOUNTER
Rec'vd call from patient stating that he is scheduled for knee replacement and to have a spinal block for the procedure, however, patient wants to change the spinal block to an IV administered block. He is requesting a returned call at [phone] to discuss this.

## 2021-03-18 PROBLEM — Z01.10 EXAMINATION OF EARS AND HEARING: Status: RESOLVED | Noted: 2021-02-16 | Resolved: 2021-03-18

## 2021-03-24 DIAGNOSIS — M17.11 PRIMARY OSTEOARTHRITIS OF RIGHT KNEE: Primary | ICD-10-CM

## 2021-04-05 ENCOUNTER — HOSPITAL ENCOUNTER (OUTPATIENT)
Dept: CT IMAGING | Age: 76
Discharge: HOME | End: 2021-04-05
Payer: MEDICARE

## 2021-04-05 DIAGNOSIS — M17.11 PRIMARY OSTEOARTHRITIS OF RIGHT KNEE: ICD-10-CM

## 2021-04-05 PROCEDURE — 73700 CT LOWER EXTREMITY W/O DYE: CPT

## 2021-04-09 RX ORDER — SODIUM CHLORIDE, SODIUM LACTATE, POTASSIUM CHLORIDE, CALCIUM CHLORIDE 600; 310; 30; 20 MG/100ML; MG/100ML; MG/100ML; MG/100ML
INJECTION, SOLUTION INTRAVENOUS ONCE
Status: CANCELLED | OUTPATIENT
Start: 2021-04-19

## 2021-04-12 ENCOUNTER — HOSPITAL ENCOUNTER (OUTPATIENT)
Dept: LAB | Age: 76
Discharge: HOME | End: 2021-04-12
Payer: MEDICARE

## 2021-04-12 ENCOUNTER — HOSPITAL ENCOUNTER (OUTPATIENT)
Dept: GENERAL RADIOLOGY | Age: 76
Discharge: HOME | End: 2021-04-12
Payer: MEDICARE

## 2021-04-12 ENCOUNTER — HOSPITAL ENCOUNTER (OUTPATIENT)
Dept: PREADMISSION TESTING | Age: 76
Discharge: HOME | End: 2021-04-16
Payer: MEDICARE

## 2021-04-12 VITALS
RESPIRATION RATE: 16 BRPM | SYSTOLIC BLOOD PRESSURE: 138 MMHG | HEIGHT: 66 IN | HEART RATE: 71 BPM | WEIGHT: 156 LBS | OXYGEN SATURATION: 97 % | BODY MASS INDEX: 25.07 KG/M2 | DIASTOLIC BLOOD PRESSURE: 76 MMHG | TEMPERATURE: 96.8 F

## 2021-04-12 DIAGNOSIS — Z01.818 PREOPERATIVE TESTING: Primary | ICD-10-CM

## 2021-04-12 DIAGNOSIS — M17.11 PRIMARY OSTEOARTHRITIS OF RIGHT KNEE: ICD-10-CM

## 2021-04-12 LAB
ALBUMIN SERPL-MCNC: 4.5 GM/DL (ref 3.4–5)
ALP BLD-CCNC: 71 IU/L (ref 40–128)
ALT SERPL-CCNC: 11 U/L (ref 10–40)
ANION GAP SERPL CALCULATED.3IONS-SCNC: 9 MMOL/L (ref 4–16)
AST SERPL-CCNC: 17 IU/L (ref 15–37)
BACTERIA: NEGATIVE /HPF
BASOPHILS ABSOLUTE: 0.1 K/CU MM
BASOPHILS RELATIVE PERCENT: 0.8 % (ref 0–1)
BILIRUB SERPL-MCNC: 0.5 MG/DL (ref 0–1)
BILIRUBIN URINE: NEGATIVE MG/DL
BLOOD, URINE: NEGATIVE
BUN BLDV-MCNC: 15 MG/DL (ref 6–23)
CALCIUM SERPL-MCNC: 9.8 MG/DL (ref 8.3–10.6)
CHLORIDE BLD-SCNC: 101 MMOL/L (ref 99–110)
CLARITY: CLEAR
CO2: 27 MMOL/L (ref 21–32)
COLOR: YELLOW
CREAT SERPL-MCNC: 0.8 MG/DL (ref 0.9–1.3)
DIFFERENTIAL TYPE: ABNORMAL
EKG ATRIAL RATE: 64 BPM
EKG DIAGNOSIS: NORMAL
EKG P AXIS: 23 DEGREES
EKG P-R INTERVAL: 196 MS
EKG Q-T INTERVAL: 378 MS
EKG QRS DURATION: 98 MS
EKG QTC CALCULATION (BAZETT): 389 MS
EKG R AXIS: -38 DEGREES
EKG T AXIS: 40 DEGREES
EKG VENTRICULAR RATE: 64 BPM
EOSINOPHILS ABSOLUTE: 0.1 K/CU MM
EOSINOPHILS RELATIVE PERCENT: 1 % (ref 0–3)
ERYTHROCYTE SEDIMENTATION RATE: 2 MM/HR (ref 0–20)
GFR AFRICAN AMERICAN: >60 ML/MIN/1.73M2
GFR NON-AFRICAN AMERICAN: >60 ML/MIN/1.73M2
GLUCOSE BLD-MCNC: 79 MG/DL (ref 70–99)
GLUCOSE, URINE: NEGATIVE MG/DL
HCT VFR BLD CALC: 48.6 % (ref 42–52)
HEMOGLOBIN: 16.3 GM/DL (ref 13.5–18)
IMMATURE NEUTROPHIL %: 0.5 % (ref 0–0.43)
KETONES, URINE: NEGATIVE MG/DL
LEUKOCYTE ESTERASE, URINE: NEGATIVE
LYMPHOCYTES ABSOLUTE: 1.5 K/CU MM
LYMPHOCYTES RELATIVE PERCENT: 16.7 % (ref 24–44)
MCH RBC QN AUTO: 32.6 PG (ref 27–31)
MCHC RBC AUTO-ENTMCNC: 33.5 % (ref 32–36)
MCV RBC AUTO: 97.2 FL (ref 78–100)
MONOCYTES ABSOLUTE: 1.1 K/CU MM
MONOCYTES RELATIVE PERCENT: 11.5 % (ref 0–4)
MUCUS: ABNORMAL HPF
NITRITE URINE, QUANTITATIVE: NEGATIVE
NUCLEATED RBC %: 0 %
PDW BLD-RTO: 13.2 % (ref 11.7–14.9)
PH, URINE: 6 (ref 5–8)
PLATELET # BLD: 283 K/CU MM (ref 140–440)
PMV BLD AUTO: 10.8 FL (ref 7.5–11.1)
POTASSIUM SERPL-SCNC: 4.5 MMOL/L (ref 3.5–5.1)
PROTEIN UA: NEGATIVE MG/DL
RBC # BLD: 5 M/CU MM (ref 4.6–6.2)
RBC URINE: <1 /HPF (ref 0–3)
SARS-COV-2: NOT DETECTED
SEGMENTED NEUTROPHILS ABSOLUTE COUNT: 6.3 K/CU MM
SEGMENTED NEUTROPHILS RELATIVE PERCENT: 69.5 % (ref 36–66)
SODIUM BLD-SCNC: 137 MMOL/L (ref 135–145)
SOURCE: NORMAL
SPECIFIC GRAVITY UA: 1.02 (ref 1–1.03)
TOTAL IMMATURE NEUTOROPHIL: 0.05 K/CU MM
TOTAL NUCLEATED RBC: 0 K/CU MM
TOTAL PROTEIN: 6.7 GM/DL (ref 6.4–8.2)
TRICHOMONAS: ABNORMAL /HPF
UROBILINOGEN, URINE: NEGATIVE MG/DL (ref 0.2–1)
WBC # BLD: 9.1 K/CU MM (ref 4–10.5)
WBC UA: <1 /HPF (ref 0–2)

## 2021-04-12 PROCEDURE — C9803 HOPD COVID-19 SPEC COLLECT: HCPCS

## 2021-04-12 PROCEDURE — 93005 ELECTROCARDIOGRAM TRACING: CPT | Performed by: ORTHOPAEDIC SURGERY

## 2021-04-12 PROCEDURE — 71046 X-RAY EXAM CHEST 2 VIEWS: CPT

## 2021-04-12 PROCEDURE — 85652 RBC SED RATE AUTOMATED: CPT

## 2021-04-12 PROCEDURE — U0003 INFECTIOUS AGENT DETECTION BY NUCLEIC ACID (DNA OR RNA); SEVERE ACUTE RESPIRATORY SYNDROME CORONAVIRUS 2 (SARS-COV-2) (CORONAVIRUS DISEASE [COVID-19]), AMPLIFIED PROBE TECHNIQUE, MAKING USE OF HIGH THROUGHPUT TECHNOLOGIES AS DESCRIBED BY CMS-2020-01-R: HCPCS

## 2021-04-12 PROCEDURE — 36415 COLL VENOUS BLD VENIPUNCTURE: CPT

## 2021-04-12 PROCEDURE — 85025 COMPLETE CBC W/AUTO DIFF WBC: CPT

## 2021-04-12 PROCEDURE — 87086 URINE CULTURE/COLONY COUNT: CPT

## 2021-04-12 PROCEDURE — 81001 URINALYSIS AUTO W/SCOPE: CPT

## 2021-04-12 PROCEDURE — U0005 INFEC AGEN DETEC AMPLI PROBE: HCPCS

## 2021-04-12 PROCEDURE — 93010 ELECTROCARDIOGRAM REPORT: CPT | Performed by: INTERNAL MEDICINE

## 2021-04-12 PROCEDURE — 80053 COMPREHEN METABOLIC PANEL: CPT

## 2021-04-13 ENCOUNTER — HOSPITAL ENCOUNTER (OUTPATIENT)
Dept: PHYSICAL THERAPY | Age: 76
Setting detail: THERAPIES SERIES
Discharge: HOME | End: 2021-04-13
Payer: MEDICARE

## 2021-04-13 LAB
CULTURE: NORMAL
Lab: NORMAL
SPECIMEN: NORMAL

## 2021-04-13 PROCEDURE — 97162 PT EVAL MOD COMPLEX 30 MIN: CPT

## 2021-04-13 PROCEDURE — 97110 THERAPEUTIC EXERCISES: CPT

## 2021-04-13 ASSESSMENT — PAIN DESCRIPTION - PAIN TYPE: TYPE: CHRONIC PAIN

## 2021-04-13 ASSESSMENT — PAIN DESCRIPTION - FREQUENCY: FREQUENCY: INTERMITTENT

## 2021-04-13 ASSESSMENT — PAIN - FUNCTIONAL ASSESSMENT: PAIN_FUNCTIONAL_ASSESSMENT: PREVENTS OR INTERFERES WITH ALL ACTIVE AND SOME PASSIVE ACTIVITIES

## 2021-04-13 ASSESSMENT — PAIN DESCRIPTION - PROGRESSION: CLINICAL_PROGRESSION: GRADUALLY WORSENING

## 2021-04-13 ASSESSMENT — PAIN DESCRIPTION - LOCATION: LOCATION: KNEE

## 2021-04-13 ASSESSMENT — PAIN SCALES - GENERAL: PAINLEVEL_OUTOF10: 7

## 2021-04-13 ASSESSMENT — PAIN DESCRIPTION - DESCRIPTORS: DESCRIPTORS: ACHING

## 2021-04-13 NOTE — PLAN OF CARE
Outpatient Physical Therapy           [place]           [] Phone: [phone]   Fax: [phone]  [place] [facility]           [x] Phone: [phone]   Fax: [phone]     To: Referring Practitioner: Taty Abbott    From: Javier Melendez PT     Patient: Klarissa Acevedo Sr.       : 1945  Diagnosis: Diagnosis: R knee OA   Treatment Diagnosis: Treatment Diagnosis: R knee pain/impaired mobility   Date: 2021    Physical Therapy Certification/Re-Certification Form  The following patient has been evaluated for physical therapy services and for therapy to continue, insurance requires physician review of the treatment plan initially and every 90 days. Please review the attached evaluation and/or summary of the patient's plan of care, and verify that you agree therapy should continue by signing the attached document and sending it back to our office.     ASSESSMENT  Patient primary complaints: R knee OA  History of condition:to have R TKA 21, no prior arthoplasties- has had knee arthroscopy  Current functional limitations: ambulating with cane; function impacted by knee pain  Clinical findings: LEFS 23/80  PLOF:lives alone  Skilled PT interventions are intended to: prepare patient for TKA   Patient agrees with established plan of care and assisted in the development of their  goals  Barriers to learning:none- no mental/cognitive barriers observed  Preferred learning style(s):   written- demonstration -practice  Preferred Language: English  Potential barriers to progress:none  The patient appears motivated to participate in PT : yes  Plan of Care/Treatment to date:  [x] Therapeutic Exercise  [] Modalities:  [] Therapeutic Activity     [] Ultrasound  [] Electrical Stimulation  [x] Gait Training      [] Cervical Traction [] Lumbar Traction  [] Neuromuscular Re-education    [] Cold/hotpack [] Iontophoresis   [x] Instruction in HEP      [] Vasopneumatic    [] Dry Needling  [] Manual Therapy               [] Aquatic

## 2021-04-13 NOTE — PROGRESS NOTES
Physical Therapy  Initial Assessment  Date: 2021  Patient Name: Macario Peña. MRN: [id number]  : 1945     Treatment Diagnosis: R knee pain/impaired mobility    Restrictions  Position Activity Restriction  Other position/activity restrictions: none    Subjective   General  Chart Reviewed: Yes  Patient assessed for rehabilitation services?: Yes  Additional Pertinent Hx: L knee surgery 10/10/17  Family / Caregiver Present: No  Referring Practitioner: Edmund Roan  Diagnosis: R knee OA  Follows Commands: Within Functional Limits  PT Visit Information  PT Insurance Information: Medicare advantage  Subjective  Subjective: to have R TKA 21, no prior arthoplasties  Pain Assessment  Pain Assessment: 0-10  Pain Level:  7(with walking)  Pain Type: Chronic pain  Pain Location: Knee  Pain Orientation: Right  Pain Descriptors: Aching  Pain Frequency: Intermittent  Clinical Progression: Gradually worsening  Functional Pain Assessment: Prevents or interferes with all active and some passive activities    Vision/Hearing  Hearing  Hearing Exceptions: Hard of hearing/hearing concerns    Orientation  Orientation  Overall Orientation Status: Within Normal Limits    Social/Functional History  Social/Functional History  Lives With: Alone  Type of Home: House  Home Layout: One level  Home Access: Level entry  Bathroom Shower/Tub: Tub/Shower unit  Bathroom Toilet: Standard  Receives Help From: Friend(s)  ADL Assistance: Independent  Homemaking Assistance: Independent  Homemaking Responsibilities: Yes  Meal Prep Responsibility: Primary  Laundry Responsibility: Primary  Cleaning Responsibility: Primary  Shopping Responsibility: Primary  Ambulation Assistance: Independent  Transfer Assistance: Independent  Active : Yes  Mode of Transportation: Car  Occupation: Retired    Objective     Observation/Palpation  Posture: Good  Palpation: R medial condyle TTP    AROM RLE (degrees)  R Knee Flexion 0-145: 125* to -108    Strength RLE  R Knee Extension: At least;4-/5                                                   Assessment   Conditions Requiring Skilled Therapeutic Intervention  Body structures, Functions, Activity limitations: Increased pain;Decreased functional mobility ; Decreased high-level IADLs  Assessment: LEFS 23/80  Treatment Diagnosis: R knee pain/impaired mobility  Prognosis: Good  Decision Making: Medium Complexity  History: PF- R knee OA  Exam: R knee ROM/ LEFS  Clinical Presentation: changing characterstics of function due to stifness/pain  Barriers to Learning: none  REQUIRES PT FOLLOW UP: Yes         Plan        G-Code       OutComes Score                                                  AM-PAC Score             Goals          Therapy Time   Individual Concurrent Group Co-treatment   Time In 1000         Time Out 1035         Minutes           Timed Code Treatment Minutes: 15 Minutes       YONG Rashid, PT

## 2021-04-16 ENCOUNTER — ANESTHESIA EVENT (OUTPATIENT)
Dept: OPERATING ROOM | Age: 76
End: 2021-04-16
Payer: MEDICARE

## 2021-04-16 ENCOUNTER — TELEPHONE (OUTPATIENT)
Dept: ORTHOPEDIC SURGERY | Age: 76
End: 2021-04-16

## 2021-04-16 NOTE — TELEPHONE ENCOUNTER
Patient called into the office requesting not to have an epidural in his spine for surgery. Pt would like to have an IV instead.    Surgery Monday 4/19/21 R-TKA

## 2021-04-16 NOTE — TELEPHONE ENCOUNTER
Thanks for the update and will make sure the of the anesthesia department understands and is in surgery.

## 2021-04-16 NOTE — ANESTHESIA PRE PROCEDURE
Department of Anesthesiology  Preprocedure Note       Name:  Catarino Naranjo.   Age:  66 y.o.  :  1945                                          MRN:  [id number]         Date:  2021      Surgeon: Sky Mason):  Nabor Pina MD    Procedure: Procedure(s):  RIGHT KNEE TOTAL ARTHROPLASTY ROBOTIC    Medications prior to admission:   Prior to Admission medications    Medication Sig Start Date End Date Taking? Authorizing Provider   diphenhydrAMINE (ALLERGY) 25 MG tablet Take 25 mg by mouth every 6 hours as needed for Itching    Historical Provider, MD       Current medications:    No current facility-administered medications for this encounter. Current Outpatient Medications   Medication Sig Dispense Refill    diphenhydrAMINE (ALLERGY) 25 MG tablet Take 25 mg by mouth every 6 hours as needed for Itching         Allergies:     Allergies   Allergen Reactions    Other      \"Allergic To Hay, Grass, Perfume, Candle Scents, Crop Dust Causing Problems Breathing, Swallowing, Hoarseness Of Voice\"    Percocet [Oxycodone-Acetaminophen] Itching     \"Feels Like Bugs Crawling On Me\"       Problem List:    Patient Active Problem List   Diagnosis Code    Hearing loss H91.90    ASCVD (arteriosclerotic cardiovascular disease) I25.10    Primary osteoarthritis of right knee M17.11    BPH (benign prostatic hyperplasia) N40.0    Allergic rhinitis J30.9    Bursitis M71.9    Acquired varus deformity knee, right M21.161    Bilateral nonexudative age-related macular degeneration H35.3132       Past Medical History:        Diagnosis Date    Acid reflux     Arthritis     \"Hands, Hips, Back, Tailbone, Ankles, Most Of My Joints\"    Asthma     No Pulmonologist At This Time    Chronic back pain     Full dentures     Hiatal hernia     History of blood transfusion 1977    No Reaction To Blood Transfusion Received    [place] (hard of hearing)     Bilateral Hearing Aids    Macular degeneration     Bilateral Eyes    Rhythm: regular             Beta Blocker:  Not on Beta Blocker         Neuro/Psych:   Negative Neuro/Psych ROS              GI/Hepatic/Renal:   (+) hiatal hernia, GERD:,           Endo/Other: Negative Endo/Other ROS                     ROS comment: DJD Abdominal:           Vascular: negative vascular ROS. Anesthesia Plan      regional and general     ASA 3     (PT REFUSES SAB)  Induction: intravenous. MIPS: Postoperative opioids intended. Anesthetic plan and risks discussed with patient. Plan discussed with CRNA. Attending anesthesiologist reviewed and agrees with Pre Eval content          JULIA Barnard - CRNA   4/16/2021       Pre Anesthesia Assessment complete.  Chart reviewed on 4/16/2021

## 2021-04-16 NOTE — PROGRESS NOTES
The patient contacted the office and has requested that he have a general anesthetic and not undergo a spinal on the day of surgery.

## 2021-04-19 ENCOUNTER — ANESTHESIA (OUTPATIENT)
Dept: OPERATING ROOM | Age: 76
End: 2021-04-19
Payer: MEDICARE

## 2021-04-19 ENCOUNTER — APPOINTMENT (OUTPATIENT)
Dept: GENERAL RADIOLOGY | Age: 76
End: 2021-04-19
Attending: ORTHOPAEDIC SURGERY
Payer: MEDICARE

## 2021-04-19 ENCOUNTER — HOSPITAL ENCOUNTER (OUTPATIENT)
Age: 76
Discharge: HOME HEALTH | End: 2021-04-20
Attending: ORTHOPAEDIC SURGERY | Admitting: ORTHOPAEDIC SURGERY
Payer: MEDICARE

## 2021-04-19 VITALS
DIASTOLIC BLOOD PRESSURE: 83 MMHG | TEMPERATURE: 97.4 F | SYSTOLIC BLOOD PRESSURE: 166 MMHG | OXYGEN SATURATION: 98 % | RESPIRATION RATE: 8 BRPM

## 2021-04-19 DIAGNOSIS — Z96.651 STATUS POST RIGHT KNEE REPLACEMENT: Primary | ICD-10-CM

## 2021-04-19 PROCEDURE — 2709999900 HC NON-CHARGEABLE SUPPLY: Performed by: ORTHOPAEDIC SURGERY

## 2021-04-19 PROCEDURE — 97163 PT EVAL HIGH COMPLEX 45 MIN: CPT

## 2021-04-19 PROCEDURE — 6370000000 HC RX 637 (ALT 250 FOR IP): Performed by: INTERNAL MEDICINE

## 2021-04-19 PROCEDURE — 6370000000 HC RX 637 (ALT 250 FOR IP): Performed by: ORTHOPAEDIC SURGERY

## 2021-04-19 PROCEDURE — 7100000000 HC PACU RECOVERY - FIRST 15 MIN: Performed by: ORTHOPAEDIC SURGERY

## 2021-04-19 PROCEDURE — 2500000003 HC RX 250 WO HCPCS: Performed by: ORTHOPAEDIC SURGERY

## 2021-04-19 PROCEDURE — 3600000019 HC SURGERY ROBOT ADDTL 15MIN: Performed by: ORTHOPAEDIC SURGERY

## 2021-04-19 PROCEDURE — 20985 CPTR-ASST DIR MS PX: CPT | Performed by: ORTHOPAEDIC SURGERY

## 2021-04-19 PROCEDURE — 27447 TOTAL KNEE ARTHROPLASTY: CPT | Performed by: PHYSICIAN ASSISTANT

## 2021-04-19 PROCEDURE — 2580000003 HC RX 258: Performed by: NURSE ANESTHETIST, CERTIFIED REGISTERED

## 2021-04-19 PROCEDURE — 3700000000 HC ANESTHESIA ATTENDED CARE: Performed by: ORTHOPAEDIC SURGERY

## 2021-04-19 PROCEDURE — 2580000003 HC RX 258: Performed by: ORTHOPAEDIC SURGERY

## 2021-04-19 PROCEDURE — 6360000002 HC RX W HCPCS: Performed by: ANESTHESIOLOGY

## 2021-04-19 PROCEDURE — S2900 ROBOTIC SURGICAL SYSTEM: HCPCS | Performed by: ORTHOPAEDIC SURGERY

## 2021-04-19 PROCEDURE — 2500000003 HC RX 250 WO HCPCS: Performed by: NURSE ANESTHETIST, CERTIFIED REGISTERED

## 2021-04-19 PROCEDURE — 27447 TOTAL KNEE ARTHROPLASTY: CPT | Performed by: ORTHOPAEDIC SURGERY

## 2021-04-19 PROCEDURE — 6360000002 HC RX W HCPCS: Performed by: ORTHOPAEDIC SURGERY

## 2021-04-19 PROCEDURE — C1776 JOINT DEVICE (IMPLANTABLE): HCPCS | Performed by: ORTHOPAEDIC SURGERY

## 2021-04-19 PROCEDURE — 7100000010 HC PHASE II RECOVERY - FIRST 15 MIN: Performed by: ORTHOPAEDIC SURGERY

## 2021-04-19 PROCEDURE — 6360000002 HC RX W HCPCS: Performed by: NURSE ANESTHETIST, CERTIFIED REGISTERED

## 2021-04-19 PROCEDURE — 2720000010 HC SURG SUPPLY STERILE: Performed by: ORTHOPAEDIC SURGERY

## 2021-04-19 PROCEDURE — 76942 ECHO GUIDE FOR BIOPSY: CPT | Performed by: NURSE ANESTHETIST, CERTIFIED REGISTERED

## 2021-04-19 PROCEDURE — 7100000001 HC PACU RECOVERY - ADDTL 15 MIN: Performed by: ORTHOPAEDIC SURGERY

## 2021-04-19 PROCEDURE — 94150 VITAL CAPACITY TEST: CPT

## 2021-04-19 PROCEDURE — 97116 GAIT TRAINING THERAPY: CPT

## 2021-04-19 PROCEDURE — 94640 AIRWAY INHALATION TREATMENT: CPT

## 2021-04-19 PROCEDURE — 64447 NJX AA&/STRD FEMORAL NRV IMG: CPT | Performed by: ANESTHESIOLOGY

## 2021-04-19 PROCEDURE — 3700000001 HC ADD 15 MINUTES (ANESTHESIA): Performed by: ORTHOPAEDIC SURGERY

## 2021-04-19 PROCEDURE — 97530 THERAPEUTIC ACTIVITIES: CPT

## 2021-04-19 PROCEDURE — 3600000009 HC SURGERY ROBOT BASE: Performed by: ORTHOPAEDIC SURGERY

## 2021-04-19 PROCEDURE — 94761 N-INVAS EAR/PLS OXIMETRY MLT: CPT

## 2021-04-19 PROCEDURE — 7100000011 HC PHASE II RECOVERY - ADDTL 15 MIN: Performed by: ORTHOPAEDIC SURGERY

## 2021-04-19 PROCEDURE — 94664 DEMO&/EVAL PT USE INHALER: CPT

## 2021-04-19 PROCEDURE — 73560 X-RAY EXAM OF KNEE 1 OR 2: CPT

## 2021-04-19 PROCEDURE — C1713 ANCHOR/SCREW BN/BN,TIS/BN: HCPCS | Performed by: ORTHOPAEDIC SURGERY

## 2021-04-19 DEVICE — CEMENT BNE 40GM W/ GENT HI VISC RADPQ FOR REV SURG: Type: IMPLANTABLE DEVICE | Site: KNEE | Status: FUNCTIONAL

## 2021-04-19 DEVICE — PIN BNE FIX L110MM DIA32MM: Type: IMPLANTABLE DEVICE | Status: FUNCTIONAL

## 2021-04-19 RX ORDER — PANTOPRAZOLE SODIUM 40 MG/1
40 TABLET, DELAYED RELEASE ORAL
Status: DISCONTINUED | OUTPATIENT
Start: 2021-04-20 | End: 2021-04-20 | Stop reason: HOSPADM

## 2021-04-19 RX ORDER — SODIUM CHLORIDE, SODIUM LACTATE, POTASSIUM CHLORIDE, CALCIUM CHLORIDE 600; 310; 30; 20 MG/100ML; MG/100ML; MG/100ML; MG/100ML
INJECTION, SOLUTION INTRAVENOUS CONTINUOUS
Status: DISCONTINUED | OUTPATIENT
Start: 2021-04-19 | End: 2021-04-20 | Stop reason: HOSPADM

## 2021-04-19 RX ORDER — FENTANYL CITRATE 50 UG/ML
25 INJECTION, SOLUTION INTRAMUSCULAR; INTRAVENOUS EVERY 5 MIN PRN
Status: DISCONTINUED | OUTPATIENT
Start: 2021-04-19 | End: 2021-04-19 | Stop reason: HOSPADM

## 2021-04-19 RX ORDER — PROPOFOL 10 MG/ML
INJECTION, EMULSION INTRAVENOUS PRN
Status: DISCONTINUED | OUTPATIENT
Start: 2021-04-19 | End: 2021-04-19 | Stop reason: SDUPTHER

## 2021-04-19 RX ORDER — FENTANYL CITRATE 50 UG/ML
50 INJECTION, SOLUTION INTRAMUSCULAR; INTRAVENOUS EVERY 5 MIN PRN
Status: DISCONTINUED | OUTPATIENT
Start: 2021-04-19 | End: 2021-04-19 | Stop reason: HOSPADM

## 2021-04-19 RX ORDER — ONDANSETRON 2 MG/ML
4 INJECTION INTRAMUSCULAR; INTRAVENOUS
Status: DISCONTINUED | OUTPATIENT
Start: 2021-04-19 | End: 2021-04-19 | Stop reason: HOSPADM

## 2021-04-19 RX ORDER — SODIUM CHLORIDE, SODIUM LACTATE, POTASSIUM CHLORIDE, CALCIUM CHLORIDE 600; 310; 30; 20 MG/100ML; MG/100ML; MG/100ML; MG/100ML
INJECTION, SOLUTION INTRAVENOUS CONTINUOUS PRN
Status: DISCONTINUED | OUTPATIENT
Start: 2021-04-19 | End: 2021-04-19 | Stop reason: SDUPTHER

## 2021-04-19 RX ORDER — TRANEXAMIC ACID 100 MG/ML
1000 INJECTION, SOLUTION INTRAVENOUS
Status: COMPLETED | OUTPATIENT
Start: 2021-04-19 | End: 2021-04-19

## 2021-04-19 RX ORDER — SENNA PLUS 8.6 MG/1
1 TABLET ORAL DAILY PRN
Status: DISCONTINUED | OUTPATIENT
Start: 2021-04-19 | End: 2021-04-20 | Stop reason: HOSPADM

## 2021-04-19 RX ORDER — CALCIUM CARBONATE 200(500)MG
500 TABLET,CHEWABLE ORAL 3 TIMES DAILY PRN
Status: DISCONTINUED | OUTPATIENT
Start: 2021-04-19 | End: 2021-04-20 | Stop reason: HOSPADM

## 2021-04-19 RX ORDER — HYDROCODONE BITARTRATE AND ACETAMINOPHEN 5; 325 MG/1; MG/1
1 TABLET ORAL EVERY 4 HOURS PRN
Qty: 28 TABLET | Refills: 0 | Status: SHIPPED | OUTPATIENT
Start: 2021-04-19 | End: 2021-04-26

## 2021-04-19 RX ORDER — ONDANSETRON 4 MG/1
4 TABLET, ORALLY DISINTEGRATING ORAL EVERY 8 HOURS PRN
Status: DISCONTINUED | OUTPATIENT
Start: 2021-04-19 | End: 2021-04-20 | Stop reason: HOSPADM

## 2021-04-19 RX ORDER — KETOROLAC TROMETHAMINE 30 MG/ML
INJECTION, SOLUTION INTRAMUSCULAR; INTRAVENOUS PRN
Status: DISCONTINUED | OUTPATIENT
Start: 2021-04-19 | End: 2021-04-19 | Stop reason: SDUPTHER

## 2021-04-19 RX ORDER — ROPIVACAINE HYDROCHLORIDE 5 MG/ML
INJECTION, SOLUTION EPIDURAL; INFILTRATION; PERINEURAL
Status: COMPLETED | OUTPATIENT
Start: 2021-04-19 | End: 2021-04-19

## 2021-04-19 RX ORDER — SODIUM CHLORIDE 9 MG/ML
25 INJECTION, SOLUTION INTRAVENOUS PRN
Status: DISCONTINUED | OUTPATIENT
Start: 2021-04-19 | End: 2021-04-20 | Stop reason: HOSPADM

## 2021-04-19 RX ORDER — HYDRALAZINE HYDROCHLORIDE 20 MG/ML
5 INJECTION INTRAMUSCULAR; INTRAVENOUS EVERY 10 MIN PRN
Status: DISCONTINUED | OUTPATIENT
Start: 2021-04-19 | End: 2021-04-19 | Stop reason: HOSPADM

## 2021-04-19 RX ORDER — ONDANSETRON 2 MG/ML
INJECTION INTRAMUSCULAR; INTRAVENOUS PRN
Status: DISCONTINUED | OUTPATIENT
Start: 2021-04-19 | End: 2021-04-19 | Stop reason: SDUPTHER

## 2021-04-19 RX ORDER — ROCURONIUM BROMIDE 10 MG/ML
INJECTION, SOLUTION INTRAVENOUS PRN
Status: DISCONTINUED | OUTPATIENT
Start: 2021-04-19 | End: 2021-04-19 | Stop reason: SDUPTHER

## 2021-04-19 RX ORDER — FENTANYL CITRATE 50 UG/ML
INJECTION, SOLUTION INTRAMUSCULAR; INTRAVENOUS PRN
Status: DISCONTINUED | OUTPATIENT
Start: 2021-04-19 | End: 2021-04-19 | Stop reason: SDUPTHER

## 2021-04-19 RX ORDER — DIPHENHYDRAMINE HCL 25 MG
25 TABLET ORAL EVERY 6 HOURS PRN
Status: DISCONTINUED | OUTPATIENT
Start: 2021-04-19 | End: 2021-04-20 | Stop reason: HOSPADM

## 2021-04-19 RX ORDER — SODIUM CHLORIDE, SODIUM LACTATE, POTASSIUM CHLORIDE, CALCIUM CHLORIDE 600; 310; 30; 20 MG/100ML; MG/100ML; MG/100ML; MG/100ML
INJECTION, SOLUTION INTRAVENOUS ONCE
Status: COMPLETED | OUTPATIENT
Start: 2021-04-19 | End: 2021-04-19

## 2021-04-19 RX ORDER — LABETALOL HYDROCHLORIDE 5 MG/ML
5 INJECTION, SOLUTION INTRAVENOUS EVERY 10 MIN PRN
Status: DISCONTINUED | OUTPATIENT
Start: 2021-04-19 | End: 2021-04-19 | Stop reason: HOSPADM

## 2021-04-19 RX ORDER — HYDRALAZINE HYDROCHLORIDE 20 MG/ML
INJECTION INTRAMUSCULAR; INTRAVENOUS PRN
Status: DISCONTINUED | OUTPATIENT
Start: 2021-04-19 | End: 2021-04-19 | Stop reason: SDUPTHER

## 2021-04-19 RX ORDER — ASPIRIN 325 MG
325 TABLET, DELAYED RELEASE (ENTERIC COATED) ORAL 2 TIMES DAILY
Qty: 30 TABLET | Refills: 0 | Status: SHIPPED | OUTPATIENT
Start: 2021-04-19 | End: 2022-01-04

## 2021-04-19 RX ORDER — ACETAMINOPHEN 325 MG/1
650 TABLET ORAL EVERY 6 HOURS
Status: DISCONTINUED | OUTPATIENT
Start: 2021-04-19 | End: 2021-04-20 | Stop reason: HOSPADM

## 2021-04-19 RX ORDER — SODIUM CHLORIDE 0.9 % (FLUSH) 0.9 %
10 SYRINGE (ML) INJECTION PRN
Status: DISCONTINUED | OUTPATIENT
Start: 2021-04-19 | End: 2021-04-20 | Stop reason: HOSPADM

## 2021-04-19 RX ORDER — ALBUTEROL SULFATE 90 UG/1
2 AEROSOL, METERED RESPIRATORY (INHALATION) 4 TIMES DAILY
Status: DISCONTINUED | OUTPATIENT
Start: 2021-04-19 | End: 2021-04-20

## 2021-04-19 RX ORDER — HYDROCODONE BITARTRATE AND ACETAMINOPHEN 5; 325 MG/1; MG/1
1 TABLET ORAL EVERY 4 HOURS PRN
Status: DISCONTINUED | OUTPATIENT
Start: 2021-04-19 | End: 2021-04-20 | Stop reason: HOSPADM

## 2021-04-19 RX ORDER — ONDANSETRON 2 MG/ML
4 INJECTION INTRAMUSCULAR; INTRAVENOUS EVERY 6 HOURS PRN
Status: DISCONTINUED | OUTPATIENT
Start: 2021-04-19 | End: 2021-04-20 | Stop reason: HOSPADM

## 2021-04-19 RX ORDER — DOCUSATE SODIUM 100 MG/1
100 CAPSULE, LIQUID FILLED ORAL DAILY PRN
Qty: 30 CAPSULE | Refills: 0 | Status: SHIPPED | OUTPATIENT
Start: 2021-04-19 | End: 2021-07-02

## 2021-04-19 RX ORDER — DEXAMETHASONE SODIUM PHOSPHATE 4 MG/ML
INJECTION, SOLUTION INTRA-ARTICULAR; INTRALESIONAL; INTRAMUSCULAR; INTRAVENOUS; SOFT TISSUE PRN
Status: DISCONTINUED | OUTPATIENT
Start: 2021-04-19 | End: 2021-04-19 | Stop reason: SDUPTHER

## 2021-04-19 RX ORDER — SODIUM CHLORIDE 0.9 % (FLUSH) 0.9 %
10 SYRINGE (ML) INJECTION EVERY 12 HOURS SCHEDULED
Status: DISCONTINUED | OUTPATIENT
Start: 2021-04-19 | End: 2021-04-20 | Stop reason: HOSPADM

## 2021-04-19 RX ADMIN — ROCURONIUM BROMIDE 10 MG: 10 INJECTION INTRAVENOUS at 08:58

## 2021-04-19 RX ADMIN — KETOROLAC TROMETHAMINE 20 MG: 30 INJECTION, SOLUTION INTRAMUSCULAR; INTRAVENOUS at 09:41

## 2021-04-19 RX ADMIN — Medication 2 PUFF: at 21:17

## 2021-04-19 RX ADMIN — CEFAZOLIN SODIUM 2000 MG: 10 INJECTION, POWDER, FOR SOLUTION INTRAVENOUS at 06:31

## 2021-04-19 RX ADMIN — SODIUM CHLORIDE, POTASSIUM CHLORIDE, SODIUM LACTATE AND CALCIUM CHLORIDE: 600; 310; 30; 20 INJECTION, SOLUTION INTRAVENOUS at 07:33

## 2021-04-19 RX ADMIN — CALCIUM CARBONATE (ANTACID) CHEW TAB 500 MG 500 MG: 500 CHEW TAB at 22:04

## 2021-04-19 RX ADMIN — CALCIUM CARBONATE (ANTACID) CHEW TAB 500 MG 500 MG: 500 CHEW TAB at 16:35

## 2021-04-19 RX ADMIN — HYDROMORPHONE HYDROCHLORIDE 0.25 MG: 1 INJECTION, SOLUTION INTRAMUSCULAR; INTRAVENOUS; SUBCUTANEOUS at 16:05

## 2021-04-19 RX ADMIN — FENTANYL CITRATE 25 MCG: 50 INJECTION, SOLUTION INTRAMUSCULAR; INTRAVENOUS at 07:20

## 2021-04-19 RX ADMIN — ROPIVACAINE HYDROCHLORIDE 20 ML: 5 INJECTION, SOLUTION EPIDURAL; INFILTRATION; PERINEURAL at 07:27

## 2021-04-19 RX ADMIN — ALBUTEROL SULFATE 2 PUFF: 90 AEROSOL, METERED RESPIRATORY (INHALATION) at 21:15

## 2021-04-19 RX ADMIN — FENTANYL CITRATE 25 MCG: 50 INJECTION INTRAMUSCULAR; INTRAVENOUS at 10:47

## 2021-04-19 RX ADMIN — CEFAZOLIN 1000 MG: 1 INJECTION, POWDER, FOR SOLUTION INTRAMUSCULAR; INTRAVENOUS; PARENTERAL at 08:19

## 2021-04-19 RX ADMIN — ROCURONIUM BROMIDE 50 MG: 10 INJECTION INTRAVENOUS at 07:41

## 2021-04-19 RX ADMIN — SODIUM CHLORIDE, POTASSIUM CHLORIDE, SODIUM LACTATE AND CALCIUM CHLORIDE: 600; 310; 30; 20 INJECTION, SOLUTION INTRAVENOUS at 16:06

## 2021-04-19 RX ADMIN — FENTANYL CITRATE 50 MCG: 50 INJECTION, SOLUTION INTRAMUSCULAR; INTRAVENOUS at 08:39

## 2021-04-19 RX ADMIN — ASPIRIN 325 MG: 325 TABLET, COATED ORAL at 16:06

## 2021-04-19 RX ADMIN — DEXAMETHASONE SODIUM PHOSPHATE 8 MG: 4 INJECTION, SOLUTION INTRAMUSCULAR; INTRAVENOUS at 08:08

## 2021-04-19 RX ADMIN — FENTANYL CITRATE 75 MCG: 50 INJECTION, SOLUTION INTRAMUSCULAR; INTRAVENOUS at 07:41

## 2021-04-19 RX ADMIN — HYDROCODONE BITARTRATE AND ACETAMINOPHEN 1 TABLET: 5; 325 TABLET ORAL at 22:04

## 2021-04-19 RX ADMIN — ACETAMINOPHEN 650 MG: 325 TABLET ORAL at 22:04

## 2021-04-19 RX ADMIN — SODIUM CHLORIDE, PRESERVATIVE FREE 10 ML: 5 INJECTION INTRAVENOUS at 22:45

## 2021-04-19 RX ADMIN — SUGAMMADEX 200 MG: 100 INJECTION, SOLUTION INTRAVENOUS at 09:49

## 2021-04-19 RX ADMIN — FENTANYL CITRATE 50 MCG: 50 INJECTION, SOLUTION INTRAMUSCULAR; INTRAVENOUS at 08:13

## 2021-04-19 RX ADMIN — ONDANSETRON 4 MG: 2 INJECTION INTRAMUSCULAR; INTRAVENOUS at 09:28

## 2021-04-19 RX ADMIN — FENTANYL CITRATE 25 MCG: 50 INJECTION INTRAMUSCULAR; INTRAVENOUS at 10:35

## 2021-04-19 RX ADMIN — SODIUM CHLORIDE, POTASSIUM CHLORIDE, SODIUM LACTATE AND CALCIUM CHLORIDE: 600; 310; 30; 20 INJECTION, SOLUTION INTRAVENOUS at 06:39

## 2021-04-19 RX ADMIN — ACETAMINOPHEN 650 MG: 325 TABLET ORAL at 16:06

## 2021-04-19 RX ADMIN — HYDRALAZINE HYDROCHLORIDE 5 MG: 20 INJECTION, SOLUTION INTRAMUSCULAR; INTRAVENOUS at 10:02

## 2021-04-19 RX ADMIN — CEFAZOLIN SODIUM 2000 MG: 10 INJECTION, POWDER, FOR SOLUTION INTRAVENOUS at 14:47

## 2021-04-19 RX ADMIN — FENTANYL CITRATE 25 MCG: 50 INJECTION INTRAMUSCULAR; INTRAVENOUS at 10:26

## 2021-04-19 RX ADMIN — SODIUM CHLORIDE, POTASSIUM CHLORIDE, SODIUM LACTATE AND CALCIUM CHLORIDE: 600; 310; 30; 20 INJECTION, SOLUTION INTRAVENOUS at 08:19

## 2021-04-19 RX ADMIN — PROPOFOL 130 MG: 10 INJECTION, EMULSION INTRAVENOUS at 07:41

## 2021-04-19 RX ADMIN — CEFAZOLIN 1000 MG: 1 INJECTION, POWDER, FOR SOLUTION INTRAMUSCULAR; INTRAVENOUS; PARENTERAL at 07:50

## 2021-04-19 RX ADMIN — TRANEXAMIC ACID 1000 MG: 100 INJECTION, SOLUTION INTRAVENOUS at 07:50

## 2021-04-19 ASSESSMENT — PULMONARY FUNCTION TESTS
PIF_VALUE: 18
PIF_VALUE: 17
PIF_VALUE: 19
PIF_VALUE: 16
PIF_VALUE: 0
PIF_VALUE: 22
PIF_VALUE: 17
PIF_VALUE: 17
PIF_VALUE: 18
PIF_VALUE: 0
PIF_VALUE: 18
PIF_VALUE: 19
PIF_VALUE: 17
PIF_VALUE: 20
PIF_VALUE: 17
PIF_VALUE: 17
PIF_VALUE: 0
PIF_VALUE: 18
PIF_VALUE: 22
PIF_VALUE: 17
PIF_VALUE: 18
PIF_VALUE: 17
PIF_VALUE: 18
PIF_VALUE: 18
PIF_VALUE: 1
PIF_VALUE: 17
PIF_VALUE: 18
PIF_VALUE: 18
PIF_VALUE: 17
PIF_VALUE: 22
PIF_VALUE: 18
PIF_VALUE: 17
PIF_VALUE: 18
PIF_VALUE: 14
PIF_VALUE: 18
PIF_VALUE: 18
PIF_VALUE: 17
PIF_VALUE: 22
PIF_VALUE: 16
PIF_VALUE: 15
PIF_VALUE: 18
PIF_VALUE: 17
PIF_VALUE: 18
PIF_VALUE: 19
PIF_VALUE: 1
PIF_VALUE: 2
PIF_VALUE: 18
PIF_VALUE: 18
PIF_VALUE: 17
PIF_VALUE: 18
PIF_VALUE: 18
PIF_VALUE: 17
PIF_VALUE: 21
PIF_VALUE: 18
PIF_VALUE: 17
PIF_VALUE: 19
PIF_VALUE: 3
PIF_VALUE: 18
PIF_VALUE: 17
PIF_VALUE: 19
PIF_VALUE: 2
PIF_VALUE: 18
PIF_VALUE: 17
PIF_VALUE: 2
PIF_VALUE: 18
PIF_VALUE: 25
PIF_VALUE: 18
PIF_VALUE: 17
PIF_VALUE: 18
PIF_VALUE: 18
PIF_VALUE: 17
PIF_VALUE: 17
PIF_VALUE: 18
PIF_VALUE: 17
PIF_VALUE: 2

## 2021-04-19 ASSESSMENT — PAIN DESCRIPTION - FREQUENCY
FREQUENCY: CONTINUOUS
FREQUENCY: CONTINUOUS

## 2021-04-19 ASSESSMENT — PAIN DESCRIPTION - LOCATION
LOCATION: KNEE

## 2021-04-19 ASSESSMENT — PAIN SCALES - GENERAL
PAINLEVEL_OUTOF10: 6
PAINLEVEL_OUTOF10: 0
PAINLEVEL_OUTOF10: 5
PAINLEVEL_OUTOF10: 8
PAINLEVEL_OUTOF10: 6
PAINLEVEL_OUTOF10: 8

## 2021-04-19 ASSESSMENT — PAIN - FUNCTIONAL ASSESSMENT: PAIN_FUNCTIONAL_ASSESSMENT: 0-10

## 2021-04-19 ASSESSMENT — PAIN DESCRIPTION - ORIENTATION
ORIENTATION: RIGHT

## 2021-04-19 ASSESSMENT — PAIN DESCRIPTION - DESCRIPTORS
DESCRIPTORS: ACHING;DISCOMFORT
DESCRIPTORS: ACHING
DESCRIPTORS: ACHING

## 2021-04-19 ASSESSMENT — PAIN DESCRIPTION - PAIN TYPE
TYPE: SURGICAL PAIN
TYPE: SURGICAL PAIN

## 2021-04-19 NOTE — PROGRESS NOTES
1105 Received from PACU, report from [facility] at bedside. Denies nausea. Rates pain to right knee 8/10. Cooling machine to right knee. Denies other needs at this time. 1120 Teeth, hearing aids given to patient. Water given per request.  Jose People without difficulty. 1138 Family at bedside. Informed patient will not have any caretaker at home with him. Dr. Rene Grayson updated. He will stop over and see patient from surgery. 1145 Voided 250cc clear yesy urine in urinal.  1200 Pt up at side of bed, tolerated well. PT called to update that patient is ready for PT.    1208 Returned to bed. Pt concerned about going home. Wants to discuss with Dr. Rene Grayson. 1215 PT here for therapy. 1235 Report to Sammy. Transfer of care. 1310 Family at bedside. Dr. Rene Grayson in to see patient. Plan to stay overnight. 1320 Lunch ordered for patient. Pt resting quietly without distress. Family leaving to get hearing aid batteries. Call light in reach. [place] being cleaned for patient to be transferred. 1343 Pt resting quietly without distress. Denies needs at this time. Call light in reach. 1420 Dietary here with lunch tray. Repositioned in bed. Family at bedside. Call light in reach. 1435 Report given to Sammy. Transfer of care.   Fransico Graff

## 2021-04-19 NOTE — ANESTHESIA POSTPROCEDURE EVALUATION
Department of Anesthesiology  Postprocedure Note    Patient: Jyoti Mclean Sr. MRN: [id number]  YOB: 1945  Date of evaluation: 4/19/2021  Time:  10:13 AM     Procedure Summary     Date: 04/19/21 Room / Location: [address]    Anesthesia Start: 5580 Anesthesia Stop:     Procedure: RIGHT KNEE TOTAL ARTHROPLASTY ROBOTIC (Right Knee) Diagnosis: (PRIMARY OSTEOARTHRITIS OF RIGHT KNEE)    Surgeons: Kateryna Whalen MD Responsible Provider: Florence Richardson MD    Anesthesia Type: regional, general ASA Status: 3          Anesthesia Type: No value filed. Jluis Phase I:      Jluis Phase II:      Last vitals: Reviewed and per EMR flowsheets.        Anesthesia Post Evaluation    Patient location during evaluation: PACU  Patient participation: complete - patient participated  Level of consciousness: awake  Pain score: 4  Airway patency: patent  Nausea & Vomiting: no nausea and no vomiting  Complications: no  Cardiovascular status: blood pressure returned to baseline  Respiratory status: acceptable  Hydration status: euvolemic
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
## 2021-04-19 NOTE — OP NOTE
Operative Note      Patient: Zachariah Ridamanda. YOB: 1945  MRN: [id number]    Date of Procedure: 4/19/2021    Pre-Op Diagnosis: PRIMARY OSTEOARTHRITIS OF RIGHT KNEE    Post-Op Diagnosis: Same       Procedure(s):  RIGHT KNEE TOTAL ARTHROPLASTY ROBOTIC    Surgeon(s):  Sabina Donnelly MD    Assistant:     JORDAN Zepeda    Anesthesia: General with regional nerve block    Estimated Blood Loss (mL): less than 827     Complications: None    Specimens:   * No specimens in log *    Implants:  Implant Name Type Inv. Item Serial No.  Lot No. LRB No. Used Action   CEMENT BNE 40GM W/ GENT HI VISC RADPQ FOR REV SURG Cement CEMENT BNE 40GM W/ GENT HI VISC RADPQ FOR REV SURG  [employer]- [id number] Right 2 Implanted   PIN BNE FIX L110MM [id number]  PIN BNE FIX L110MM [id number]  [employer] [id number] Right 1 Implanted   IMPLANT PAT [id number] X3 ASYM TRIATHLON Knee IMPLANT PAT [id number] X3 ASYM TRIATHLON  [employer] [id number] Right 1 Implanted   BASEPLATE TIB SZ 5 KNEE TRITANIUM IRON JATINDER LO PROF TRIATHLON Knee BASEPLATE TIB SZ 5 KNEE TRITANIUM IRON JATINDER LO PROF TRIATHLON  [employer] [id number] Right 1 Implanted   COMPONENT FEM SZ 4 R ANT KNEE CRUCE RET IRON REFERENCING Knee COMPONENT FEM SZ 4 R ANT KNEE CRUCE RET IRON REFERENCING  [employer] Verasteminvi [id number] Right 1 Implanted   INSERT TIB BEAR SZ 5 THK9MM KNEE X3 CRUCE RET TRIATHLON Knee INSERT TIB BEAR SZ 5 THK9MM KNEE X3 CRUCE RET TRIATHLON  [employer] [id number] Right 1 Implanted         Drains: * No LDAs found *    Findings:     Detailed Description of Procedure:   Implants:  Guille triathlon cemented total knee replacement size 4  CR femoral component, size 5 tibial baseplate, size 9mm CR articular surface liner, and a size 32 asymmetric polyethylene patella    The patient was identified in the preoperative area and the site was marked.   The patient was taken back to the operating room guide.  A size 32 asymmetric patella trial component was inserted. The knee was again taken through range of motion and there was excellent patellofemoral tracking. The trial components were removed. The knee was thoroughly irrigated with the pulse lavage. Cement was mixed and applied to the proximal tibia and distal femur. The size 5 tibial component was impacted into place along with the size 4 femoral component, and excess cement was removed. The trial liner was inserted and the knee was brought into full extension while the cement hardened. The size 32 asymmetric polyethylene patella was cemented into place as well. The knee was trialed again and the size 9mm CR articular surface liner provided excellent stability and range of motion with improved alignment of the knee. The liner was then implanted. The knee was injected with 30 mL of half percent Marcaine with epinephrine and 30 mg of Toradol at the arthrotomy site and joint capsule. The tourniquet was deflated and bleeding was well controlled with the Bovie. The medial parapatellar arthrotomy was closed with a #1 strata fix suture. Deep subcutaneous layers were closed with buried 2-0 Vicryl. Skin was then closed with a running 3-0 strata fix subcuticular stitch followed by a Prineo Dermabond dressing. A sterile bandage was applied with 4 x 8's, ABD, sterile web roll, ice therapy pad and an Ace wrap. The patient was awakened and taken to PACU in stable condition. All sponge and needle counts were correct. he patient will be on chemical DVT prophylaxis and will be weightbearing as tolerated. Due to social issues the patient would like to be admitted to the hospital overnight for pain control and physical therapy and medical monitoring. He will likely be discharged home postoperative day #1.     Electronically signed by Nancy Chase MD on 4/19/2021 at 10:18 AM
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
## 2021-04-20 VITALS
DIASTOLIC BLOOD PRESSURE: 66 MMHG | SYSTOLIC BLOOD PRESSURE: 136 MMHG | TEMPERATURE: 97.6 F | RESPIRATION RATE: 16 BRPM | HEART RATE: 74 BPM | OXYGEN SATURATION: 96 %

## 2021-04-20 DIAGNOSIS — Z96.651 S/P TOTAL KNEE ARTHROPLASTY, RIGHT: Primary | ICD-10-CM

## 2021-04-20 PROCEDURE — 97116 GAIT TRAINING THERAPY: CPT

## 2021-04-20 PROCEDURE — 94761 N-INVAS EAR/PLS OXIMETRY MLT: CPT

## 2021-04-20 PROCEDURE — 6360000002 HC RX W HCPCS: Performed by: ORTHOPAEDIC SURGERY

## 2021-04-20 PROCEDURE — 6370000000 HC RX 637 (ALT 250 FOR IP): Performed by: ORTHOPAEDIC SURGERY

## 2021-04-20 PROCEDURE — 6370000000 HC RX 637 (ALT 250 FOR IP): Performed by: INTERNAL MEDICINE

## 2021-04-20 PROCEDURE — 94150 VITAL CAPACITY TEST: CPT

## 2021-04-20 PROCEDURE — 97110 THERAPEUTIC EXERCISES: CPT

## 2021-04-20 PROCEDURE — 2580000003 HC RX 258: Performed by: ORTHOPAEDIC SURGERY

## 2021-04-20 PROCEDURE — 97166 OT EVAL MOD COMPLEX 45 MIN: CPT

## 2021-04-20 PROCEDURE — 97530 THERAPEUTIC ACTIVITIES: CPT

## 2021-04-20 RX ORDER — ALBUTEROL SULFATE 90 UG/1
2 AEROSOL, METERED RESPIRATORY (INHALATION) EVERY 4 HOURS PRN
Status: DISCONTINUED | OUTPATIENT
Start: 2021-04-20 | End: 2021-04-20 | Stop reason: HOSPADM

## 2021-04-20 RX ADMIN — PANTOPRAZOLE SODIUM 40 MG: 40 TABLET, DELAYED RELEASE ORAL at 07:41

## 2021-04-20 RX ADMIN — ACETAMINOPHEN 650 MG: 325 TABLET ORAL at 10:11

## 2021-04-20 RX ADMIN — SODIUM CHLORIDE, POTASSIUM CHLORIDE, SODIUM LACTATE AND CALCIUM CHLORIDE: 600; 310; 30; 20 INJECTION, SOLUTION INTRAVENOUS at 08:30

## 2021-04-20 RX ADMIN — HYDROMORPHONE HYDROCHLORIDE 0.5 MG: 1 INJECTION, SOLUTION INTRAMUSCULAR; INTRAVENOUS; SUBCUTANEOUS at 01:18

## 2021-04-20 RX ADMIN — CEFAZOLIN SODIUM 2000 MG: 10 INJECTION, POWDER, FOR SOLUTION INTRAVENOUS at 06:58

## 2021-04-20 RX ADMIN — HYDROCODONE BITARTRATE AND ACETAMINOPHEN 1 TABLET: 5; 325 TABLET ORAL at 07:00

## 2021-04-20 RX ADMIN — CEFAZOLIN SODIUM 2000 MG: 10 INJECTION, POWDER, FOR SOLUTION INTRAVENOUS at 06:30

## 2021-04-20 RX ADMIN — ASPIRIN 325 MG: 325 TABLET, COATED ORAL at 08:23

## 2021-04-20 ASSESSMENT — PAIN SCALES - GENERAL
PAINLEVEL_OUTOF10: 8
PAINLEVEL_OUTOF10: 0

## 2021-04-20 NOTE — PROGRESS NOTES
Occupational Therapy    [address] [facility],2[address] ACUTE CARE OCCUPATIONAL THERAPY EVALUATION    Sandra Butt Sr., 1945, 1116/1116-A, 4/20/2021    Discharge Recommendation: Home with initial 24 hour supervision/assistance, Rolling Walker, Tub Transfer Bench      History:  Native:  The encounter diagnosis was Status post right knee replacement. Subjective:  Patient states: \"I'm ready to get home and get washed up real good. \"  Pain: Pt reported 2/10 surgical pain in Rt knee  Communication with other providers: RN VAN Lopez, PTA Bin Wood  Restrictions: General Precautions, Low Fall Risk, WBAT Rt LE, IV, SCDs, Bed/chair alarm    Home Setup/Prior level of function:  Social/Functional History  Lives With: Alone (+ cat; pt has girlfriend who visits frequently)  Type of Home: House  Home Layout: One level  Home Access: Stairs to enter without rails  Entrance Stairs - Number of Steps: 1  Bathroom Shower/Tub: Tub/Shower unit  Bathroom Toilet: Standard  Home Equipment: Kinnie Newton  ADL Assistance: Independent  Homemaking Assistance: Independent  Ambulation Assistance: Independent (uses no AD)  Transfer Assistance: Independent  Active : Yes  Occupation: Retired-poured concrete     Examination:  · Observation: Ambulating in hallway with PTA upon arrival. Agreeable to OT evaluation and PTA transitioned care to this therapist.   · Vision: WFL (Glasses)  · Hearing: [facility]  · Vitals: Stable vitals throughout session    Body Systems and functions:  · ROM: WNL all joints in BL UEs  · Strength: 4+5 MMT all major muscle groups BL UEs  · Sensation: WFL (denies numbness/tingling)  · Tone: Normal  · Coordination: WFL  · Perception: WNL    Activities of Daily Living (ADLs):  · Feeding: Independent   · Grooming: SBA (able to complete in standing at sink)  · UB bathing: SBA  · LB bathing: CGA (light dynamic standing balance for safety with reaching bottom)  · UB dressing: SBA (donning clean robe seated EOB)  · LB dressing: CGA (light

## 2021-04-20 NOTE — PROGRESS NOTES
Physical Therapy    Physical Therapy Treatment Note  Name: Jag Juarez MRN: [id number] :   1945   Date:  2021   Admission Date: 2021 Room:  [address] [place] [address]   Restrictions/Precautions:  Restrictions/Precautions  Restrictions/Precautions: General Precautions, Weight Bearing Lower Extremity Weight Bearing Restrictions  Right Lower Extremity Weight Bearing: Weight Bearing As Tolerated      s/p R TKA. Communication with other providers:    Subjective:  Patient states: Motivated and agreeable to tx  Pain:   Location, Type, Intensity (0/10 to 10/10):  Reports pain with bending and straightening, medial spot on knee at aprox 65 degrees. Objective:    Observation:  Alert and oriented  Treatment, including education/measures:  Pt given TKA booklet and discussed safety and performed ex. Ex in sup, sitting and standing:  10 reps aps  10 reps with 5 sec hold quad sets  10 reps with 10 sec hold glut sets  10  Reps SLR with 3 sec hold  10 reps saq  10 reps laqs ( pt c/o pain with popping sensation at 65 degrees flex)  Standing HS curls standing at walker x10 reps. Sup to sit sba/Mod I  Sit to stand from bed cues for hand placement for safety. Up/ramu 5 steps with bilateral rails cga and cues for safety  amb with rw 120' sba/cga and cues for walker safety  Pt left in care of OT. Assessment / Impression:       Patient's tolerance of treatment:  good  Adverse Reaction: na  Significant change in status and impact:  na  Barriers to improvement:  strength and safety  Plan for Next Session:    Cont.  POC  Time in:  0830  Time out:  0910  Timed treatment minutes:  40  Total treatment time:  36    Previously filed items:  Social/Functional History  Lives With: Alone(+ cat)  Type of Home: House  Home Layout: One level  Home Access: Stairs to enter without rails  Entrance Stairs - Number of Steps: 1  Bathroom Shower/Tub: Tub/Shower unit  Bathroom Toilet: Standard  Home Equipment: Cane  ADL Assistance: Independent  Homemaking Assistance: Independent  Ambulation Assistance: Independent  Transfer Assistance: Independent  Active : Yes     Long term goals  Time Frame for Long term goals :  In one week:  Long term goal 1: Pt will complete all bed mobility independently  Long term goal 2: Pt will complete sit <> stand transfers independently  Long term goal 3: Pt will ambulate 300 feet with modified independence with LRAD  Long term goal 4: Pt will ascend/descend 2 steps with a handrail with SBAx1  Long term goal 5: Pt will independently complete 3 sets of 10 reps of BLE AROM exercises in available and allowed ROM    Electronically signed by:    Modesto Zamora PTA  4/20/2021, 8:21 AM

## 2021-04-20 NOTE — PROGRESS NOTES
Hospitalist Progress Note      Name:  Ramya Joya /Age/Sex: 1945  (66 y.o. male)   MRN & CSN:  [id number] & [id number] Admission Date/Time: 2021  5:47 AM   Location:  1116/1116-A PCP: JORDAN Muro         Hospital Day: 2    Assessment and Plan:   Ramya Joya is a 66 y.o.  male  who presents with right knee osteoarthritis status post right total  knee arthroplasty.         #Right total knee arthroplasty DOS 2021  -Pain control  -DVT prophylaxis: SCDs/aspirin, per general surgery  -PT OT recommendation  -Spirometry recommendations     #GERD  -Pantoprazole  -Tums     Other chronic medical conditions: Resume home meds except for contraindicated  -Asthma, has not used inhalers in years however will prescribe. -Chronic back pain  -Arthritis. Diet DIET GENERAL;   DVT Prophylaxis [] Lovenox, []  Heparin, [] SCDs, [] Ambulation   GI Prophylaxis [] PPI,  [] H2 Blocker,  [] Carafate,  [] Diet/Tube Feeds   Code Status Full Code   Disposition Patientfor D/c today         History of Present Illness:     Chief Complaint: <principal problem not specified>  Ramya Joya is a 66 y.o.  male  who presents with OA , s/p Right TKR    He has moderate post op pain. No numbness, tingling or weakness       Ten point ROS reviewed negative, unless as noted above    Objective: Intake/Output Summary (Last 24 hours) at 2021 0821  Last data filed at 2021 1645  Gross per 24 hour   Intake 600 ml   Output 705 ml   Net -105 ml      Vitals:   Vitals:    21 0815   BP: (!) 118/58   Pulse: 83   Resp: 20   Temp: 98.9 °F (37.2 °C)   SpO2: 92%     Physical Exam:   GEN    Awake male, sitting upright in bed in no apparent distress. Appears given age. EYES   Pupils are equally round. No scleral erythema, discharge, or conjunctivitis. HENT  Mucous membranes are moist. Oral pharynx without exudates, no evidence of thrush. NECK  Supple, no apparent thyromegaly or masses.   RESP  Clear to auscultation, no wheezes, rales or rhonchi. Symmetric chest movement while on room air. CARDIO/VASC           S1/S2 auscultated. Regular rate without appreciable murmurs, rubs, or gallops. No JVD or carotid bruits. Peripheral pulses equal bilaterally and palpable. No peripheral edema. GI        Abdomen is soft without significant tenderness, masses, or guarding. Bowel sounds are normoactive. Rectal exam deferred.        No costovertebral angle tenderness. Normal appearing external genitalia. Stafford catheter is not present. HEME/LYMPH            No palpable cervical lymphadenopathy and no hepatosplenomegaly. No petechiae or ecchymoses. MSK    No gross joint deformities. Right knee in healing, dressing is removed. SKIN    Normal coloration, warm, dry. NEURO           Cranial nerves appear grossly intact, normal speech, no lateralizing weakness. PSYCH            Awake, alert, oriented x 4. Affect appropriate.     Medications:   Medications:    ceFAZolin (ANCEF) IVPB  2,000 mg Intravenous Once    sodium chloride flush  10 mL Intravenous 2 times per day    acetaminophen  650 mg Oral Q6H    aspirin  325 mg Oral BID    pantoprazole  40 mg Oral QAM AC      Infusions:    lactated ringers 75 mL/hr at 04/19/21 1606    sodium chloride       PRN Meds: ipratropium, 2 puff, Q4H PRN    And  albuterol sulfate HFA, 2 puff, Q4H PRN  diphenhydrAMINE, 25 mg, Q6H PRN  sodium chloride flush, 10 mL, PRN  sodium chloride, 25 mL, PRN  HYDROmorphone, 0.25 mg, Q3H PRN    Or  HYDROmorphone, 0.5 mg, Q3H PRN  senna, 1 tablet, Daily PRN  HYDROcodone 5 mg - acetaminophen, 1 tablet, Q4H PRN  ondansetron, 4 mg, Q8H PRN    Or  ondansetron, 4 mg, Q6H PRN  calcium carbonate, 500 mg, TID PRN          Electronically signed by Cindy Ruiz MD on 4/20/2021 at 8:21 AM

## 2021-04-20 NOTE — CARE COORDINATION
OT at [facility] with recommendations that the Pt get a walker. LSW into pt room. Pt is home alone however he has a S/O whom he sees daily. Pt stated that he is in need of a walker. LSW discussed the therapy options with the Pt. Pt stated that he is going to go to outpt PT in [employer]. Pt plans on using [employer] transit. Pt stated that his son is going to pick him up today. LSW requested to contact Pt son about picking up the walker. Call to Pt son Dotty Anaya. He is picking up the Pt today. LSW informed him where the walker will be at. Pt son is going to get the walker before he picks up the Pt. Call to [employer] to let them know that the Pt son is picking up the walker.      Call to RN to inform her when the Pt son will be here to  the Pt (12:30)

## 2021-04-20 NOTE — CARE COORDINATION
CM was notified by therapy pt will need a rolling walker at discharge. Electronically signed by Jackson Brantley RN on 4/20/2021 at 10:22 AM     Klarissa Acevedo Sr. was evaluated today and a DME order was entered for a wheeled walker because he requires this to successfully complete daily living tasks of eating, bathing, toileting, personal cares, ambulating, grooming and hygiene. A wheeled walker is necessary due to the patient's unsteady gait, upper body weakness, and inability to  an ambulation device; and he can ambulate only by pushing a walker instead of a lesser assistive device such as a cane, crutch, or standard walker. The need for this equipment was discussed with the patient and he understands and is in agreement.

## 2021-04-20 NOTE — DISCHARGE SUMMARY
DISCHARGE SUMMARY:  Taty Driscoll MD     Date of Admission:      4/19/2021  Date of Discharge:    4/20/2021     Admission Diagnosis   Primary osteoarthritis of right knee [M17.11]   Discharge Diagnosis   Same    Procedure:    Right Total knee replacement 4/19/2021    Consultations:  IP CONSULT TO HOSPITALIST  IP CONSULT TO CASE MANAGEMENT  IP CONSULT TO HOME CARE NEEDS    Brief history and hospital stay. Nicolasa Deras is a 66 y.o. male who underwent total knee replacement procedure without complication. Nicolasa Acosta was admitted to the floor following surgery. Appropriate consults were obtained as outlined in progress notes. The patients diet was advanced as tolerated. The patient remained hemodynamically stable and neurovascularly intact in the lower extremity throughout the hospital course. On the day of discharge the patient's calf remained soft and showed no evidence of DVT. Physical therapy and occupational therapy were consulted. The patient progressed well with PT/OT throughout the stay. Hemoglobin was checked on postoperative day #1 and DVT prophylaxis was initiated and will continue for 2 weeks. The patients pain was controlled initially with IV pain medications and then was transitioned to oral pain medications prior to discharge    The patient was discharged to Home and will continue to follow the precautions outlined to them by us and PT/OT. Condition on Discharge: Stable    Medications     Haritha, [address] [facility]  \"[place]\"   Home Medication Instructions ABO:[id number]    Printed on:04/20/21 5205   Medication Information                      aspirin 325 MG EC tablet  Take 1 tablet by mouth 2 times daily for 15 days             diphenhydrAMINE (ALLERGY) 25 MG tablet  Take 25 mg by mouth every 6 hours as needed for Itching             docusate sodium (COLACE) 100 MG capsule  Take 1 capsule by mouth daily as needed for Constipation             HYDROcodone-acetaminophen (NORCO) 5-325 MG per tablet  Take 1 tablet by mouth every 4 hours as needed for Pain for up to 7 days. Intended supply: 7 days. Take lowest dose possible to manage pain                   Plan  Discharge instructions were given along with prescriptions for pain medications as well as DVT prophylaxis. Plan is to follow up in 10-14 days for a wound check and x-rays. Patient was instructed on the use of pain medications, the signs and symptoms of infection or blood clot, and was given our number to call should they have any questions or concerns following discharge.     Bakari Carrillo MD

## 2021-04-26 ENCOUNTER — TELEPHONE (OUTPATIENT)
Dept: ORTHOPEDIC SURGERY | Age: 76
End: 2021-04-26

## 2021-04-26 DIAGNOSIS — Z96.651 S/P TOTAL KNEE ARTHROPLASTY, RIGHT: Primary | ICD-10-CM

## 2021-04-26 NOTE — TELEPHONE ENCOUNTER
Patient called requesting physical therapy. He states that he was given a HEP but he would like a referral to formal therapy at [facility]  in [place].

## 2021-04-28 ENCOUNTER — HOSPITAL ENCOUNTER (OUTPATIENT)
Dept: PHYSICAL THERAPY | Age: 76
Setting detail: THERAPIES SERIES
Discharge: HOME | End: 2021-04-28
Payer: MEDICARE

## 2021-04-28 PROCEDURE — 97164 PT RE-EVAL EST PLAN CARE: CPT

## 2021-04-28 PROCEDURE — 97016 VASOPNEUMATIC DEVICE THERAPY: CPT

## 2021-04-28 PROCEDURE — 97110 THERAPEUTIC EXERCISES: CPT

## 2021-04-28 ASSESSMENT — PAIN DESCRIPTION - PAIN TYPE: TYPE: SURGICAL PAIN

## 2021-04-28 ASSESSMENT — PAIN DESCRIPTION - PROGRESSION: CLINICAL_PROGRESSION: GRADUALLY IMPROVING

## 2021-04-28 NOTE — PLAN OF CARE
Outpatient Physical Therapy           [place]           [] Phone: [phone]   Fax: [phone]  [place]           [x] Phone: [phone]   Fax: [phone]     To: Referring Practitioner: Edmund Camarillo    From: Zafar Ho, PT     Patient: Ryan Orozco Sr.       : 1945  Diagnosis: Diagnosis: R TKA 21   Treatment Diagnosis:   R knee stiffness/ impaired mobility  Date: 2021    Physical Therapy Certification/Re-Certification Form    The following patient has been evaluated for physical therapy services and for therapy to continue, insurance requires physician review of the treatment plan initially and every 90 days. Please review the attached evaluation and/or summary of the patient's plan of care, and verify that you agree therapy should continue by signing the attached document and sending it back to our office.     Assessment:    Patient primary complaints: R knee stiffness/impaired mobility  History of condition:R  TKA 21- 1 night stay- then home- difficulty sleeping eith er in bed/couch  Current functional limitations:  difficulty sleeping either in bed/couch  Clinical findings:TUG 12.5 sec;  6 STS with arms across chest in 30 sec; R knee * FLEX to -10* EXT; I SLR - ambulates with cane for balance  PLOF:retired, lives alone; independent with all necessary ADL/IADL  Skilled PT interventions are intended to:increase R knee ROM  which will enable patient  to improve quality of life  Patient agrees with established plan of care and assisted in the development of their  goals  Barriers to learning:none- no mental/cognitive barriers observed  Preferred learning style(s):   written- demonstration -practice  Preferred Language: English  Potential barriers to progress:none  The patient appears motivated to participate in PT : yes      Plan of Care/Treatment to date:  [x] Therapeutic Exercise  [] Modalities:  [x] Therapeutic Activity     [] Ultrasound  [] Electrical Stimulation  [x] Gait Training      [] Cervical Traction [] Lumbar Traction  [x] Neuromuscular Re-education    [] Cold/hotpack [] Iontophoresis   [x] Instruction in HEP      [x] Vasopneumatic    [] Dry Needling  [x] Manual Therapy               [] Aquatic Therapy       Other:          Frequency/Duration:  # Days per week: [] 1 day # Weeks: [] 1 week [] 5 weeks     [x] 2 days   [] 2 weeks [x] 6 weeks     [] 3 days   [] 3 weeks [] 7 weeks     [] 4 days   [] 4 weeks [] 8 weeks         [] 9 weeks [] 10 weeks         [] 11 weeks [] 12 weeks    Rehab Potential/Progress: [] Excellent [x] Good [] Fair  [] Poor     Goals:         Long term goals  Time Frame for Long term goals : 6 weeks 6/15/21  Long term goal 1: patient's goal -get well  Long term goal 2: patient will complete TUG test in 9 sec without cane  Long term goal 3: 10 STS with hands across chext in  30 sec indicating increase leg strength  Long term goal 4: patient will demonstrate no greater than -5* R knee EXT which will enable patient to walk without a limp      Electronically signed by:  Adonis Workman PT, 4/28/2021, 5:42 PM        If you have any questions or concerns, please don't hesitate to call.   Thank you for your referral.      Physician Signature:________________________________Date:_________ TIME: _____  By signing above, therapists plan is approved by physician

## 2021-04-28 NOTE — PROGRESS NOTES
Physical Therapy  RE Assessment  Date: 2021  Patient Name: Shameka Cerda MRN: [id number]  : 1945          Restrictions  Restrictions/Precautions  Restrictions/Precautions: Weight Bearing  Lower Extremity Weight Bearing Restrictions  Right Lower Extremity Weight Bearing: Weight Bearing As Tolerated    Subjective   General  Chart Reviewed: Yes  Patient assessed for rehabilitation services?: Yes  Additional Pertinent Hx: L knee surgery 10/10/17  Family / Caregiver Present: Yes  Referring Practitioner: Pro Gonzalez  Diagnosis: R TKA 21  PT Visit Information  PT Insurance Information: Medicare advantage  Subjective  Subjective: R  TKA 21- 1 night stay- then home- difficulty sleeping eith er in bed/couch  Pain Assessment  Pain Level: 5  Pain Type: Surgical pain  Pain Orientation: Right  Clinical Progression: Gradually improving  Functional Pain Assessment: Prevents or interferes some active activities and ADLs    Vision/Hearing  Vision  Vision: Impaired(macular degeneration)  Hearing  Hearing Exceptions: Hard of hearing/hearing concerns    Orientation  Orientation  Overall Orientation Status: Within Normal Limits    Social/Functional History  Social/Functional History  Lives With: Alone  Type of Home: Apartment  Home Layout: One level  Home Access: Level entry  Bathroom Shower/Tub: Tub/Shower unit  Receives Help From: Friend(s)  ADL Assistance: Independent  Homemaking Assistance: Independent  Homemaking Responsibilities: Yes  Meal Prep Responsibility: Primary  Laundry Responsibility: Primary  Cleaning Responsibility: Primary  Shopping Responsibility: Primary  Ambulation Assistance: Independent  Transfer Assistance: Independent  Active : Yes  Mode of Transportation: Car  Occupation: Retired    Objective     Observation/Palpation  Scar: incision healing    AROM RLE (degrees)  R Knee Flexion 0-145: 110* to -10*    Strength RLE  R Knee Extension: 3+/5; At least                      Ambulation

## 2021-04-28 NOTE — FLOWSHEET NOTE
Outpatient Physical Therapy  [place]           [] Phone: [phone]   Fax: [phone]  [place]           [x] Phone: [phone]   Fax: [phone]        Physical Therapy Daily Treatment Note  Date:  2021    Patient Name:  Maicol Rangel.    :  1945  MRN: [id number]  Restrictions/Precautions:  WBAT R  LE  Diagnosis:   Diagnosis: R TKA 21  Date of Injury/Surgery:   Treatment Diagnosis:    R knee stiffness/impaired mobility  Insurance/Certification information: PT Insurance Information: Medicare advantage   Referring Physician:  Referring Practitioner: Aracelis Lawson  Next Doctor Visit:    Plan of care signed (Y/N):    Outcome Measure: TUG 12.5 sec;  6 STS with arms across chest in 30 sec  Visit# / total visits:  1 /10 then PN - plan expires 6/15/21  Pain level: 5/10  R knee   Goals:          Long term goals  Time Frame for Long term goals : 6 weeks 6/15/21  Long term goal 1: patient's goal -get well  Long term goal 2: patient will complete TUG test in 9 sec without cane  Long term goal 3: 10 STS with hands across chext in  30 sec indicating increase leg strength  Long term goal 4: patient will demonstrate no greater than -5* R knee EXT which will enable patient to walk without a limp    Summary of Evaluation: ASSESSMENT  Patient primary complaints: R knee stiffness/impaired mobility  History of condition:R  TKA 21- 1 night stay- then home- difficulty sleeping eith er in bed/couch  Current functional limitations:  difficulty sleeping either in bed/couch  Clinical findings:TUG 12.5 sec;  6 STS with arms across chest in 30 sec; R knee * FLEX to -10* EXT; I SLR - ambulates with cane for balance  PLOF:retired, lives alone; independent with all necessary ADL/IADL  Skilled PT interventions are intended to:increase R knee ROM  which will enable patient  to improve quality of life  Patient agrees with established plan of care and assisted in the development of their  goals  Barriers to learning:none- no mental/cognitive barriers observed  Preferred learning style(s):   written- demonstration -practice  Preferred Language: English  Potential barriers to progress:none  The patient appears motivated to participate in PT : yes      Subjective:  See omer         Any changes in Ambulatory Summary Sheet? None        Objective:  See omer   COVID screening questions were asked and patient attested that there had been no contact or symptoms        Exercises: (No more than 4 columns)   Exercise/Equipment Date 4/28/21 Date Date           WARM UP         nustep 10' L 3  Seat 8/arms 8           TABLE      Heel slides With strap 10x 5\"     SAQ Med roll 10 x 5\"     QS with towel roll 10 x 5\"     Heel prop Small roll x3'              STANDING                                                     PROPRIOCEPTION                                    MODALITIES See below                     Other Therapeutic Activities/Education:        Home Exercise Program:  Continue with pre op HEP      Manual Treatments:        Modalities:Patient received vasocompression on their R knee for pain and inflammation for 10 min on low pressure. Patient had negative skin reaction afterwards. Communication with other providers:        Assessment:  (Response towards treatment session) (Pain Rating)  Pt tolerated  treatment without any adverse reactions or complications this date.  . Pt would continue to benefit from skilled therapy interventions to address remaining impairments, improve mobility and strength,  and progress toward goal completion and prepare for d/c including finalizing HEP ;  .  Pain complaints after session 0/10    Plan for Next Session:        Time In / Time Out:  See mattie tello               Timed Code/Total Treatment Minutes:  25' TE x2/50' includes vaso and mattie tello    Next Progress Note due:        Plan of Care Interventions:  [x] Therapeutic Exercise  [] Modalities:  [x] Therapeutic Activity     [] Ultrasound  [] Estim  [x] Gait Training      [] Cervical Traction [] Lumbar Traction  [x] Neuromuscular Re-education    [] Cold/hotpack [] Iontophoresis   [x] Instruction in HEP      [x] Vasopneumatic   [] Dry Needling    [x] Manual Therapy               [] Aquatic Therapy              Electronically signed by:  Tamica Funes 4/29/2021, 1:00 PM

## 2021-04-30 ENCOUNTER — HOSPITAL ENCOUNTER (OUTPATIENT)
Dept: PHYSICAL THERAPY | Age: 76
Setting detail: THERAPIES SERIES
Discharge: HOME | End: 2021-04-30
Payer: MEDICARE

## 2021-04-30 PROCEDURE — 97016 VASOPNEUMATIC DEVICE THERAPY: CPT

## 2021-04-30 PROCEDURE — 97110 THERAPEUTIC EXERCISES: CPT

## 2021-04-30 NOTE — FLOWSHEET NOTE
Outpatient Physical Therapy  [place]           [] Phone: [phone]   Fax: [phone]  [place]           [x] Phone: [phone]   Fax: [phone]        Physical Therapy Daily Treatment Note  Date:  2021    Patient Name:  Belen Mcdowell.    :  1945  MRN: [id number]  Restrictions/Precautions:  WBAT R  LE  Diagnosis:   Diagnosis: R TKA 21  Date of Injury/Surgery:   Treatment Diagnosis:    R knee stiffness/impaired mobility  Insurance/Certification information: PT Insurance Information: Medicare advantage   Referring Physician:  Referring Practitioner: Helio Lama Doctor Visit:    Plan of care signed (Y/N):    Outcome Measure: TUG 12.5 sec;  6 STS with arms across chest in 30 sec  Visit# / total visits:  2/10 then PN - plan expires 6/15/21  Pain level: 510  R knee   Goals:          Long term goals  Time Frame for Long term goals : 6 weeks 6/15/21  Long term goal 1: patient's goal -get well  Long term goal 2: patient will complete TUG test in 9 sec without cane  Long term goal 3: 10 STS with hands across chext in  30 sec indicating increase leg strength  Long term goal 4: patient will demonstrate no greater than -5* R knee EXT which will enable patient to walk without a limp    Summary of Evaluation: ASSESSMENT  Patient primary complaints: R knee stiffness/impaired mobility  History of condition:R  TKA 21- 1 night stay- then home- difficulty sleeping eith er in bed/couch  Current functional limitations:  difficulty sleeping either in bed/couch  Clinical findings:TUG 12.5 sec;  6 STS with arms across chest in 30 sec; R knee * FLEX to -10* EXT; I SLR - ambulates with cane for balance  PLOF:retired, lives alone; independent with all necessary ADL/IADL  Skilled PT interventions are intended to:increase R knee ROM  which will enable patient  to improve quality of life  Patient agrees with established plan of care and assisted in the development of their  goals  Barriers to learning:none- no mental/cognitive barriers observed  Preferred learning style(s):   written- demonstration -practice  Preferred Language: English  Potential barriers to progress:none  The patient appears motivated to participate in PT : yes      Subjective:   Patient reports of 5/10 pain upon arrival and appears amb with a SPC,        Any changes in Ambulatory Summary Sheet? None        Objective:  AAROM Flex 120*  COVID screening questions were asked and patient attested that there had been no contact or symptoms        Exercises: (No more than 4 columns)   Exercise/Equipment Date 4/28/21 Date 4/30/2021 Date           WARM UP         nustep 10' L 3  Seat 8/arms 8 ---------    Bike   S5  5'    TABLE      Heel slides With strap 10x 5\" W/strap 10x10\"    SAQ Med roll 10 x 5\" Med/small roll 10x5\"    QS with towel roll 10 x 5\" Left out by mistake    Heel prop Small roll x3' Small roll x3'             STANDING                                                     PROPRIOCEPTION                                    MODALITIES See below                     Other Therapeutic Activities/Education:        Home Exercise Program:  Continue with pre op HEP      Manual Treatments:        Modalities:Patient received vasocompression on their R knee for pain and inflammation for 10 min on low pressure. Patient had negative skin reaction afterwards. Communication with other providers:        Assessment:  (Response towards treatment session) (Pain Rating)  Pt tolerated  treatment without any adverse reactions or complications this date.  . Pt would continue to benefit from skilled therapy interventions to address remaining impairments, improve mobility and strength,  and progress toward goal completion and prepare for d/c including finalizing HEP ;  .  Pain complaints after session 5/10    Plan for Next Session:        Time In / Time Out:  1429/1507             Timed Code/Total Treatment Minutes: 38 10vaso 28te      Next Progress Note due:        Plan of Care Interventions:  [x] Therapeutic Exercise  [] Modalities:  [x] Therapeutic Activity     [] Ultrasound  [] Estim  [x] Gait Training      [] Cervical Traction [] Lumbar Traction  [x] Neuromuscular Re-education    [] Cold/hotpack [] Iontophoresis   [x] Instruction in HEP      [x] Vasopneumatic   [] Dry Needling    [x] Manual Therapy               [] Aquatic Therapy              Electronically signed by:  Yael Rolle 4/30/2021, 2:29 PM

## 2021-05-03 ENCOUNTER — HOSPITAL ENCOUNTER (OUTPATIENT)
Dept: PHYSICAL THERAPY | Age: 76
Setting detail: THERAPIES SERIES
Discharge: HOME | End: 2021-05-03
Payer: MEDICARE

## 2021-05-03 PROCEDURE — 97110 THERAPEUTIC EXERCISES: CPT

## 2021-05-03 NOTE — FLOWSHEET NOTE
finalizing HEP ;  .  Pain complaints after session 5/10    Plan for Next Session:        Time In / Time Out:   1006/1036           Timed Code/Total Treatment Minutes:  30te     Next Progress Note due:        Plan of Care Interventions:  [x] Therapeutic Exercise  [] Modalities:  [x] Therapeutic Activity     [] Ultrasound  [] Estim  [x] Gait Training      [] Cervical Traction [] Lumbar Traction  [x] Neuromuscular Re-education    [] Cold/hotpack [] Iontophoresis   [x] Instruction in HEP      [x] Vasopneumatic   [] Dry Needling    [x] Manual Therapy               [] Aquatic Therapy              Electronically signed by:  Rogers Espinoza 5/3/2021, 10:06 AM

## 2021-05-04 ENCOUNTER — OFFICE VISIT (OUTPATIENT)
Dept: ORTHOPEDIC SURGERY | Age: 76
End: 2021-05-04

## 2021-05-04 VITALS — BODY MASS INDEX: 25.07 KG/M2 | HEIGHT: 66 IN | WEIGHT: 156 LBS | RESPIRATION RATE: 15 BRPM

## 2021-05-04 DIAGNOSIS — Z96.651 S/P TOTAL KNEE ARTHROPLASTY, RIGHT: Primary | ICD-10-CM

## 2021-05-04 DIAGNOSIS — Z09 POSTOP CHECK: ICD-10-CM

## 2021-05-04 PROCEDURE — 99024 POSTOP FOLLOW-UP VISIT: CPT | Performed by: ORTHOPAEDIC SURGERY

## 2021-05-04 RX ORDER — HYDROCODONE BITARTRATE AND ACETAMINOPHEN 5; 325 MG/1; MG/1
1 TABLET ORAL EVERY 6 HOURS PRN
Qty: 20 TABLET | Refills: 0 | Status: SHIPPED | OUTPATIENT
Start: 2021-05-04 | End: 2021-05-11

## 2021-05-04 RX ORDER — HYDROCODONE BITARTRATE AND ACETAMINOPHEN 5; 325 MG/1; MG/1
1 TABLET ORAL EVERY 6 HOURS PRN
COMMUNITY
End: 2021-07-02 | Stop reason: CLARIF

## 2021-05-04 NOTE — PROGRESS NOTES
Patient returns to the office today for post op check of the right TKA DOS 4/19/21. Pt states pain today in the right knee is a 6/10. Pt states that pain is very achy at night and has a difficult time sleeping at night.  Pt states he has been working with physical therapy which is helping with the pain
Office Procedures:  No orders of the defined types were placed in this encounter. Assessment and Plan    1. Right total knee replacement    The patient is healing appropriately well following the surgery. We reviewed the x-rays. We also discussed the healing incision site. I have explained to them the importance of continuing physical therapy to advance stretching, range of motion and strengthening. We discussed the use of gait aids and progression of ambulatory activities at the guidance of the patient's therapist.    We discussed limiting the use of narcotic pain medications and transitioning to over-the-counter medication such as Tylenol or ibuprofen as pain allows. I recommended follow-up here in 4 weeks for repeat exam to evaluate progression of range of motion and strengthening. They will return sooner if there are any concerns for infection, swelling, stiffness, or other concerns.           Electronically signed by Clarice Whitfield MD on 5/4/2021 at 4:43 PM

## 2021-05-05 ENCOUNTER — HOSPITAL ENCOUNTER (OUTPATIENT)
Dept: PHYSICAL THERAPY | Age: 76
Setting detail: THERAPIES SERIES
Discharge: HOME | End: 2021-05-05
Payer: MEDICARE

## 2021-05-05 PROCEDURE — 97110 THERAPEUTIC EXERCISES: CPT

## 2021-05-05 PROCEDURE — 97016 VASOPNEUMATIC DEVICE THERAPY: CPT

## 2021-05-05 NOTE — FLOWSHEET NOTE
Outpatient Physical Therapy  [place]           [] Phone: [phone]   Fax: [phone]  [place]           [x] Phone: [phone]   Fax: [phone]        Physical Therapy Daily Treatment Note  Date:  2021    Patient Name:  Nelly Rosales Sr.    :  1945  MRN: [id number]  Restrictions/Precautions:  WBAT R  LE  Diagnosis:   Diagnosis: R TKA 21  Date of Injury/Surgery:   Treatment Diagnosis:    R knee stiffness/impaired mobility  Insurance/Certification information: PT Insurance Information: Medicare advantage   Referring Physician:  Referring Practitioner: Alfonso Meredith  Next Doctor Visit:    Plan of care signed (Y/N):    Outcome Measure: TUG 12.5 sec;  6 STS with arms across chest in 30 sec  Visit# / total visits: 4/10 then PN - plan expires 6/15/21  Pain level: 5/10  R knee   Goals:          Long term goals  Time Frame for Long term goals : 6 weeks 6/15/21  Long term goal 1: patient's goal -get well  Long term goal 2: patient will complete TUG test in 9 sec without cane  Long term goal 3: 10 STS with hands across chext in  30 sec indicating increase leg strength  Long term goal 4: patient will demonstrate no greater than -5* R knee EXT which will enable patient to walk without a limp    Summary of Evaluation: ASSESSMENT  Patient primary complaints: R knee stiffness/impaired mobility  History of condition:R  TKA 21- 1 night stay- then home- difficulty sleeping ei er in bed/couch  Current functional limitations:  difficulty sleeping either in bed/couch  Clinical findings:TUG 12.5 sec;  6 STS with arms across chest in 30 sec; R knee * FLEX to -10* EXT; I SLR - ambulates with cane for balance  PLOF:retired, lives alone; independent with all necessary ADL/IADL  Skilled PT interventions are intended to:increase R knee ROM  which will enable patient  to improve quality of life  Patient agrees with established plan of care and assisted in the development of their  goals  Barriers to learning:none- no mental/cognitive barriers observed  Preferred learning style(s):   written- demonstration -practice  Preferred Language: English  Potential barriers to progress:none  The patient appears motivated to participate in PT : yes      Subjective:   Patient reports of 5/10 pain upon arrival and appears amb with a SPC, he states: \"My knee is really hurting/aching today probably from the weather. \"      Any changes in Ambulatory Summary Sheet? None        Objective:  AAROM Flex 120*+  Ext -3*  COVID screening questions were asked and patient attested that there had been no contact or symptoms        Exercises: (No more than 4 columns)   Exercise/Equipment Date 4/28/21 Date 4/30/2021 Date 5/3/2021 5/5/2021            WARM UP       Bike   S5  5' S5 7' S4  7'   TABLE       Heel slides With strap 10x 5\" W/strap 10x10\" 10x10\" no strap 10x10\" no strap   SAQ Med roll 10 x 5\" Med/small roll 10x5\" Med/small roll 15x5\" Med/small roll 15x5\"   QS with towel roll 10 x 5\" Left out by mistake 10x5\" 10x5\"   Heel prop Small roll x3' Small roll x3' Small roll 4' 1/2 roll 4'              STANDING       HC stretch    1/2 wedge 2x30\"   Step ups    2\" 2x10   3-way SLR    10x ea way B                                    PROPRIOCEPTION                                          MODALITIES See below                        Other Therapeutic Activities/Education:        Home Exercise Program:  Continue with pre op HEP      Manual Treatments:        Modalities:Patient received vasocompression on their R knee for pain and inflammation for 10 min on low pressure. Patient had negative skin reaction afterwards.          Communication with other providers:        Assessment:  (Response towards treatment session) (Pain Rating) Patient requires VC's to slow down during exs and much encouragement to relax during the heel prop         Pt would continue to benefit from skilled therapy interventions to address remaining impairments, improve mobility

## 2021-05-06 ENCOUNTER — TELEPHONE (OUTPATIENT)
Dept: ORTHOPEDIC SURGERY | Age: 76
End: 2021-05-06

## 2021-05-06 DIAGNOSIS — Z96.651 TOTAL KNEE REPLACEMENT STATUS, RIGHT: Primary | ICD-10-CM

## 2021-05-06 RX ORDER — HYDROCODONE BITARTRATE AND ACETAMINOPHEN 5; 325 MG/1; MG/1
1 TABLET ORAL EVERY 6 HOURS PRN
Qty: 28 TABLET | Refills: 0 | Status: SHIPPED | OUTPATIENT
Start: 2021-05-06 | End: 2021-05-13

## 2021-05-06 NOTE — TELEPHONE ENCOUNTER
Called and spoke with Candice Victoria at [address] [place] [address] in [place] and they have generic norco that she can fill.  Jair send in Princella Cola Prescription to [facility] in [place]

## 2021-05-10 NOTE — ADDENDUM NOTE
Addendum  created 05/10/21 0707 by JULIA Allen CRNA    Child order released for a procedure order, Clinical Note Signed, Diagnosis association updated, Intraprocedure Blocks edited, Order Canceled from Note

## 2021-05-18 ENCOUNTER — HOSPITAL ENCOUNTER (OUTPATIENT)
Dept: PHYSICAL THERAPY | Age: 76
Setting detail: THERAPIES SERIES
Discharge: HOME | End: 2021-05-18
Payer: MEDICARE

## 2021-05-18 PROCEDURE — 97110 THERAPEUTIC EXERCISES: CPT

## 2021-05-18 PROCEDURE — 97016 VASOPNEUMATIC DEVICE THERAPY: CPT

## 2021-05-18 NOTE — FLOWSHEET NOTE
Outpatient Physical Therapy  [place]           [] Phone: [phone]   Fax: [phone]  [place]           [x] Phone: [phone]   Fax: [phone]        Physical Therapy Daily Treatment Note  Date:  2021    Patient Name:  Viola Berger.    :  1945  MRN: [id number]  Restrictions/Precautions:  WBAT R  LE  Diagnosis:   Diagnosis: R TKA 21  Date of Injury/Surgery:   Treatment Diagnosis:    R knee stiffness/impaired mobility  Insurance/Certification information: PT Insurance Information: Medicare advantage   Referring Physician:  Referring Practitioner: Floyd Lama Doctor Visit:    Plan of care signed (Y/N):    Outcome Measure: TUG 12.5 sec;  6 STS with arms across chest in 30 sec  Visit# / total visits: 5/10 then PN - plan expires 6/15/21  Pain level: 3-410  R knee   Goals:          Long term goals  Time Frame for Long term goals : 6 weeks 6/15/21  Long term goal 1: patient's goal -get well  Long term goal 2: patient will complete TUG test in 9 sec without cane  Long term goal 3: 10 STS with hands across chext in  30 sec indicating increase leg strength  Long term goal 4: patient will demonstrate no greater than -5* R knee EXT which will enable patient to walk without a limp    Summary of Evaluation: ASSESSMENT  Patient primary complaints: R knee stiffness/impaired mobility  History of condition:R  TKA 21- 1 night stay- then home- difficulty sleeping ei er in bed/couch  Current functional limitations:  difficulty sleeping either in bed/couch  Clinical findings:TUG 12.5 sec;  6 STS with arms across chest in 30 sec; R knee * FLEX to -10* EXT; I SLR - ambulates with cane for balance  PLOF:retired, lives alone; independent with all necessary ADL/IADL  Skilled PT interventions are intended to:increase R knee ROM  which will enable patient  to improve quality of life  Patient agrees with established plan of care and assisted in the development of their  goals  Barriers Therapeutic Exercise  [] Modalities:  [x] Therapeutic Activity     [] Ultrasound  [] Estim  [x] Gait Training      [] Cervical Traction [] Lumbar Traction  [x] Neuromuscular Re-education    [] Cold/hotpack [] Iontophoresis   [x] Instruction in HEP      [x] Vasopneumatic   [] Dry Needling    [x] Manual Therapy               [] Aquatic Therapy              Electronically signed by:  Melyssa Camarillo 5/18/2021, 9:11 AM

## 2021-05-20 ENCOUNTER — HOSPITAL ENCOUNTER (OUTPATIENT)
Dept: PHYSICAL THERAPY | Age: 76
Setting detail: THERAPIES SERIES
Discharge: HOME | End: 2021-05-20
Payer: MEDICARE

## 2021-05-20 PROCEDURE — 97016 VASOPNEUMATIC DEVICE THERAPY: CPT

## 2021-05-20 PROCEDURE — 97110 THERAPEUTIC EXERCISES: CPT

## 2021-05-20 NOTE — FLOWSHEET NOTE
Outpatient Physical Therapy  [place]           [] Phone: [phone]   Fax: [phone]  [facility]           [x] Phone: [phone]   Fax: [phone]        Physical Therapy Daily Treatment Note  Date:  2021    Patient Name:  Marylen Ditch.    :  1945  MRN: [id number]  Restrictions/Precautions:  WBAT R  LE  Diagnosis:   Diagnosis: R TKA 21  Date of Injury/Surgery:   Treatment Diagnosis:    R knee stiffness/impaired mobility  Insurance/Certification information: PT Insurance Information: Medicare advantage   Referring Physician:  Referring Practitioner: Nigel Lama Doctor Visit:    Plan of care signed (Y/N):    Outcome Measure: TUG 12.5 sec;  6 STS with arms across chest in 30 sec  Visit# / total visits: 5/10 then PN - plan expires 6/15/21  Pain level: 0/10  R knee   Goals:          Long term goals  Time Frame for Long term goals : 6 weeks 6/15/21  Long term goal 1: patient's goal -get well  Long term goal 2: patient will complete TUG test in 9 sec without cane  Long term goal 3: 10 STS with hands across chext in  30 sec indicating increase leg strength  Long term goal 4: patient will demonstrate no greater than -5* R knee EXT which will enable patient to walk without a limp    Summary of Evaluation: ASSESSMENT  Patient primary complaints: R knee stiffness/impaired mobility  History of condition:R  TKA 21- 1 night stay- then home- difficulty sleeping eith er in bed/couch  Current functional limitations:  difficulty sleeping either in bed/couch  Clinical findings:TUG 12.5 sec;  6 STS with arms across chest in 30 sec; R knee * FLEX to -10* EXT; I SLR - ambulates with cane for balance  PLOF:retired, lives alone; independent with all necessary ADL/IADL  Skilled PT interventions are intended to:increase R knee ROM  which will enable patient  to improve quality of life  Patient agrees with established plan of care and assisted in the development of their  goals  Barriers to 3-4/10 after treatment.         Plan for Next Session:        Time In / Time Out:   1428/ 1510    Timed Code/Total Treatment Minutes:  42  10vaso 32te      Next Progress Note due:        Plan of Care Interventions:  [x] Therapeutic Exercise  [] Modalities:  [x] Therapeutic Activity     [] Ultrasound  [] Estim  [x] Gait Training      [] Cervical Traction [] Lumbar Traction  [x] Neuromuscular Re-education    [] Cold/hotpack [] Iontophoresis   [x] Instruction in HEP      [x] Vasopneumatic   [] Dry Needling    [x] Manual Therapy               [] Aquatic Therapy              Electronically signed by:  Michi Lora 5/20/2021, 2:21 PM

## 2021-05-24 ENCOUNTER — HOSPITAL ENCOUNTER (OUTPATIENT)
Dept: PHYSICAL THERAPY | Age: 76
Setting detail: THERAPIES SERIES
Discharge: HOME | End: 2021-05-24
Payer: MEDICARE

## 2021-05-24 PROCEDURE — 97110 THERAPEUTIC EXERCISES: CPT

## 2021-05-24 NOTE — FLOWSHEET NOTE
Outpatient Physical Therapy  [place]           [] Phone: [phone]   Fax: [phone]  Sah[facility] Morgan           [x] Phone: [phone]   Fax: [phone]        Physical Therapy Daily Treatment Note  Date:  2021    Patient Name:  Joon Castro.    :  1945  MRN: [id number]  Restrictions/Precautions:  WBAT R  LE  Diagnosis:   Diagnosis: R TKA 21  Date of Injury/Surgery:   Treatment Diagnosis:    R knee stiffness/impaired mobility  Insurance/Certification information: PT Insurance Information: Medicare advantage   Referring Physician:  Referring Practitioner: Donna Lama Doctor Visit:    Plan of care signed (Y/N):    Outcome Measure: TUG 12.5 sec;  6 STS with arms across chest in 30 sec  Visit# / total visits: 6/10 then PN - plan expires 6/15/21  Pain level: 0/10  R knee   Goals:          Long term goals  Time Frame for Long term goals : 6 weeks 6/15/21  Long term goal 1: patient's goal -get well  Long term goal 2: patient will complete TUG test in 9 sec without cane  Long term goal 3: 10 STS with hands across chext in  30 sec indicating increase leg strength  Long term goal 4: patient will demonstrate no greater than -5* R knee EXT which will enable patient to walk without a limp    Summary of Evaluation: ASSESSMENT  Patient primary complaints: R knee stiffness/impaired mobility  History of condition:R  TKA 21- 1 night stay- then home- difficulty sleeping eith er in bed/couch  Current functional limitations:  difficulty sleeping either in bed/couch  Clinical findings:TUG 12.5 sec;  6 STS with arms across chest in 30 sec; R knee * FLEX to -10* EXT; I SLR - ambulates with cane for balance  PLOF:retired, lives alone; independent with all necessary ADL/IADL  Skilled PT interventions are intended to:increase R knee ROM  which will enable patient  to improve quality of life  Patient agrees with established plan of care and assisted in the development of their  goals  Barriers to learning:none- no mental/cognitive barriers observed  Preferred learning style(s):   written- demonstration -practice  Preferred Language: English  Potential barriers to progress:none  The patient appears motivated to participate in PT : yes      Subjective: Knee is sore this morning from walking around a lot at [facility] yesterday. Thinks that he walked for about 1-2 hours      Any changes in Ambulatory Summary Sheet? None        Objective:  13 STS in 30 seconds  122* knee flexion   COVID screening questions were asked and patient attested that there had been no contact or symptoms        Exercises: (No more than 4 columns)   Exercise/Equipment 5/18/2021 5/20/2021 5/24/2021           WARM UP      Bike  Seat 4, 7 min   Lev 5 Seat 4, 7 min   Lev 5 Nustep 10 min    TABLE      Heel slides 10x10\" no strap 10x10\" no strap 10x10\" no strap   SAQ Med/Small Roll  15x5\" Med/Small roll  20x5\" Med/Small roll  20x5\"   QS with towel roll 10x10\" 10x10\" 10x10\"   Heel prop 1/2 roll 4' 1/2 roll 4 min  1/2 roll 4 min             STANDING      HC stretch Wedge 2x3\" Wedge 2x30\" Wedge 2x30\"   Step ups 4\" 2x10 4\" 2x10 6\" 20x    3-way SLR 10x ea way 15x ea way 20x ea way         Step Overs 2\" 2x10 2x10 2x10   STS   30\"              PROPRIOCEPTION                                    MODALITIES                      Other Therapeutic Activities/Education:        Home Exercise Program:  Continue with pre op HEP      Manual Treatments:        Modalities:Patient received vasocompression on their R knee for pain and inflammation for 10 min on low pressure. Patient had negative skin reaction afterwards. Communication with other providers:        Assessment:  (Response towards treatment session) (Pain Rating) Patient rated his knee pain 2/10 after treatment.         Plan for Next Session:        Time In / Time Out:  1120/1200    Timed Code/Total Treatment Minutes:  36' TE    Next Progress Note due:        Plan of Care Interventions:  [x] Therapeutic Exercise  [] Modalities:  [x] Therapeutic Activity     [] Ultrasound  [] Estim  [x] Gait Training      [] Cervical Traction [] Lumbar Traction  [x] Neuromuscular Re-education    [] Cold/hotpack [] Iontophoresis   [x] Instruction in HEP      [x] Vasopneumatic   [] Dry Needling    [x] Manual Therapy               [] Aquatic Therapy              Electronically signed by:  Samara Romberg 5/24/2021, 11:19 AM

## 2021-05-28 ENCOUNTER — HOSPITAL ENCOUNTER (OUTPATIENT)
Dept: PHYSICAL THERAPY | Age: 76
Setting detail: THERAPIES SERIES
Discharge: HOME | End: 2021-05-28
Payer: MEDICARE

## 2021-05-28 PROCEDURE — 97110 THERAPEUTIC EXERCISES: CPT

## 2021-05-28 NOTE — FLOWSHEET NOTE
Outpatient Physical Therapy  [place]           [] Phone: [phone]   Fax: [phone]  [place]           [x] Phone: [phone]   Fax: [phone]        Physical Therapy Daily Treatment Note  Date:  2021    Patient Name:  Lisa Beach.    :  1945  MRN: [id number]  Restrictions/Precautions:  WBAT R  LE  Diagnosis:   Diagnosis: R TKA 21  Date of Injury/Surgery:   Treatment Diagnosis:    R knee stiffness/impaired mobility  Insurance/Certification information: PT Insurance Information: Medicare advantage   Referring Physician:  Referring Practitioner: Claire Lama Doctor Visit:    Plan of care signed (Y/N):    Outcome Measure: TUG 12.5 sec;  6 STS with arms across chest in 30 sec  Visit# / total visits: 7/10 then PN - plan expires 6/15/21  Pain level: 1/10  R knee   Goals:          Long term goals  Time Frame for Long term goals : 6 weeks 6/15/21  Long term goal 1: patient's goal -get well  Long term goal 2: patient will complete TUG test in 9 sec without cane  Long term goal 3: 10 STS with hands across chext in  30 sec indicating increase leg strength  Long term goal 4: patient will demonstrate no greater than -5* R knee EXT which will enable patient to walk without a limp    Summary of Evaluation: ASSESSMENT  Patient primary complaints: R knee stiffness/impaired mobility  History of condition:R  TKA 21- 1 night stay- then home- difficulty sleeping eith er in bed/couch  Current functional limitations:  difficulty sleeping either in bed/couch  Clinical findings:TUG 12.5 sec;  6 STS with arms across chest in 30 sec; R knee * FLEX to -10* EXT; I SLR - ambulates with cane for balance  PLOF:retired, lives alone; independent with all necessary ADL/IADL  Skilled PT interventions are intended to:increase R knee ROM  which will enable patient  to improve quality of life  Patient agrees with established plan of care and assisted in the development of their  goals  Barriers to learning:none- no mental/cognitive barriers observed  Preferred learning style(s):   written- demonstration -practice  Preferred Language: English  Potential barriers to progress:none  The patient appears motivated to participate in PT : yes      Subjective: Knee is sore this morning from walking around a lot at [facility] yesterday. Thinks that he walked for about 1-2 hours      Any changes in Ambulatory Summary Sheet? None        Objective:  14 STS in 30 seconds    COVID screening questions were asked and patient attested that there had been no contact or symptoms        Exercises: (No more than 4 columns)   Exercise/Equipment 5/20/2021 5/24/2021 5/28/2021           WARM UP      Bike  Seat 4, 7 min   Lev 5 Nustep 10 min  Seat 4, 7 min   Lev 5   TABLE      Heel slides 10x10\" no strap 10x10\" no strap 10x10\"   SAQ Med/Small roll  20x5\" Med/Small roll  20x5\" Med/Small roll  20x5\"   QS with towel roll 10x10\" 10x10\" 10x10\" w/half roll under heel   Heel prop 1/2 roll 4 min  1/2 roll 4 min  1/2 roll 5'            STANDING      HC stretch Wedge 2x30\" Wedge 2x30\" Wedge 2x30\"   Step ups 4\" 2x10 6\" 20x  6\" 20x R   3-way SLR 15x ea way 20x ea way 20x ea way         Step Overs 2x10 2x10 2\" 2x10 R   STS  30\" 30\"              PROPRIOCEPTION                                    MODALITIES                      Other Therapeutic Activities/Education:        Home Exercise Program:  Continue with pre op HEP      Manual Treatments:        Modalities:Patient received vasocompression on their R knee for pain and inflammation for 10 min on low pressure. Patient had negative skin reaction afterwards. Communication with other providers:        Assessment:  (Response towards treatment session) (Pain Rating) Patient rated his knee pain 1/10 after treatment.         Plan for Next Session:        Time In / Time Out:  1116/1151    Timed Code/Total Treatment Minutes:  35te    Next Progress Note due:        Plan of Care Interventions:  [x] Therapeutic Exercise  [] Modalities:  [x] Therapeutic Activity     [] Ultrasound  [] Estim  [x] Gait Training      [] Cervical Traction [] Lumbar Traction  [x] Neuromuscular Re-education    [] Cold/hotpack [] Iontophoresis   [x] Instruction in HEP      [x] Vasopneumatic   [] Dry Needling    [x] Manual Therapy               [] Aquatic Therapy              Electronically signed by:  Marisa Mckeon 5/28/2021, 11:16 AM

## 2021-06-01 ENCOUNTER — OFFICE VISIT (OUTPATIENT)
Dept: ORTHOPEDIC SURGERY | Age: 76
End: 2021-06-01

## 2021-06-01 ENCOUNTER — OFFICE VISIT (OUTPATIENT)
Dept: FAMILY MEDICINE CLINIC | Age: 76
End: 2021-06-01
Payer: MEDICARE

## 2021-06-01 VITALS
BODY MASS INDEX: 23.69 KG/M2 | SYSTOLIC BLOOD PRESSURE: 136 MMHG | WEIGHT: 146.8 LBS | OXYGEN SATURATION: 97 % | RESPIRATION RATE: 16 BRPM | DIASTOLIC BLOOD PRESSURE: 70 MMHG | HEART RATE: 92 BPM | TEMPERATURE: 97.6 F

## 2021-06-01 VITALS
HEART RATE: 76 BPM | HEIGHT: 66 IN | OXYGEN SATURATION: 98 % | RESPIRATION RATE: 15 BRPM | BODY MASS INDEX: 23.46 KG/M2 | WEIGHT: 146 LBS

## 2021-06-01 DIAGNOSIS — K59.00 CONSTIPATION, UNSPECIFIED CONSTIPATION TYPE: ICD-10-CM

## 2021-06-01 DIAGNOSIS — N40.1 BENIGN PROSTATIC HYPERPLASIA WITH URINARY FREQUENCY: ICD-10-CM

## 2021-06-01 DIAGNOSIS — R35.0 BENIGN PROSTATIC HYPERPLASIA WITH URINARY FREQUENCY: ICD-10-CM

## 2021-06-01 DIAGNOSIS — F32.A DEPRESSION, UNSPECIFIED DEPRESSION TYPE: Primary | ICD-10-CM

## 2021-06-01 DIAGNOSIS — Z71.89 ACP (ADVANCE CARE PLANNING): ICD-10-CM

## 2021-06-01 DIAGNOSIS — Z09 POSTOP CHECK: ICD-10-CM

## 2021-06-01 DIAGNOSIS — Z00.00 ROUTINE GENERAL MEDICAL EXAMINATION AT A HEALTH CARE FACILITY: ICD-10-CM

## 2021-06-01 DIAGNOSIS — Z96.651 STATUS POST RIGHT KNEE REPLACEMENT: ICD-10-CM

## 2021-06-01 DIAGNOSIS — Z96.651 S/P TOTAL KNEE ARTHROPLASTY, RIGHT: Primary | ICD-10-CM

## 2021-06-01 PROCEDURE — G0438 PPPS, INITIAL VISIT: HCPCS | Performed by: PHYSICIAN ASSISTANT

## 2021-06-01 PROCEDURE — 99024 POSTOP FOLLOW-UP VISIT: CPT | Performed by: ORTHOPAEDIC SURGERY

## 2021-06-01 PROCEDURE — 4040F PNEUMOC VAC/ADMIN/RCVD: CPT | Performed by: PHYSICIAN ASSISTANT

## 2021-06-01 PROCEDURE — 1123F ACP DISCUSS/DSCN MKR DOCD: CPT | Performed by: PHYSICIAN ASSISTANT

## 2021-06-01 RX ORDER — COVID-19 ANTIGEN TEST
KIT MISCELLANEOUS
COMMUNITY
End: 2021-07-02

## 2021-06-01 RX ORDER — ACETAMINOPHEN 500 MG
500 TABLET ORAL EVERY 6 HOURS PRN
COMMUNITY
End: 2021-07-02

## 2021-06-01 RX ORDER — FLUOXETINE HYDROCHLORIDE 20 MG/1
20 CAPSULE ORAL DAILY
Qty: 90 CAPSULE | Refills: 1 | Status: SHIPPED | OUTPATIENT
Start: 2021-06-01 | End: 2021-07-02 | Stop reason: SDUPTHER

## 2021-06-01 RX ORDER — TAMSULOSIN HYDROCHLORIDE 0.4 MG/1
0.4 CAPSULE ORAL DAILY
Qty: 90 CAPSULE | Refills: 1 | Status: SHIPPED | OUTPATIENT
Start: 2021-06-01 | End: 2021-07-02 | Stop reason: SDUPTHER

## 2021-06-01 ASSESSMENT — ENCOUNTER SYMPTOMS
BLOOD IN STOOL: 0
COLOR CHANGE: 0
BACK PAIN: 0
NAUSEA: 0
EYE DISCHARGE: 0
EYE REDNESS: 0
COLOR CHANGE: 0
ABDOMINAL PAIN: 0
CONSTIPATION: 0
SHORTNESS OF BREATH: 0
PHOTOPHOBIA: 0
EYE PAIN: 0
SHORTNESS OF BREATH: 0
CHEST TIGHTNESS: 0
VOMITING: 0
DIARRHEA: 0
WHEEZING: 0
CHEST TIGHTNESS: 0
RHINORRHEA: 0
SORE THROAT: 0
COUGH: 0

## 2021-06-01 ASSESSMENT — PATIENT HEALTH QUESTIONNAIRE - PHQ9
2. FEELING DOWN, DEPRESSED OR HOPELESS: 1
SUM OF ALL RESPONSES TO PHQ QUESTIONS 1-9: 2
1. LITTLE INTEREST OR PLEASURE IN DOING THINGS: 1
SUM OF ALL RESPONSES TO PHQ QUESTIONS 1-9: 2
SUM OF ALL RESPONSES TO PHQ9 QUESTIONS 1 & 2: 2
SUM OF ALL RESPONSES TO PHQ QUESTIONS 1-9: 2

## 2021-06-01 ASSESSMENT — LIFESTYLE VARIABLES
AUDIT-C TOTAL SCORE: INCOMPLETE
HOW OFTEN DO YOU HAVE A DRINK CONTAINING ALCOHOL: 0
HOW OFTEN DO YOU HAVE A DRINK CONTAINING ALCOHOL: NEVER
AUDIT TOTAL SCORE: INCOMPLETE

## 2021-06-01 NOTE — PROGRESS NOTES
6/1/2021   Chief Complaint   Patient presents with    Post-Op Check     right TKA DOS 4/19/21        History of Present Illness:                             Heydi Quinonez Sr. is a 66 y.o. male who returns today for follow-up of his right knee. He has been doing physical therapy advancing well with his activities. He continues to deal with some mild aching pain along the medial joint line and some mild swelling issues at the knee. He is walking well today and has been functioning well without the need for a cane. Patient returns to the office today for right TKA DOS 4/19/21. Pt states pain today is a 3/10 will increase with getting up from a sitting position. Pt states he is still doing physical therapy which Is helping with his pain. Pt states overall he feels he is doing well. He will have some clicking in his knee that he hears with movement but denies any increase pain. Medical History  Patient's medications, allergies, past medical, surgical, social and family histories were reviewed and updated as appropriate. Review of Systems:   Review of Systems   Constitutional: Negative for activity change and fever. Respiratory: Negative for chest tightness and shortness of breath. Cardiovascular: Negative for chest pain. Skin: Negative for color change. Neurological: Negative for dizziness. Psychiatric/Behavioral: The patient is not nervous/anxious. Examination:  General Exam:  Vitals: Pulse 76   Resp 15   Ht 5' 6\" (1.676 m)   Wt 146 lb (66.2 kg)   SpO2 98%   BMI 23.57 kg/m²    Physical Exam   Right Lower Extremity:    The incision is well-healed. No erythema, drainage, or induration. Mild resolving soft tissue swelling present throughout the soft tissues of the knee. Range of motion is present from full extension to 120 degrees of flexion. Calf is soft and nontender. Negative Homans sign.   Sensation and motor function is intact at the knee and ankle. Assessment and Plan    1. Right total knee replacement    The patient is healing well and making steady improvements in the knee with physical therapy. I have recommended continuing with rehabilitation as a home program.  The patient understands importance of continuing with regular and aggressive stretching exercises and understands how to continue advancing strengthening and endurance as a home program.    The patient will use over-the-counter medications as needed for pain. Follow-up in 6 weeks for repeat exam.    I have reassured him that is normal to still have some swelling and discomfort at the knee at this stage. I expect the mild clicking and swelling issues to resolve through the healing process. I explained to him that he is doing well for the point in the recovery that he is currently at. I expect him to be improved at the next visit.           Electronically signed by Bandar Birch MD on 6/1/2021 at 3:32 PM

## 2021-06-01 NOTE — PATIENT INSTRUCTIONS
Continue weight-bearing as tolerated. Continue range of motion exercises as instructed. Ice and elevate as needed. Tylenol or Motrin for pain.   continue to work with physical therapy   Follow up in 6 weeks

## 2021-06-01 NOTE — PROGRESS NOTES
Patient returns to the office today for right TKA DOS 4/19/21. Pt states pain today is a 3/10 will increase with getting up from a sitting position. Pt states he is still doing physical therapy which Is helping with his pain. Pt states overall he feels he is doing well. He will have some clicking in his knee that he hears with movement but denies any increase pain.
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
## 2021-06-01 NOTE — PATIENT INSTRUCTIONS
Advance Directives: Care Instructions  Overview  An advance directive is a legal way to state your wishes at the end of your life. It tells your family and your doctor what to do if you can't say what you want. There are two main types of advance directives. You can change them any time your wishes change. Living will. This form tells your family and your doctor your wishes about life support and other treatment. The form is also called a declaration. Medical power of . This form lets you name a person to make treatment decisions for you when you can't speak for yourself. This person is called a health care agent (health care proxy, health care surrogate). The form is also called a durable power of  for health care. If you do not have an advance directive, decisions about your medical care may be made by a family member, or by a doctor or a  who doesn't know you. It may help to think of an advance directive as a gift to the people who care for you. If you have one, they won't have to make tough decisions by themselves. Follow-up care is a key part of your treatment and safety. Be sure to make and go to all appointments, and call your doctor if you are having problems. It's also a good idea to know your test results and keep a list of the medicines you take. What should you include in an advance directive? Many states have a unique advance directive form. (It may ask you to address specific issues.) Or you might use a universal form that's approved by many states. If your form doesn't tell you what to address, it may be hard to know what to include in your advance directive. Use the questions below to help you get started. · Who do you want to make decisions about your medical care if you are not able to? · What life-support measures do you want if you have a serious illness that gets worse over time or can't be cured? · What are you most afraid of that might happen? (Maybe you're afraid of having pain, losing your independence, or being kept alive by machines.)  · Where would you prefer to die? (Your home? A hospital? A nursing home?)  · Do you want to donate your organs when you die? · Do you want certain Worship practices performed before you die? When should you call for help? Be sure to contact your doctor if you have any questions. Where can you learn more? Go to [email or web address] org and sign in to your [employer] account. Enter R264 in the [employer] box to learn more about \"Advance Directives: Care Instructions. \"     If you do not have an account, please click on the \"Sign Up Now\" link. Current as of: July 17, 2020               Content Version: 12.8  © 2006-2021 Healthwise, [employer]. Care instructions adapted under license by [facility] \[facility]\"". If you have questions about a medical condition or this instruction, always ask your healthcare professional. [place] [address] any warranty or liability for your use of this information. Learning About Medical Power of   What is a medical power of ? A medical power of , also called a durable power of  for health care, is one type of the legal forms called advance directives. It lets you name the person you want to make treatment decisions for you if you can't speak or decide for yourself. The person you choose is called your health care agent. This person is also called a health care proxy or health care surrogate. A medical power of  may be called something else in your state. How do you choose a health care agent? Choose your health care agent carefully. This person may or may not be a family member. Talk to the person before you make your final decision. Make sure he or she is comfortable with this responsibility. It's a good idea to choose someone who:  · Is at least 25years old.   · Knows you well and understands what makes life meaningful for you. · Understands your Shinto and moral values. · Will do what you want, not what he or she wants. · Will be able to make difficult choices at a stressful time. · Will be able to refuse or stop treatment, if that is what you would want, even if you could die. · Will be firm and confident with health professionals if needed. · Will ask questions to get needed information. · Lives near you or agrees to travel to you if needed. Your family may help you make medical decisions while you can still be part of that process. But it's important to choose one person to be your health care agent in case you aren't able to make decisions for yourself. If you don't fill out the legal form and name a health care agent, the decisions your family can make may be limited. A health care agent may be called something else in your state. Who will make decisions for you if you don't have a health care agent? If you don't have a health care agent or a living will, you may not get the care you want. Decisions may be made by family members who disagree about your medical care. Or decisions may be made by a medical professional who doesn't know you well. In some cases, a  makes the decisions. When you name a health care agent, it is very clear who has the power to make health decisions for you. How do you name a health care agent? You name your health care agent on a legal form. This form is usually called a medical power of . Ask your hospital, state bar association, or office on aging where to find these forms. You must sign the form to make it legal. Some states require you to get the form notarized. This means that a person called a  watches you sign the form and then he or she signs the form. Some states also require that two or more witnesses sign the form. Be sure to tell your family members and doctors who your health care agent is.   Where can you learn more? Go to [email or web address] and sign in to your [employer] account. Enter [id number] in the [facility] box to learn more about \"Learning About [address]. \"     If you do not have an account, please click on the \"Sign Up Now\" link. Current as of: July 17, 2020               Content Version: 12.8  © 2006-2021 [employer]. Care instructions adapted under license by [facility] ([facility]). If you have questions about a medical condition or this instruction, always ask your healthcare professional. [address] any warranty or liability for your use of this information. Learning About Living Jennifer Bryan  What is a living will? A living will, also called a declaration, is a legal form. It tells your family and your doctor your wishes when you can't speak for yourself. It's used by the health professionals who will treat you as you near the end of your life or if you get seriously hurt or ill. If you put your wishes in writing, your loved ones and others will know what kind of care you want. They won't need to guess. This can ease your mind and be helpful to others. And you can change or cancel your living will at any time. A living will is not the same as an estate or property will. An estate will explains what you want to happen with your money and property after you die. How do you use it? A living will is used to describe the kinds of treatment or life support you want as you near the end of your life or if you get seriously hurt or ill. Keep these facts in mind about living lara. · Your living will is used only if you can't speak or make decisions for yourself. Most often, one or more doctors must certify that you can't speak or decide for yourself before your living will takes effect. · If you get better and can speak for yourself again, you can accept or refuse any treatment.  It doesn't matter what you said in your living will. · Some states may limit your right to refuse treatment in certain cases. For example, you may need to clearly state in your living will that you don't want artificial hydration and nutrition, such as being fed through a tube. Is a living will a legal document? A living will is a legal document. Each state has its own laws about living lara. And a living will may be called something else in your state. Here are some things to know about living lara. · You don't need an  to complete a living will. But legal advice can be helpful if your state's laws are unclear. It can also help if your health history is complicated or your family can't agree on what should be in your living will. · You can change your living will at any time. Some people find that their wishes about end-of-life care change as their health changes. If you make big changes to your living will, complete a new form. · If you move to another state, make sure that your living will is legal in the state where you now live. In most cases, doctors will respect your wishes even if you have a form from a different state. · You might use a universal form that has been approved by many states. This kind of form can sometimes be filled out and stored online. Your digital copy will then be available wherever you have a connection to the internet. The doctors and nurses who need to treat you can find it right away. · Your state may offer an online registry. This is another place where you can store your living will online. · It's a good idea to get your living will notarized. This means using a person called a  to watch two people sign, or witness, your living will. What should you know when you create a living will? Here are some questions to ask yourself as you make your living will:  · Do you know enough about life support methods that might be used?  If not, talk to your doctor so you know what might be done if you Personalized Preventive Plan for Cate Ruiz Sr. - 6/1/2021  Medicare offers a range of preventive health benefits. Some of the tests and screenings are paid in full while other may be subject to a deductible, co-insurance, and/or copay. Some of these benefits include a comprehensive review of your medical history including lifestyle, illnesses that may run in your family, and various assessments and screenings as appropriate. After reviewing your medical record and screening and assessments performed today your provider may have ordered immunizations, labs, imaging, and/or referrals for you. A list of these orders (if applicable) as well as your Preventive Care list are included within your After Visit Summary for your review. Other Preventive Recommendations:    · A preventive eye exam performed by an eye specialist is recommended every 1-2 years to screen for glaucoma; cataracts, macular degeneration, and other eye disorders. · A preventive dental visit is recommended every 6 months. · Try to get at least 150 minutes of exercise per week or 10,000 steps per day on a pedometer . · Order or download the FREE \"Exercise & Physical Activity: Your Everyday Guide\" from [employer]. Call 3-228.948.4193 or search [employer] online. · You need 6007-4469 mg of calcium and 4358-7930 IU of vitamin D per day. It is possible to meet your calcium requirement with diet alone, but a vitamin D supplement is usually necessary to meet this goal.  · When exposed to the sun, use a sunscreen that protects against both UVA and UVB radiation with an SPF of 30 or greater. Reapply every 2 to 3 hours or after sweating, drying off with a towel, or swimming. · Always wear a seat belt when traveling in a car. Always wear a helmet when riding a bicycle or motorcycle.

## 2021-06-02 ENCOUNTER — HOSPITAL ENCOUNTER (OUTPATIENT)
Dept: PHYSICAL THERAPY | Age: 76
Setting detail: THERAPIES SERIES
Discharge: HOME | End: 2021-06-02
Payer: MEDICARE

## 2021-06-02 PROCEDURE — 97110 THERAPEUTIC EXERCISES: CPT

## 2021-06-02 PROCEDURE — 97530 THERAPEUTIC ACTIVITIES: CPT

## 2021-06-02 NOTE — FLOWSHEET NOTE
learning:none- no mental/cognitive barriers observed  Preferred learning style(s):   written- demonstration -practice  Preferred Language: English  Potential barriers to progress:none  The patient appears motivated to participate in PT : yes      Subjective:  Patient reports of 1/10 pain today upon arrival describing it as an ache in the knee possibly due to the weather. Any changes in Ambulatory Summary Sheet? None        Objective:  13 STS in 30 seconds  -3* Ext after stretch    COVID screening questions were asked and patient attested that there had been no contact or symptoms        Exercises: (No more than 4 columns)   Exercise/Equipment 5/20/2021 5/24/2021 5/28/2021 6/2/2021            WARM UP       Bike  Seat 4, 7 min   Lev 5 Nustep 10 min  Seat 4, 7 min   Lev 5 Seat 4  Lv5  9'   TABLE       Heel slides 10x10\" no strap 10x10\" no strap 10x10\" 10x10\"   SAQ Med/Small roll  20x5\" Med/Small roll  20x5\" Med/Small roll  20x5\" Med/Small roll  20x5\"   QS with towel roll 10x10\" 10x10\" 10x10\" w/half roll under heel 10x10\" w/small towel roll   Heel prop 1/2 roll 4 min  1/2 roll 4 min  1/2 roll 5' Small roll 5' w/slight over pressure the last minute             STANDING       HC stretch Wedge 2x30\" Wedge 2x30\" Wedge 2x30\" Wedge 2x30\"   Step ups 4\" 2x10 6\" 20x  6\" 20x R 6\" 20x R   3-way SLR 15x ea way 20x ea way 20x ea way YTB  20x ea way Jurgen          Step Overs 2x10 2x10 2\" 2x10 R 2\" 2x10 R   STS  30\" 30\" 30\"               PROPRIOCEPTION                                          MODALITIES                         Other Therapeutic Activities/Education:        Home Exercise Program:  Continue with pre op HEP      Manual Treatments:        Modalities:Patient received vasocompression on their R knee for pain and inflammation for 10 min on low pressure. Patient had negative skin reaction afterwards.   - Not today       Communication with other providers:        Assessment:  (Response towards treatment session) (Pain

## 2021-06-04 ENCOUNTER — HOSPITAL ENCOUNTER (OUTPATIENT)
Dept: PHYSICAL THERAPY | Age: 76
Setting detail: THERAPIES SERIES
Discharge: HOME | End: 2021-06-04
Payer: MEDICARE

## 2021-06-04 PROCEDURE — 97530 THERAPEUTIC ACTIVITIES: CPT

## 2021-06-04 PROCEDURE — 97110 THERAPEUTIC EXERCISES: CPT

## 2021-06-04 NOTE — FLOWSHEET NOTE
Outpatient Physical Therapy  [place]           [] Phone: [phone]   Fax: [phone]  [facility]           [x] Phone: [phone]   Fax: [phone]        Physical Therapy Daily Treatment Note  Date:  2021    Patient Name:  Javier Topete    :  1945  MRN: [id number]  Restrictions/Precautions:  WBAT R  LE  Diagnosis:   Diagnosis: R TKA 21  Date of Injury/Surgery:   Treatment Diagnosis:    R knee stiffness/impaired mobility  Insurance/Certification information: PT Insurance Information: Medicare advantage   Referring Physician:  Referring Practitioner: Dawn Lama Doctor Visit:    Plan of care signed (Y/N):    Outcome Measure: TUG 12.5 sec;  6 STS with arms across chest in 30 sec  Visit# / total visits: 10/10 then PN - plan expires 6/15/21  Pain level: 1/10  R knee   Goals:          Long term goals  Time Frame for Long term goals : 6 weeks 6/15/21  Long term goal 1: patient's goal -get well  Long term goal 2: patient will complete TUG test in 9 sec without cane  Long term goal 3: 10 STS with hands across chext in  30 sec indicating increase leg strength  Long term goal 4: patient will demonstrate no greater than -5* R knee EXT which will enable patient to walk without a limp    Summary of Evaluation: ASSESSMENT  Patient primary complaints: R knee stiffness/impaired mobility  History of condition:R  TKA 21- 1 night stay- then home- difficulty sleeping ei er in bed/couch  Current functional limitations:  difficulty sleeping either in bed/couch  Clinical findings:TUG 12.5 sec;  6 STS with arms across chest in 30 sec; R knee * FLEX to -10* EXT; I SLR - ambulates with cane for balance  PLOF:retired, lives alone; independent with all necessary ADL/IADL  Skilled PT interventions are intended to:increase R knee ROM  which will enable patient  to improve quality of life  Patient agrees with established plan of care and assisted in the development of their  goals  Barriers to learning:none- no mental/cognitive barriers observed  Preferred learning style(s):   written- demonstration -practice  Preferred Language: English  Potential barriers to progress:none  The patient appears motivated to participate in PT : yes      Subjective:  Patient reports R knee feel stiff today    Any changes in Ambulatory Summary Sheet? None        Objective:  13.5 STS in 30 seconds  -3* Ext after stretch    COVID screening questions were asked and patient attested that there had been no contact or symptoms        Exercises: (No more than 4 columns)   Exercise/Equipment 5/24/2021 5/28/2021 6/2/2021 6/4/21            WARM UP       Bike  Nustep 10 min  Seat 4, 7 min   Lev 5 Seat 4  Lv5  9' Seat 4  12'   TABLE       Heel slides 10x10\" no strap 10x10\" 10x10\" 10x10\"   SAQ Med/Small roll  20x5\" Med/Small roll  20x5\" Med/Small roll  20x5\" FR x20  For 5\"   QS with towel roll 10x10\" 10x10\" w/half roll under heel 10x10\" w/small towel roll ---   Heel prop 1/2 roll 4 min  1/2 roll 5' Small roll 5' w/slight over pressure the last minute FR x9\"             STANDING       HC stretch Wedge 2x30\" Wedge 2x30\" Wedge 2x30\" x1.5'   Step ups 6\" 20x  6\" 20x R 6\" 20x R 4\" x15 wihtout UE support   3-way SLR 20x ea way 20x ea way YTB  20x ea way Jurgen next          Step Overs 2x10 2\" 2x10 R 2\" 2x10 R next   STS 30\" 30\" 30\" 30\"               PROPRIOCEPTION                                          MODALITIES                         Other Therapeutic Activities/Education:        Home Exercise Program:  Continue with pre op HEP      Manual Treatments:        Modalities:Patient received vasocompression on their R knee for pain and inflammation for 10 min on low pressure. Patient had negative skin reaction afterwards. - Not today       Communication with other providers:        Assessment:  (Response towards treatment session) (Pain Rating) Patient rated his knee pain 0/10 after treatment.         Plan for Next Session:        Time In / Time Out:   1426/1506    Timed Code/Total Treatment Minutes: 40 15ta 25te     Next Progress Note due:        Plan of Care Interventions:  [x] Therapeutic Exercise  [] Modalities:  [x] Therapeutic Activity     [] Ultrasound  [] Estim  [x] Gait Training      [] Cervical Traction [] Lumbar Traction  [x] Neuromuscular Re-education    [] Cold/hotpack [] Iontophoresis   [x] Instruction in HEP      [x] Vasopneumatic   [] Dry Needling    [x] Manual Therapy               [] Aquatic Therapy              Electronically signed by:  María Powers PT, 6/4/2021, 2:28 PM

## 2021-07-02 ENCOUNTER — OFFICE VISIT (OUTPATIENT)
Dept: FAMILY MEDICINE CLINIC | Age: 76
End: 2021-07-02
Payer: MEDICARE

## 2021-07-02 VITALS
WEIGHT: 151 LBS | SYSTOLIC BLOOD PRESSURE: 120 MMHG | HEART RATE: 78 BPM | DIASTOLIC BLOOD PRESSURE: 60 MMHG | TEMPERATURE: 96.9 F | OXYGEN SATURATION: 96 % | RESPIRATION RATE: 16 BRPM | BODY MASS INDEX: 24.37 KG/M2

## 2021-07-02 DIAGNOSIS — R35.0 BENIGN PROSTATIC HYPERPLASIA WITH URINARY FREQUENCY: ICD-10-CM

## 2021-07-02 DIAGNOSIS — F32.A DEPRESSION, UNSPECIFIED DEPRESSION TYPE: Primary | ICD-10-CM

## 2021-07-02 DIAGNOSIS — Z96.651 STATUS POST RIGHT KNEE REPLACEMENT: ICD-10-CM

## 2021-07-02 DIAGNOSIS — N40.1 BENIGN PROSTATIC HYPERPLASIA WITH URINARY FREQUENCY: ICD-10-CM

## 2021-07-02 PROCEDURE — G8427 DOCREV CUR MEDS BY ELIG CLIN: HCPCS | Performed by: PHYSICIAN ASSISTANT

## 2021-07-02 PROCEDURE — 4040F PNEUMOC VAC/ADMIN/RCVD: CPT | Performed by: PHYSICIAN ASSISTANT

## 2021-07-02 PROCEDURE — 99213 OFFICE O/P EST LOW 20 MIN: CPT | Performed by: PHYSICIAN ASSISTANT

## 2021-07-02 PROCEDURE — 1036F TOBACCO NON-USER: CPT | Performed by: PHYSICIAN ASSISTANT

## 2021-07-02 PROCEDURE — 1123F ACP DISCUSS/DSCN MKR DOCD: CPT | Performed by: PHYSICIAN ASSISTANT

## 2021-07-02 PROCEDURE — G8420 CALC BMI NORM PARAMETERS: HCPCS | Performed by: PHYSICIAN ASSISTANT

## 2021-07-02 RX ORDER — TAMSULOSIN HYDROCHLORIDE 0.4 MG/1
0.4 CAPSULE ORAL DAILY
Qty: 90 CAPSULE | Refills: 1 | Status: SHIPPED | OUTPATIENT
Start: 2021-07-02 | End: 2022-01-19 | Stop reason: SDUPTHER

## 2021-07-02 RX ORDER — FLUOXETINE HYDROCHLORIDE 20 MG/1
20 CAPSULE ORAL DAILY
Qty: 90 CAPSULE | Refills: 1 | Status: SHIPPED | OUTPATIENT
Start: 2021-07-02 | End: 2021-10-04 | Stop reason: DRUGHIGH

## 2021-07-02 ASSESSMENT — ENCOUNTER SYMPTOMS
COLOR CHANGE: 0
SORE THROAT: 0
SHORTNESS OF BREATH: 0
EYE PAIN: 0
COUGH: 0
CHEST TIGHTNESS: 0
BACK PAIN: 0
EYE REDNESS: 0
PHOTOPHOBIA: 0
EYE DISCHARGE: 0
DIARRHEA: 0
ABDOMINAL PAIN: 0
VOMITING: 0
WHEEZING: 0
NAUSEA: 0
RHINORRHEA: 0
BLOOD IN STOOL: 0
CONSTIPATION: 0

## 2021-07-02 NOTE — PROGRESS NOTES
Omar Tomlinson Sr. (:  1945) is a 66 y.o. male,Established patient, here for evaluation of the following chief complaint(s):    Follow-up      SUBJECTIVE/OBJECTIVE:  APRIL Anderson is a pleasant 66 y.o. male presenting to clinic today for 1 month follow-up depression. Depression - patient initiated on Prozac at last visit; patient reports that in the past month, he has noticed an overall improvement in his mood and outlook; patient does report that he has been eating more and has gained approximately 5 pounds in the past month. Patient reports overall his concentration and confusion has improved also; patient overall reports benefit and would like to continue with this medication for now; as noted previously patient has previously breakdown and is in the process of rebuilding which has been a slow process and has been contributing to his depression etc.    BPH - patient reports improvement with Flomax; denies any side effects or adverse effects. Constipation - improving slowly; drinking more water and improving diet etc. No longer taking colace. Knee pains -patient seen by orthopedics approximately 1 month ago for right knee replacement follow-up; patient was reassured at that time that symptoms were likely normal. Recommended continuation of over-the-counter pain medications. Patients last appointment with physical therapy was 2021. Patient has follow-up scheduled with orthopedics 2021. Patient reports overall, his knee pain has been improving since last month; patient reports some ongoing clicking and decreased range of motion.       Current Outpatient Medications   Medication Sig Dispense Refill    FLUoxetine (PROZAC) 20 MG capsule Take 1 capsule by mouth daily 90 capsule 1    tamsulosin (FLOMAX) 0.4 MG capsule Take 1 capsule by mouth daily 90 capsule 1    aspirin 325 MG EC tablet Take 1 tablet by mouth 2 times daily for 15 days 30 tablet 0    diphenhydrAMINE (ALLERGY) 25 MG tablet Take 25 mg by mouth every 6 hours as needed for Itching       No current facility-administered medications for this visit. Review of Systems   Constitutional: Negative for appetite change, chills, fatigue and fever. HENT: Negative for congestion, ear pain, hearing loss, rhinorrhea and sore throat. Eyes: Negative for photophobia, pain, discharge and redness. Respiratory: Negative for cough, chest tightness, shortness of breath and wheezing. Cardiovascular: Negative for chest pain, palpitations and leg swelling. Gastrointestinal: Negative for abdominal pain, blood in stool, constipation, diarrhea, nausea and vomiting. Endocrine: Negative for polyuria. Genitourinary: Negative for dysuria, flank pain, hematuria and urgency. Musculoskeletal: Positive for arthralgias. Negative for back pain, gait problem and joint swelling. Skin: Negative for color change and rash. Neurological: Negative for dizziness, syncope, weakness, light-headedness and headaches. Hematological: Negative for adenopathy. Psychiatric/Behavioral: Positive for confusion, decreased concentration and dysphoric mood. Negative for agitation, behavioral problems and suicidal ideas. The patient is not nervous/anxious. Physical Exam  Vitals and nursing note reviewed. Constitutional:       General: He is not in acute distress. Appearance: He is not ill-appearing. HENT:      Head: Normocephalic and atraumatic. Right Ear: Tympanic membrane and external ear normal.      Left Ear: Tympanic membrane and external ear normal.      Nose: No congestion or rhinorrhea. Mouth/Throat:      Mouth: Mucous membranes are moist.      Pharynx: No oropharyngeal exudate or posterior oropharyngeal erythema. Neck:      Vascular: No carotid bruit. Cardiovascular:      Rate and Rhythm: Normal rate. Pulses: Normal pulses.    Pulmonary:      Effort: Pulmonary effort is normal.   Abdominal: Palpations: Abdomen is soft. Musculoskeletal:         General: Tenderness: R knee with crepitus. Cervical back: Normal range of motion. No rigidity. Comments: Right knee decreased ROM   Skin:     General: Skin is warm and dry. Capillary Refill: Capillary refill takes less than 2 seconds. Neurological:      Mental Status: He is alert and oriented to person, place, and time. Mental status is at baseline. Psychiatric:         Mood and Affect: Mood normal.         ASSESSMENT/PLAN:  1. Depression, unspecified depression type   -Patient reports benefit from initiation of Prozac; will continue with current dosing. Patient denies any side effects. We will plan on following up in approximately 3 months for reevaluation and dose adjustments etc.  -     FLUoxetine (PROZAC) 20 MG capsule; Take 1 capsule by mouth daily, Disp-90 capsule, R-1Normal  2. Benign prostatic hyperplasia with urinary frequency   -Patient reports improvement with Flomax and denies any side effects or issues at this time. We will continue. Can plan on rechecking PSA at next visit when labs are drawn.  -     tamsulosin (FLOMAX) 0.4 MG capsule; Take 1 capsule by mouth daily, Disp-90 capsule, R-1Normal  3. Status post right knee replacement   -Patient has upcoming appointment scheduled with orthopedics; advised patient that there will likely be ongoing decreased range of motion with hardware in place etc.   -Continue with at home physical therapy exercises. Return in about 3 months (around 10/2/2021), or if symptoms worsen or fail to improve, for Follow Up. Can plan on rechecking labs at next visit. An electronic signature was used to authenticate this note.     --JORDAN Gordon

## 2021-07-13 ENCOUNTER — OFFICE VISIT (OUTPATIENT)
Dept: ORTHOPEDIC SURGERY | Age: 76
End: 2021-07-13

## 2021-07-13 VITALS
WEIGHT: 158 LBS | OXYGEN SATURATION: 96 % | BODY MASS INDEX: 25.39 KG/M2 | HEART RATE: 77 BPM | RESPIRATION RATE: 16 BRPM | HEIGHT: 66 IN

## 2021-07-13 DIAGNOSIS — Z09 POSTOP CHECK: ICD-10-CM

## 2021-07-13 DIAGNOSIS — Z96.651 S/P TOTAL KNEE ARTHROPLASTY, RIGHT: Primary | ICD-10-CM

## 2021-07-13 PROCEDURE — 99024 POSTOP FOLLOW-UP VISIT: CPT | Performed by: ORTHOPAEDIC SURGERY

## 2021-07-13 NOTE — PROGRESS NOTES
7/13/2021   Chief Complaint   Patient presents with    Post-Op Check     right TKA DOS 4/19/21        History of Present Illness:                             Marcella Crawford Sr. is a 66 y.o. male returns today for follow-up of his right total knee replacement. He is doing well with no significant issues today. His pain level is significantly improved and he is doing well with her walking long distances. Patient returns to the office today for s/p of the right TKA DOS 4/19/21. Pt states that pain today is a 0/10 pt states that [place] and he did notice some increase pain in the right knee. Pt states after a few days of rest he noticed decrease in pain. Medical History  Patient's medications, allergies, past medical, surgical, social and family histories were reviewed and updated as appropriate. Review of Systems                                            Examination:  General Exam:  Vitals: Pulse 77   Resp 16   Ht 5' 6\" (1.676 m)   Wt 158 lb (71.7 kg)   SpO2 96%   BMI 25.50 kg/m²    Physical Exam   Right Lower Extremity:    The incision is well-healed. No erythema, drainage, or induration. No soft tissue swelling or joint effusion. Range of motion is present from full extension to 120 degrees of flexion. Calf is soft and nontender. Negative Homans sign. Sensation and motor function is intact at the knee and ankle. Assessment and Plan  1. Right total knee replacement    Patient doing extremely well has resumed his normal activities. He has good strength and range of motion on exam today. Excellent alignment and stability is present. I have recommended he continue with his home exercise program.  We discussed the potential for continued improvements over time with his home exercise program.  Advance activities as tolerated and follow-up as needed if there are any issues or concerns in the future.         Electronically signed by Don Stahl

## 2021-07-13 NOTE — PROGRESS NOTES
Patient returns to the office today for s/p of the right TKA DOS 4/19/21. Pt states that pain today is a 0/10 pt states that he did [place] and he did notice some increase pain in the right knee. Pt states after a few days of rest he noticed decrease in pain.

## 2021-10-04 ENCOUNTER — OFFICE VISIT (OUTPATIENT)
Dept: FAMILY MEDICINE CLINIC | Age: 76
End: 2021-10-04
Payer: MEDICARE

## 2021-10-04 VITALS
RESPIRATION RATE: 16 BRPM | DIASTOLIC BLOOD PRESSURE: 70 MMHG | SYSTOLIC BLOOD PRESSURE: 130 MMHG | BODY MASS INDEX: 25.37 KG/M2 | HEART RATE: 78 BPM | TEMPERATURE: 98.1 F | WEIGHT: 157.2 LBS | OXYGEN SATURATION: 98 %

## 2021-10-04 DIAGNOSIS — R27.8 UNCOORDINATED MOVEMENTS: Primary | ICD-10-CM

## 2021-10-04 DIAGNOSIS — Z12.5 ENCOUNTER FOR SCREENING FOR MALIGNANT NEOPLASM OF PROSTATE: ICD-10-CM

## 2021-10-04 DIAGNOSIS — F32.A DEPRESSION, UNSPECIFIED DEPRESSION TYPE: ICD-10-CM

## 2021-10-04 DIAGNOSIS — N40.1 BENIGN PROSTATIC HYPERPLASIA WITH URINARY FREQUENCY: ICD-10-CM

## 2021-10-04 DIAGNOSIS — R35.0 BENIGN PROSTATIC HYPERPLASIA WITH URINARY FREQUENCY: ICD-10-CM

## 2021-10-04 DIAGNOSIS — R42 DIZZY SPELLS: ICD-10-CM

## 2021-10-04 DIAGNOSIS — Z11.59 NEED FOR HEPATITIS C SCREENING TEST: ICD-10-CM

## 2021-10-04 DIAGNOSIS — Z23 NEED FOR PROPHYLACTIC VACCINATION AND INOCULATION AGAINST VARICELLA: ICD-10-CM

## 2021-10-04 DIAGNOSIS — H69.83 DYSFUNCTION OF BOTH EUSTACHIAN TUBES: ICD-10-CM

## 2021-10-04 DIAGNOSIS — R41.3 MEMORY LOSS: ICD-10-CM

## 2021-10-04 PROCEDURE — 1123F ACP DISCUSS/DSCN MKR DOCD: CPT | Performed by: PHYSICIAN ASSISTANT

## 2021-10-04 PROCEDURE — G8427 DOCREV CUR MEDS BY ELIG CLIN: HCPCS | Performed by: PHYSICIAN ASSISTANT

## 2021-10-04 PROCEDURE — G8484 FLU IMMUNIZE NO ADMIN: HCPCS | Performed by: PHYSICIAN ASSISTANT

## 2021-10-04 PROCEDURE — 99214 OFFICE O/P EST MOD 30 MIN: CPT | Performed by: PHYSICIAN ASSISTANT

## 2021-10-04 PROCEDURE — 1036F TOBACCO NON-USER: CPT | Performed by: PHYSICIAN ASSISTANT

## 2021-10-04 PROCEDURE — G8417 CALC BMI ABV UP PARAM F/U: HCPCS | Performed by: PHYSICIAN ASSISTANT

## 2021-10-04 PROCEDURE — 4040F PNEUMOC VAC/ADMIN/RCVD: CPT | Performed by: PHYSICIAN ASSISTANT

## 2021-10-04 RX ORDER — FLUOXETINE HYDROCHLORIDE 40 MG/1
40 CAPSULE ORAL DAILY
Qty: 30 CAPSULE | Refills: 3 | Status: SHIPPED | OUTPATIENT
Start: 2021-10-04 | End: 2022-01-04

## 2021-10-04 RX ORDER — MECLIZINE HCL 12.5 MG/1
12.5 TABLET ORAL 3 TIMES DAILY PRN
Qty: 30 TABLET | Refills: 0 | Status: SHIPPED | OUTPATIENT
Start: 2021-10-04 | End: 2021-10-14

## 2021-10-04 RX ORDER — FLUTICASONE PROPIONATE 50 MCG
1 SPRAY, SUSPENSION (ML) NASAL DAILY
Qty: 16 G | Refills: 0 | Status: SHIPPED | OUTPATIENT
Start: 2021-10-04 | End: 2022-01-04

## 2021-10-04 ASSESSMENT — PATIENT HEALTH QUESTIONNAIRE - PHQ9
2. FEELING DOWN, DEPRESSED OR HOPELESS: 1
SUM OF ALL RESPONSES TO PHQ9 QUESTIONS 1 & 2: 2
SUM OF ALL RESPONSES TO PHQ QUESTIONS 1-9: 2
1. LITTLE INTEREST OR PLEASURE IN DOING THINGS: 1
SUM OF ALL RESPONSES TO PHQ QUESTIONS 1-9: 2
SUM OF ALL RESPONSES TO PHQ QUESTIONS 1-9: 2
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
## 2021-10-05 ASSESSMENT — ENCOUNTER SYMPTOMS
DIARRHEA: 0
RHINORRHEA: 0
COUGH: 0
BLOOD IN STOOL: 0
EYE REDNESS: 0
PHOTOPHOBIA: 0
ABDOMINAL PAIN: 0
CHEST TIGHTNESS: 0
EYE PAIN: 0
WHEEZING: 0
SHORTNESS OF BREATH: 0
NAUSEA: 0
CONSTIPATION: 0
SORE THROAT: 0
BACK PAIN: 0
VOMITING: 0
COLOR CHANGE: 0
EYE DISCHARGE: 0

## 2021-10-19 ENCOUNTER — NURSE ONLY (OUTPATIENT)
Dept: FAMILY MEDICINE CLINIC | Age: 76
End: 2021-10-19
Payer: MEDICARE

## 2021-10-19 VITALS — TEMPERATURE: 98.4 F

## 2021-10-19 PROCEDURE — 90756 CCIIV4 VACC ABX FREE IM: CPT | Performed by: PHYSICIAN ASSISTANT

## 2021-10-19 PROCEDURE — G0008 ADMIN INFLUENZA VIRUS VAC: HCPCS | Performed by: PHYSICIAN ASSISTANT

## 2022-01-04 ENCOUNTER — OFFICE VISIT (OUTPATIENT)
Dept: ORTHOPEDIC SURGERY | Age: 77
End: 2022-01-04
Payer: MEDICARE

## 2022-01-04 VITALS
HEART RATE: 76 BPM | WEIGHT: 157 LBS | HEIGHT: 66 IN | OXYGEN SATURATION: 98 % | RESPIRATION RATE: 15 BRPM | BODY MASS INDEX: 25.23 KG/M2

## 2022-01-04 DIAGNOSIS — Z96.651 S/P TOTAL KNEE ARTHROPLASTY, RIGHT: Primary | ICD-10-CM

## 2022-01-04 DIAGNOSIS — Z96.651 TOTAL KNEE REPLACEMENT STATUS, RIGHT: ICD-10-CM

## 2022-01-04 PROCEDURE — 1123F ACP DISCUSS/DSCN MKR DOCD: CPT | Performed by: ORTHOPAEDIC SURGERY

## 2022-01-04 PROCEDURE — 4040F PNEUMOC VAC/ADMIN/RCVD: CPT | Performed by: ORTHOPAEDIC SURGERY

## 2022-01-04 PROCEDURE — G8427 DOCREV CUR MEDS BY ELIG CLIN: HCPCS | Performed by: ORTHOPAEDIC SURGERY

## 2022-01-04 PROCEDURE — 1036F TOBACCO NON-USER: CPT | Performed by: ORTHOPAEDIC SURGERY

## 2022-01-04 PROCEDURE — G8482 FLU IMMUNIZE ORDER/ADMIN: HCPCS | Performed by: ORTHOPAEDIC SURGERY

## 2022-01-04 PROCEDURE — G8417 CALC BMI ABV UP PARAM F/U: HCPCS | Performed by: ORTHOPAEDIC SURGERY

## 2022-01-04 PROCEDURE — 99213 OFFICE O/P EST LOW 20 MIN: CPT | Performed by: ORTHOPAEDIC SURGERY

## 2022-01-04 NOTE — PATIENT INSTRUCTIONS
Continue weight-bearing as tolerated. Continue range of motion exercises as instructed. Ice and elevate as needed. Tylenol or Motrin for pain.   Follow up in 2 months  therapy ordered today

## 2022-01-05 ASSESSMENT — ENCOUNTER SYMPTOMS
SHORTNESS OF BREATH: 0
COLOR CHANGE: 0
CHEST TIGHTNESS: 0

## 2022-01-05 NOTE — PROGRESS NOTES
1/4/2022   Chief Complaint   Patient presents with    Knee Pain     right TKA DOS 4/19/21        History of Present Illness:                             Laura Bella Sr. is a 64 y.o. male who returns today for follow-up of his right knee. He has been concerned about difficulty with ambulation and balance issues. He has had a couple falls and stumbles. He attributes this to potential neurologic issue. He thinks that he is going to be seeing a neurologist soon. There is some concern about underlying neurologic dysfunction or possible history of stroke. He does have some intermittent aching pain at the knee worse with certain activities or prolonged standing or walking. Additionally he complains of occasional clicking sensations in the knee with active range of motion. Biggest concern for him currently is his lack of balance trouble with ambulation feeling like he will drop to the side or not have full control of his legs. Patient returns to the office toady for FU of the right TKA DOS 4/19/21. Pt states he has no pain today. Pt states he is concerned with his balance and when he walks he will drift to the right side. Pt states he has fallen due to being of balance. Medical History  Patient's medications, allergies, past medical, surgical, social and family histories were reviewed and updated as appropriate. Review of Systems   Constitutional: Negative for activity change and fever. Respiratory: Negative for chest tightness and shortness of breath. Cardiovascular: Negative for chest pain. Musculoskeletal: Positive for gait problem. Skin: Negative for color change. Neurological: Positive for weakness. Negative for dizziness. Psychiatric/Behavioral: The patient is not nervous/anxious.                                                 Examination:  General Exam:  Vitals: Pulse 76   Resp 15   Ht 5' 6\" (1.676 m)   Wt 157 lb (71.2 kg)   SpO2 98%   BMI 25.34 kg/m²    Physical Exam     Right lower extremity:  Well-healed midline scar at the knee. No swelling or joint effusion present. Normal alignment of the knee. No instability with stress examination or active range of motion. Range of motion is full from 0 to 120 degrees of flexion. Strength is 5 out of 5 with knee flexion and extension. Patient ambulates with a mild limp and has some decreased balance with ambulation. Sensation is intact to light touch throughout the lower extremity and foot. Diagnostic testing:  X-rays reviewed in office, I independently reviewed the films in the office today:     XR KNEE RIGHT (MIN 4 VIEWS)    Result Date: 1/4/2022  3 views of the right knee show well aligned total knee replacement. No evidence of fracture or loosening. No effusion or soft tissue swelling. Impression: Stable right total knee replacement      Office Procedures:  Orders Placed This Encounter   Procedures   [id number] [place]     Referral Priority:   Routine     Referral Type:   Eval and Treat     Referral Reason:   Specialty Services Required     Requested Specialty:   Physical Therapy     Number of Visits Requested:   1       Assessment and Plan  1. Previous right total knee replacement    2. Lower extremity weakness and difficulty with balance and ambulation    I have reassured the patient that on my exam and x-rays I believe his knee replacement is functioning well. I do have some concerns about underlying neurologic issues. I recommended that he follow-up and see a neurologist in order to diagnose any potential underlying neurologic condition. I feel he would benefit from additional physical therapy to work on strengthening and range of motion and balance and ambulation. Have sent a referral for physical therapy today.     I would like him to follow-up in 2 months for repeat evaluation after physical therapy and his neurology appointment        Electronically signed by Renee Jimenez MD on 1/5/2022 at 11:27 AM

## 2022-01-18 ENCOUNTER — OFFICE VISIT (OUTPATIENT)
Dept: NEUROLOGY | Age: 77
End: 2022-01-18
Payer: MEDICARE

## 2022-01-18 VITALS
DIASTOLIC BLOOD PRESSURE: 72 MMHG | OXYGEN SATURATION: 93 % | HEART RATE: 77 BPM | SYSTOLIC BLOOD PRESSURE: 124 MMHG | WEIGHT: 165.4 LBS | BODY MASS INDEX: 26.58 KG/M2 | HEIGHT: 66 IN

## 2022-01-18 DIAGNOSIS — E55.9 VITAMIN D DEFICIENCY: ICD-10-CM

## 2022-01-18 DIAGNOSIS — G31.84 MILD COGNITIVE IMPAIRMENT WITH MEMORY LOSS: Primary | ICD-10-CM

## 2022-01-18 DIAGNOSIS — Z77.29 CARBON MONOXIDE EXPOSURE: ICD-10-CM

## 2022-01-18 DIAGNOSIS — G60.9 IDIOPATHIC PERIPHERAL NEUROPATHY: ICD-10-CM
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
## 2022-01-18 PROCEDURE — G8417 CALC BMI ABV UP PARAM F/U: HCPCS | Performed by: STUDENT IN AN ORGANIZED HEALTH CARE EDUCATION/TRAINING PROGRAM

## 2022-01-18 PROCEDURE — 99205 OFFICE O/P NEW HI 60 MIN: CPT | Performed by: STUDENT IN AN ORGANIZED HEALTH CARE EDUCATION/TRAINING PROGRAM

## 2022-01-18 PROCEDURE — G8482 FLU IMMUNIZE ORDER/ADMIN: HCPCS | Performed by: STUDENT IN AN ORGANIZED HEALTH CARE EDUCATION/TRAINING PROGRAM

## 2022-01-18 PROCEDURE — 1123F ACP DISCUSS/DSCN MKR DOCD: CPT | Performed by: STUDENT IN AN ORGANIZED HEALTH CARE EDUCATION/TRAINING PROGRAM

## 2022-01-18 PROCEDURE — G8427 DOCREV CUR MEDS BY ELIG CLIN: HCPCS | Performed by: STUDENT IN AN ORGANIZED HEALTH CARE EDUCATION/TRAINING PROGRAM

## 2022-01-18 PROCEDURE — 1036F TOBACCO NON-USER: CPT | Performed by: STUDENT IN AN ORGANIZED HEALTH CARE EDUCATION/TRAINING PROGRAM

## 2022-01-18 PROCEDURE — 4040F PNEUMOC VAC/ADMIN/RCVD: CPT | Performed by: STUDENT IN AN ORGANIZED HEALTH CARE EDUCATION/TRAINING PROGRAM
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
## 2022-01-19 ENCOUNTER — TELEPHONE (OUTPATIENT)
Dept: FAMILY MEDICINE CLINIC | Age: 77
End: 2022-01-19

## 2022-01-19 ENCOUNTER — HOSPITAL ENCOUNTER (OUTPATIENT)
Age: 77
Discharge: HOME | End: 2022-01-19
Payer: MEDICARE

## 2022-01-19 DIAGNOSIS — R41.3 MEMORY LOSS: ICD-10-CM

## 2022-01-19 DIAGNOSIS — F32.A DEPRESSION, UNSPECIFIED DEPRESSION TYPE: Primary | ICD-10-CM

## 2022-01-19 DIAGNOSIS — N40.1 BENIGN PROSTATIC HYPERPLASIA WITH URINARY FREQUENCY: ICD-10-CM

## 2022-01-19 DIAGNOSIS — R35.0 BENIGN PROSTATIC HYPERPLASIA WITH URINARY FREQUENCY: ICD-10-CM

## 2022-01-19 LAB
ALBUMIN SERPL-MCNC: 4.2 GM/DL (ref 3.4–5)
ALP BLD-CCNC: 76 IU/L (ref 40–129)
ALT SERPL-CCNC: 13 U/L (ref 10–40)
ANION GAP SERPL CALCULATED.3IONS-SCNC: 10 MMOL/L (ref 4–16)
AST SERPL-CCNC: 21 IU/L (ref 15–37)
BASOPHILS ABSOLUTE: 0.1 K/CU MM
BASOPHILS RELATIVE PERCENT: 0.9 % (ref 0–1)
BILIRUB SERPL-MCNC: 0.9 MG/DL (ref 0–1)
BUN BLDV-MCNC: 16 MG/DL (ref 6–23)
CALCIUM SERPL-MCNC: 9.3 MG/DL (ref 8.3–10.6)
CHLORIDE BLD-SCNC: 102 MMOL/L (ref 99–110)
CO2: 24 MMOL/L (ref 21–32)
CREAT SERPL-MCNC: 1 MG/DL (ref 0.9–1.3)
DIFFERENTIAL TYPE: ABNORMAL
EOSINOPHILS ABSOLUTE: 0.1 K/CU MM
EOSINOPHILS RELATIVE PERCENT: 0.8 % (ref 0–3)
FOLATE: 15.7 NG/ML (ref 3.1–17.5)
GFR AFRICAN AMERICAN: >60 ML/MIN/1.73M2
GFR NON-AFRICAN AMERICAN: >60 ML/MIN/1.73M2
GLUCOSE BLD-MCNC: 92 MG/DL (ref 70–99)
HCT VFR BLD CALC: 50 % (ref 42–52)
HEMOGLOBIN: 16.4 GM/DL (ref 13.5–18)
IMMATURE NEUTROPHIL %: 0.4 % (ref 0–0.43)
LYMPHOCYTES ABSOLUTE: 2 K/CU MM
LYMPHOCYTES RELATIVE PERCENT: 26.1 % (ref 24–44)
MCH RBC QN AUTO: 31.4 PG (ref 27–31)
MCHC RBC AUTO-ENTMCNC: 32.8 % (ref 32–36)
MCV RBC AUTO: 95.8 FL (ref 78–100)
MONOCYTES ABSOLUTE: 0.9 K/CU MM
MONOCYTES RELATIVE PERCENT: 11.9 % (ref 0–4)
PDW BLD-RTO: 13.6 % (ref 11.7–14.9)
PLATELET # BLD: 260 K/CU MM (ref 140–440)
PMV BLD AUTO: 10.5 FL (ref 7.5–11.1)
POTASSIUM SERPL-SCNC: 4.5 MMOL/L (ref 3.5–5.1)
RBC # BLD: 5.22 M/CU MM (ref 4.6–6.2)
SEGMENTED NEUTROPHILS ABSOLUTE COUNT: 4.7 K/CU MM
SEGMENTED NEUTROPHILS RELATIVE PERCENT: 59.9 % (ref 36–66)
SODIUM BLD-SCNC: 136 MMOL/L (ref 135–145)
TOTAL IMMATURE NEUTOROPHIL: 0.03 K/CU MM
TOTAL PROTEIN: 6.6 GM/DL (ref 6.4–8.2)
TSH HIGH SENSITIVITY: 1.05 UIU/ML (ref 0.27–4.2)
VITAMIN B-12: 474.6 PG/ML (ref 211–911)
VITAMIN D 25-HYDROXY: 28.28 NG/ML
WBC # BLD: 7.8 K/CU MM (ref 4–10.5)

## 2022-01-19 PROCEDURE — 84443 ASSAY THYROID STIM HORMONE: CPT

## 2022-01-19 PROCEDURE — 80053 COMPREHEN METABOLIC PANEL: CPT

## 2022-01-19 PROCEDURE — 82607 VITAMIN B-12: CPT

## 2022-01-19 PROCEDURE — 36415 COLL VENOUS BLD VENIPUNCTURE: CPT

## 2022-01-19 PROCEDURE — 82746 ASSAY OF FOLIC ACID SERUM: CPT

## 2022-01-19 PROCEDURE — 82306 VITAMIN D 25 HYDROXY: CPT

## 2022-01-19 PROCEDURE — 84165 PROTEIN E-PHORESIS SERUM: CPT

## 2022-01-19 PROCEDURE — 85025 COMPLETE CBC W/AUTO DIFF WBC: CPT

## 2022-01-19 RX ORDER — FLUOXETINE HYDROCHLORIDE 20 MG/1
20 CAPSULE ORAL DAILY
Qty: 30 CAPSULE | Refills: 5 | Status: SHIPPED | OUTPATIENT
Start: 2022-01-19 | End: 2022-04-26

## 2022-01-19 RX ORDER — TAMSULOSIN HYDROCHLORIDE 0.4 MG/1
0.4 CAPSULE ORAL DAILY
Qty: 90 CAPSULE | Refills: 1 | Status: SHIPPED | OUTPATIENT
Start: 2022-01-19

## 2022-01-19 NOTE — TELEPHONE ENCOUNTER
Called and spoke to patient advised a prescription was called in for the fluoxetine should help with his memory patient verbalized understanding

## 2022-01-19 NOTE — TELEPHONE ENCOUNTER
Patient states he needs a refill on the medication that Arlette Sutton gave him for his memory he does not know the name of it. Aguilar Pressley.

## 2022-01-19 NOTE — TELEPHONE ENCOUNTER
Refill sent in for previously prescribed fluoxetine to help with memory/pseudodementia/depression etc.

## 2022-01-21 LAB
ALBUMIN ELP: 3.9 GM/DL (ref 3.2–5.6)
ALPHA-1-GLOBULIN: 0.3 GM/DL (ref 0.1–0.4)
ALPHA-2-GLOBULIN: 0.7 GM/DL (ref 0.4–1.2)
BETA GLOBULIN: 1 GM/DL (ref 0.5–1.3)
GAMMA GLOBULIN: 0.8 GM/DL (ref 0.5–1.6)
SPEP INTERPRETATION: NORMAL
TOTAL PROTEIN: 6.6 GM/DL (ref 6.4–8.2)

## 2022-01-27 ENCOUNTER — HOSPITAL ENCOUNTER (OUTPATIENT)
Dept: PHYSICAL THERAPY | Age: 77
Setting detail: THERAPIES SERIES
Discharge: HOME | End: 2022-01-27
Payer: MEDICARE

## 2022-01-27 PROCEDURE — 97110 THERAPEUTIC EXERCISES: CPT

## 2022-01-27 PROCEDURE — 97161 PT EVAL LOW COMPLEX 20 MIN: CPT

## 2022-01-27 NOTE — PLAN OF CARE
Outpatient Physical Therapy           [place]           [x] Phone: [phone]   Fax: [phone]  [place]           [] Phone: [phone]   Fax: [phone]     To: Referring Practitioner: Hanford Cranker, DO   From: Tad Jones, PT, DPT     Patient: Dolly Neely.       : 1945  Diagnosis: Diagnosis: Idiopathic peripheral neuropathy   Treatment Diagnosis: Treatment Diagnosis: impaired balance, impaired gait   Date: 2022    Physical Therapy Certification/Re-Certification Form  Dear Dr. Leeroy Wilkerson,  The following patient has been evaluated for physical therapy services and for therapy to continue, insurance requires physician review of the treatment plan initially and every 90 days. Please review the attached evaluation and/or summary of the patient's plan of care, and verify that you agree therapy should continue by signing the attached document and sending it back to our office. Assessment:    Assessment: Pt is a 68-year-old male who presents to therapy with worsening balance and gait over the last year. Upon assessment, pt does present with impaired balance, impaired gait, BLE weakness and overall reduced functional mobility independence which increased risk for falls and injury without therapy intervention. Pt would benefit from skilled therapy interventions to address listed impairments, progress toward goal completion and improve ADL/IADL status. PT also warranted to reduce risk for further injury or decline. Patient agrees with established plan of care and assisted in the development of their short term and long term goals. Patient had no adverse reaction with initial treatment and there are no barriers to learning. Learning preferences include demonstration, practice, and handouts. Patient expressed understanding of HEP and appears to be motivated to participate in an active PT program including compliance with HEP expectations.       Plan of Care/Treatment to date:  [x] Therapeutic Exercise  [x] Modalities:  [x] Therapeutic Activity     [] Ultrasound  [] Electrical Stimulation  [x] Gait Training      [] Cervical Traction [] Lumbar Traction  [x] Neuromuscular Re-education    [x] Cold/hotpack [] Iontophoresis   [x] Instruction in HEP      [] Vasopneumatic    [] Dry Needling  [x] Manual Therapy               [] Aquatic Therapy       Other:          Frequency/Duration:  # Days per week: [] 1 day # Weeks: [] 1 week [x] 5 weeks     [x] 2 days   [] 2 weeks [] 6 weeks     [] 3 days   [] 3 weeks [] 7 weeks     [] 4 days   [] 4 weeks [] 8 weeks         [] 9 weeks [] 10 weeks         [] 11 weeks [] 12 weeks    Rehab Potential/Progress: [] Excellent [x] Good [] Fair  [] Poor     Goals:    Patient goals : improve balance  Short term goals  Time Frame for Short term goals: 5 visits  Short term goal 1: Pt will be IND with HEP in order to maximize recovery outside of clinic  Long term goals  Time Frame for Long term goals : 10 visits  Long term goal 1: Pt will improve TINEO to 45 or more to show reduced fall risk and improved balance  Long term goal 2: Pt will improve TUG to 13 sec or less to show improved gait  Long term goal 3: Pt will improve 5x STS to 15 seconds or less to show improved ADL and mobilty status  Long term goal 4: Pt will improve BLE gross strength to 4+/5 or more to aide in balance and gait      Electronically signed by:  Elisabeth Santiago PT, DPT, 1/27/2022, 1:58 PM        If you have any questions or concerns, please don't hesitate to call.   Thank you for your referral.      Physician Signature:________________________________Date:_________ TIME: _____  By signing above, therapists plan is approved by physician

## 2022-01-27 NOTE — FLOWSHEET NOTE
Outpatient Physical Therapy  [place]           [x] Phone: [phone]   Fax: [phone]  [place]           [] Phone: [phone]   Fax: [phone]        Physical Therapy Daily Treatment Note  Date:  2022    Patient Name:  Michelle Cloud    :  1945  MRN: [id number]  Restrictions/Precautions:  none  Diagnosis:   Diagnosis: Idiopathic peripheral neuropathy  Date of Injury/Surgery:   Treatment Diagnosis: Treatment Diagnosis: impaired balance, impaired gait    Insurance/Certification information: PT Insurance Information: [facility]   Referring Physician:  Referring Practitioner: Juan Pablo Okeefe DO  Next Doctor Visit:    Plan of care signed (Y/N):  Sent   Outcome Measure: TU.31 seconds. 5x STS: 18.45 seconds BUE use. TINEO/56  Visit# / total visits:   1/10  Pain level: 0/10     Goals:     Patient goals : improve balance  Short term goals  Time Frame for Short term goals: 5 visits  Short term goal 1: Pt will be IND with HEP in order to maximize recovery outside of clinic  Long term goals  Time Frame for Long term goals : 10 visits  Long term goal 1: Pt will improve TINEO to 45 or more to show reduced fall risk and improved balance  Long term goal 2: Pt will improve TUG to 13 sec or less to show improved gait  Long term goal 3: Pt will improve 5x STS to 15 seconds or less to show improved ADL and mobilty status  Long term goal 4: Pt will improve BLE gross strength to 4+/5 or more to aide in balance and gait    Summary of Evaluation: Assessment: Pt is a 53-year-old male who presents to therapy with worsening balance and gait over the last year. Upon assessment, pt does present with impaired balance, impaired gait, BLE weakness and overall reduced functional mobility independence which increased risk for falls and injury without therapy intervention.  Pt would benefit from skilled therapy interventions to address listed impairments, progress toward goal completion and improve ADL/IADL status. PT also warranted to reduce risk for further injury or decline. Subjective:  See eval         Any changes in Ambulatory Summary Sheet? None        Objective:  See eval   COVID screening questions were asked and patient attested that there had been no contact or symptoms        Exercises: (No more than 4 columns)   Exercise/Equipment 1/27/22 #1 Date Date           WARM UP                     TABLE      marches x10 BL ecc focus     LAQ x10 BL ecc focus     Seated clams RTB x10                    STANDING      STS x10 ecc focus                                              PROPRIOCEPTION                                    MODALITIES                      Other Therapeutic Activities/Education:  HEP and importance of completion, POC and goals, anatomy and physiology related to condition      Home Exercise Program: Issued, practiced and pt demo ability to perform 1/27/2022      Manual Treatments:  none      Modalities:  none      Communication with other providers:  none      Assessment:  Pt tolerated today's treatment without any adverse reactions or complications this date. End pain: same    Assessment: Pt is a 40-year-old male who presents to therapy with worsening balance and gait over the last year. Upon assessment, pt does present with impaired balance, impaired gait, BLE weakness and overall reduced functional mobility independence which increased risk for falls and injury without therapy intervention. Pt would benefit from skilled therapy interventions to address listed impairments, progress toward goal completion and improve ADL/IADL status. PT also warranted to reduce risk for further injury or decline.       Plan for Next Session: Specific instructions for Next Treatment: nustep, BLE strength, BALANCE AND GAIT, turning, backwards gait and stepping      Time In / Time Out:   1303 - 1345     Timed Code/Total Treatment Minutes:  42': 11' TE x1, 31' Eval x1    Next Progress Note due:
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
## 2022-01-31 ENCOUNTER — HOSPITAL ENCOUNTER (OUTPATIENT)
Dept: PHYSICAL THERAPY | Age: 77
Discharge: HOME | End: 2022-01-31

## 2022-02-02 ENCOUNTER — HOSPITAL ENCOUNTER (OUTPATIENT)
Dept: PHYSICAL THERAPY | Age: 77
Setting detail: THERAPIES SERIES
Discharge: HOME | End: 2022-02-02
Payer: MEDICARE

## 2022-02-02 PROCEDURE — 97530 THERAPEUTIC ACTIVITIES: CPT

## 2022-02-02 PROCEDURE — 97110 THERAPEUTIC EXERCISES: CPT

## 2022-02-02 PROCEDURE — 97112 NEUROMUSCULAR REEDUCATION: CPT

## 2022-02-02 NOTE — FLOWSHEET NOTE
Outpatient Physical Therapy  [place]           [x] Phone: [phone]   Fax: [phone]  [facility]           [] Phone: [phone]   Fax: [phone]        Physical Therapy Daily Treatment Note  Date:  2022    Patient Name:  Zuleima Dennison    :  1945  MRN: [id number]  Restrictions/Precautions:  none  Diagnosis:   Diagnosis: Idiopathic peripheral neuropathy  Date of Injury/Surgery:   Treatment Diagnosis: Treatment Diagnosis: impaired balance, impaired gait    Insurance/Certification information: PT Insurance Information: [facility]    Referring Physician:  Referring Practitioner: Soyla Schlatter, DO  Next Doctor Visit:    Plan of care signed (Y/N):  Sent   Outcome Measure: TU.31 seconds. 5x STS: 18.45 seconds BUE use. TINEO/56  Visit# / total visits:   2/10  Pain level: 0/10     Goals:     Patient goals : improve balance  Short term goals  Time Frame for Short term goals: 5 visits  Short term goal 1: Pt will be IND with HEP in order to maximize recovery outside of clinic  Long term goals  Time Frame for Long term goals : 10 visits  Long term goal 1: Pt will improve TINEO to 45 or more to show reduced fall risk and improved balance  Long term goal 2: Pt will improve TUG to 13 sec or less to show improved gait  Long term goal 3: Pt will improve 5x STS to 15 seconds or less to show improved ADL and mobilty status  Long term goal 4: Pt will improve BLE gross strength to 4+/5 or more to aide in balance and gait    Summary of Evaluation: Assessment: Pt is a 25-year-old male who presents to therapy with worsening balance and gait over the last year. Upon assessment, pt does present with impaired balance, impaired gait, BLE weakness and overall reduced functional mobility independence which increased risk for falls and injury without therapy intervention.  Pt would benefit from skilled therapy interventions to address listed impairments, progress toward goal completion and improve ADL/IADL status. PT also warranted to reduce risk for further injury or decline. Subjective: Pt reports feeling very stiff, forgetful. HE has had a few falls        Any changes in Ambulatory Summary Sheet? None        Objective:   COVID screening questions were asked and patient attested that there had been no contact or symptoms    98% O2sats HR 46 /73    BPM 47<>52 throughout  tx  C/o SOB with activities   Unable to scoot body down on mat  Ambulation with slow deana, WBOS no Heel strike and toe off. Stiff hip no trunk rotation. Increased lateral sway No Ad    Exercises: (No more than 4 columns)   Exercise/Equipment 1/27/22 #1 2/2/22 #2 Date           WARM UP                     TABLE      marches x10 BL ecc focus HL x 10    LAQ x10 BL ecc focus x10 BL ecc focus    Seated clams RTB x10 RTB 2 x10     bridges  X 10    DKTC w SB  X 10    Add squeeze  X 10 3\"     SLR  X 10 ea. STANDING      STS x10 ecc focus X 10 eccentric focus                marches  X 10 B UE support    Toe taps  X 10 ea.    gait  Demo and perform heel-toe gait pattern in // bars                PROPRIOCEPTION      airex marches  X 10          Static balance  WBOS, EO/ EC NBOS, EO EC tandem    wobbleboard  X 10 taps ea. 30 s x 1 ea. MODALITIES                            Other Therapeutic Activities/Education:  HEP and importance of completion, POC and goals, anatomy and physiology related to condition      Home Exercise Program: Issued, practiced and pt demo ability to perform 1/27/2022      Manual Treatments:  none      Modalities:  none      Communication with other providers:  none      Assessment:  Pt tolerated today's treatment without any adverse reactions or complications this date with added exercises. Will updated HEP next visit. Patient reports multiple falls and recommended using AD. Challenged by balance activities.  Pt would continue to benefit from skilled therapy interventions to address

## 2022-02-07 ENCOUNTER — HOSPITAL ENCOUNTER (OUTPATIENT)
Dept: PHYSICAL THERAPY | Age: 77
Setting detail: THERAPIES SERIES
Discharge: HOME | End: 2022-02-07
Payer: MEDICARE

## 2022-02-07 PROCEDURE — 97110 THERAPEUTIC EXERCISES: CPT

## 2022-02-07 PROCEDURE — 97112 NEUROMUSCULAR REEDUCATION: CPT

## 2022-02-07 NOTE — FLOWSHEET NOTE
Outpatient Physical Therapy  [place]           [x] Phone: [phone]   Fax: [phone]  [facility]           [] Phone: [phone]   Fax: [phone]        Physical Therapy Daily Treatment Note  Date:  2022    Patient Name:  Kaleb Conner    :  1945  MRN: [id number]  Restrictions/Precautions:  none  Diagnosis:   Diagnosis: Idiopathic peripheral neuropathy  Date of Injury/Surgery:   Treatment Diagnosis: Treatment Diagnosis: impaired balance, impaired gait    Insurance/Certification information: PT Insurance Information: 5 [facility]    Referring Physician:  Referring Practitioner: Db November, DO  Next Doctor Visit:    Plan of care signed (Y/N):  Sent   Outcome Measure: TU.31 seconds. 5x STS: 18.45 seconds BUE use. TINEO/56  Visit# / total visits:   3/10  Pain level: 0/10     Goals:     Patient goals : improve balance  Short term goals  Time Frame for Short term goals: 5 visits  Short term goal 1: Pt will be IND with HEP in order to maximize recovery outside of clinic  Long term goals  Time Frame for Long term goals : 10 visits  Long term goal 1: Pt will improve TINEO to 45 or more to show reduced fall risk and improved balance  Long term goal 2: Pt will improve TUG to 13 sec or less to show improved gait  Long term goal 3: Pt will improve 5x STS to 15 seconds or less to show improved ADL and mobilty status  Long term goal 4: Pt will improve BLE gross strength to 4+/5 or more to aide in balance and gait    Summary of Evaluation: Assessment: Pt is a 39-year-old male who presents to therapy with worsening balance and gait over the last year. Upon assessment, pt does present with impaired balance, impaired gait, BLE weakness and overall reduced functional mobility independence which increased risk for falls and injury without therapy intervention.  Pt would benefit from skilled therapy interventions to address listed impairments, progress toward goal completion and improve ADL/IADL status. PT also warranted to reduce risk for further injury or decline. Subjective: No pain today and no falls since here last.  He is expressing concern about stepping BW and leg not coming with him correctly, studders. Any changes in Ambulatory Summary Sheet? None        Objective: COVID screening questions were asked and patient attested that there had been no contact or symptoms    97% O2sats HR 44          Unsteady w/ EC in tandem worse than foam.   HR elevates up to 81 w/ exercises, O2 stays above 95%    Exercises: (No more than 4 columns)   Exercise/Equipment 1/27/22 #1 2/2/22 #2 2/7/22  #3           WARM UP                     TABLE      marches x10 BL ecc focus HL x 10 H/L 20x ea LE   LAQ x10 BL ecc focus x10 BL ecc focus x10 BL ecc focus   Seated clams RTB x10 RTB 2 x10  RTB 15x ea LE   bridges  X 10 15x   DKTC w SB  X 10 15x    Add squeeze  X 10 3\" 10x5\"    SLR  X 10 ea. 10x ea LE    STANDING      STS x10 ecc focus X 10 eccentric focus X 10 eccentric focus               marches  X 10 B UE support airex 10x ea LE   Toe taps  X 10 ea. 4\" step 15x ea    gait  Demo and perform heel-toe gait pattern in // bars  FW heel/toe // bars               PROPRIOCEPTION      airex marches  X 10 Above          Static balance  WBOS, EO/ EC NBOS, EO EC tandem Tandem on foam 30\"x2 ea way, tandem EC 30\" ea    wobbleboard  X 10 taps ea. 30 s x 1 ea. 10 taps ea way  30\" ea way    BW walk   - // bars 3 laps    Ladder walks or obstacles. Next? ?   MODALITIES                            Other Therapeutic Activities/Education:  HEP and importance of completion, POC and goals, anatomy and physiology related to condition      Home Exercise Program: Issued, practiced and pt demo ability to perform 1/27/2022      Manual Treatments:  none      Modalities:  none      Communication with other providers:  none      Assessment:  Pt tolerated today's treatment without any adverse reactions or complications this date with added exercises. Pt min to mod challenged by balance activities. Pt would continue to benefit from skilled therapy interventions to address remaining impairments, improve mobility and strength and progress toward goal completion while reducing risk for re-injury or further decline. End pain: 0/10 pain stiff      Plan for Next Session:  nustep, BLE strength, BALANCE AND GAIT, turning, backwards gait and stepping    Will updated HEP next visit.       Time In / Time Out:   1255/1330    Timed Code/Total Treatment Minutes:  35/35    1TE 1NR    Next Progress Note due:  10th visit       Plan of Care Interventions:  [x] Therapeutic Exercise  [x] Modalities:  [x] Therapeutic Activity     [] Ultrasound  [] Estim  [x] Gait Training      [] Cervical Traction [] Lumbar Traction  [x] Neuromuscular Re-education    [x] Cold/hotpack [] Iontophoresis   [x] Instruction in HEP      [] Vasopneumatic   [] Dry Needling    [x] Manual Therapy               [] Aquatic Therapy              Electronically signed by:  Neal Tompkins, PT, PT, MPT, ATC  2/7/2022, 12:36 PM    2/7/2022, 1:34 PM

## 2022-02-17 ENCOUNTER — HOSPITAL ENCOUNTER (OUTPATIENT)
Dept: MRI IMAGING | Age: 77
Discharge: HOME | End: 2022-02-17
Payer: MEDICARE
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
## 2022-02-17 DIAGNOSIS — Z77.29 CARBON MONOXIDE EXPOSURE: ICD-10-CM

## 2022-02-17 DIAGNOSIS — G31.84 MILD COGNITIVE IMPAIRMENT WITH MEMORY LOSS: ICD-10-CM

## 2022-02-17 PROCEDURE — 70551 MRI BRAIN STEM W/O DYE: CPT

## 2022-02-28 NOTE — DISCHARGE SUMMARY
Outpatient Physical Therapy  [place]           [x] Phone: [phone]   Fax: [phone]  [facility]           [] Phone: [phone]   Fax: [phone]        Physical Therapy Daily Discharge Note  Date:  2022    Patient Name:  Curt Yoder.    :  1945  MRN: [id number]    Restrictions/Precautions:  none  Diagnosis:   Diagnosis: Idiopathic peripheral neuropathy  Date of Injury/Surgery:   Treatment Diagnosis: Treatment Diagnosis: impaired balance, impaired gait    Insurance/Certification information: PT Insurance Information: 5 [facility]     Referring Physician:  Referring Practitioner: Keira Petty, DO  Next Doctor Visit:    Plan of care signed (Y/N):  Y  Outcome Measure: TU.31 seconds. 5x STS: 18.45 seconds BUE use. TINEO/56  Visit# / total visits:   3/10  Pain level:      0/10          Goals:     Patient goals : improve balance  Short term goals  Time Frame for Short term goals: 5 visits  Short term goal 1: Pt will be IND with HEP in order to maximize recovery outside of clinic  Long term goals  Time Frame for Long term goals : 10 visits  Long term goal 1: Pt will improve TINEO to 45 or more to show reduced fall risk and improved balance  Long term goal 2: Pt will improve TUG to 13 sec or less to show improved gait  Long term goal 3: Pt will improve 5x STS to 15 seconds or less to show improved ADL and mobilty status  Long term goal 4: Pt will improve BLE gross strength to 4+/5 or more to aide in balance and gait     Summary of Evaluation: Assessment: Pt is a 64-year-old male who presents to therapy with worsening balance and gait over the last year. Upon assessment, pt does present with impaired balance, impaired gait, BLE weakness and overall reduced functional mobility independence which increased risk for falls and injury without therapy intervention.  Pt would benefit from skilled therapy interventions to address listed impairments, progress toward goal completion and improve ADL/IADL status. PT also warranted to reduce risk for further injury or decline. Objective:    Unable to complete an assessment of the patient and their progress towards their goals secondary to discontinuation of therapy. Timothy Blue last appointment was on 2/7/22. Pt called and cancelled all apt earlier this month due to illness and currently has no more scheduled. Communication with other providers:    Faxed Discharge note secondary to discontinuation of therapy sevices    Assessment:    [facility] has discontinued therapy services and at this time they will be discharged from our facility. If their is any future needs please don't hesitate to call our offices and resubmit a new therapy order. We appreciate your referral and letting us serve your patients.        Interventions PRN:  [x] Therapeutic Exercise  [] Modalities:  [x] Therapeutic Activity     [] Ultrasound  [] Estim  [x] Gait Training      [] Cervical Traction [] Lumbar Traction  [x] Neuromuscular Re-education    [] Cold/hotpack [] Iontophoresis   [x] Instruction in HEP      [] Vasopneumatic   [] Dry Needling    [x] Manual Therapy               [] Aquatic Therapy              Electronically signed by:    Teo Vee, PT,  DPT  ,  2/28/2022, 4:56 PM

## 2022-03-01 ENCOUNTER — OFFICE VISIT (OUTPATIENT)
Dept: ORTHOPEDIC SURGERY | Age: 77
End: 2022-03-01
Payer: MEDICARE

## 2022-03-01 VITALS — RESPIRATION RATE: 15 BRPM | WEIGHT: 165 LBS | HEIGHT: 66 IN | BODY MASS INDEX: 26.52 KG/M2

## 2022-03-01 DIAGNOSIS — Z96.651 S/P TOTAL KNEE ARTHROPLASTY, RIGHT: Primary | ICD-10-CM

## 2022-03-01 PROCEDURE — 4040F PNEUMOC VAC/ADMIN/RCVD: CPT | Performed by: ORTHOPAEDIC SURGERY

## 2022-03-01 PROCEDURE — G8417 CALC BMI ABV UP PARAM F/U: HCPCS | Performed by: ORTHOPAEDIC SURGERY

## 2022-03-01 PROCEDURE — G8428 CUR MEDS NOT DOCUMENT: HCPCS | Performed by: ORTHOPAEDIC SURGERY

## 2022-03-01 PROCEDURE — 1123F ACP DISCUSS/DSCN MKR DOCD: CPT | Performed by: ORTHOPAEDIC SURGERY

## 2022-03-01 PROCEDURE — 1036F TOBACCO NON-USER: CPT | Performed by: ORTHOPAEDIC SURGERY

## 2022-03-01 PROCEDURE — G8482 FLU IMMUNIZE ORDER/ADMIN: HCPCS | Performed by: ORTHOPAEDIC SURGERY

## 2022-03-01 PROCEDURE — 99212 OFFICE O/P EST SF 10 MIN: CPT | Performed by: ORTHOPAEDIC SURGERY

## 2022-03-01 NOTE — PROGRESS NOTES
Patient returns to the office today for FU of the right TKA DOS 4/19/21. Pt states pain today is a 0/10 pt states overall he is doing well and has no concerns at this time.

## 2022-03-06 ASSESSMENT — ENCOUNTER SYMPTOMS
COLOR CHANGE: 0
SHORTNESS OF BREATH: 0
CHEST TIGHTNESS: 0

## 2022-03-06 NOTE — PROGRESS NOTES
3/1/2022   Chief Complaint   Patient presents with    Knee Pain     right TKA DOS 4/19/21        History of Present Illness:                             Ernie Vega is a 64 y.o. male returns today for follow-up of his right total knee replacement. He is nearly 1 year out from surgery. He has been doing very well. No complaints of pain or instability. He feels that his knee has good strength and range of motion. Things have improved since his last visit. Patient returns to the office today for FU of the right TKA DOS 4/19/21. Pt states pain today is a 0/10 pt states overall he is doing well and has no concerns at this time. Medical History  Patient's medications, allergies, past medical, surgical, social and family histories were reviewed and updated as appropriate. Review of Systems   Constitutional: Negative for activity change and fever. Respiratory: Negative for chest tightness and shortness of breath. Cardiovascular: Negative for chest pain. Skin: Negative for color change. Neurological: Negative for dizziness. Psychiatric/Behavioral: The patient is not nervous/anxious. Examination:  General Exam:  Vitals: Resp 15   Ht 5' 6\" (1.676 m)   Wt 165 lb (74.8 kg)   BMI 26.63 kg/m²    Physical Exam     Right lower extremity:  Well-healed midline incision. Full painless active range of motion of the knee 0 to 120 degrees of flexion. Normal alignment with no instability at the knee. Sensation motor function is intact lower extremity. Patient is ambulating well without pain. Office Procedures:  No orders of the defined types were placed in this encounter. Assessment and Plan  1. Right total knee replacement    He has noticed good improvement with his knee and feels that it is functioning well. I encouraged him to continue exercise program.  He can continue with activities as tolerated.   Follow-up as needed if any questions or concerns        Electronically signed by Carolina Ba MD on 3/6/2022 at 9:14 AM

## 2022-03-10 ENCOUNTER — OFFICE VISIT (OUTPATIENT)
Dept: NEUROLOGY | Age: 77
End: 2022-03-10
Payer: MEDICARE

## 2022-03-10 DIAGNOSIS — M54.17 LUMBOSACRAL RADICULOPATHY AT L5: ICD-10-CM

## 2022-03-10 PROCEDURE — 95886 MUSC TEST DONE W/N TEST COMP: CPT | Performed by: STUDENT IN AN ORGANIZED HEALTH CARE EDUCATION/TRAINING PROGRAM

## 2022-03-10 PROCEDURE — 95910 NRV CNDJ TEST 7-8 STUDIES: CPT | Performed by: STUDENT IN AN ORGANIZED HEALTH CARE EDUCATION/TRAINING PROGRAM
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
## 2022-04-26 ENCOUNTER — OFFICE VISIT (OUTPATIENT)
Dept: NEUROLOGY | Age: 77
End: 2022-04-26
Payer: MEDICARE

## 2022-04-26 VITALS
DIASTOLIC BLOOD PRESSURE: 68 MMHG | WEIGHT: 165 LBS | HEART RATE: 74 BPM | HEIGHT: 66 IN | BODY MASS INDEX: 26.52 KG/M2 | OXYGEN SATURATION: 96 % | SYSTOLIC BLOOD PRESSURE: 132 MMHG

## 2022-04-26 DIAGNOSIS — R41.89 PSEUDODEMENTIA: ICD-10-CM

## 2022-04-26 DIAGNOSIS — G31.84 MILD COGNITIVE IMPAIRMENT WITH MEMORY LOSS: ICD-10-CM

## 2022-04-26 DIAGNOSIS — F32.A DEPRESSION, UNSPECIFIED DEPRESSION TYPE: ICD-10-CM

## 2022-04-26 DIAGNOSIS — M54.17 LUMBOSACRAL RADICULOPATHY AT L5: Primary | ICD-10-CM

## 2022-04-26 DIAGNOSIS — E55.9 VITAMIN D DEFICIENCY: ICD-10-CM

## 2022-04-26 DIAGNOSIS — Z77.29 CARBON MONOXIDE EXPOSURE: ICD-10-CM

## 2022-04-26 PROCEDURE — 1123F ACP DISCUSS/DSCN MKR DOCD: CPT | Performed by: NURSE PRACTITIONER

## 2022-04-26 PROCEDURE — G8427 DOCREV CUR MEDS BY ELIG CLIN: HCPCS | Performed by: NURSE PRACTITIONER

## 2022-04-26 PROCEDURE — 99214 OFFICE O/P EST MOD 30 MIN: CPT | Performed by: NURSE PRACTITIONER

## 2022-04-26 PROCEDURE — 1036F TOBACCO NON-USER: CPT | Performed by: NURSE PRACTITIONER

## 2022-04-26 PROCEDURE — G8417 CALC BMI ABV UP PARAM F/U: HCPCS | Performed by: NURSE PRACTITIONER

## 2022-04-26 PROCEDURE — 4040F PNEUMOC VAC/ADMIN/RCVD: CPT | Performed by: NURSE PRACTITIONER

## 2022-04-26 RX ORDER — CHOLECALCIFEROL (VITAMIN D3) 125 MCG
5000 CAPSULE ORAL DAILY
Qty: 30 CAPSULE | Refills: 5 | Status: SHIPPED | OUTPATIENT
Start: 2022-04-26
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
## 2022-05-04 ENCOUNTER — TELEPHONE (OUTPATIENT)
Dept: FAMILY MEDICINE CLINIC | Age: 77
End: 2022-05-04

## 2022-05-04 NOTE — TELEPHONE ENCOUNTER
----- Message from Wing Clark sent at 5/4/2022 11:47 AM EDT -----  Subject: Appointment Request    Reason for Call: Routine Medicare AWV    QUESTIONS  Type of Appointment? Established Patient  Reason for appointment request? No appointments available during search  Additional Information for Provider? Patient needs his AWV, unable to   schedule in , please call back to schedule.  ---------------------------------------------------------------------------  --------------  CALL BACK INFO  What is the best way for the office to contact you? OK to leave message on   voicemail  Preferred Call Back Phone Number? [phone]  ---------------------------------------------------------------------------  --------------  SCRIPT ANSWERS  Relationship to Patient? Self  (If the patient has Medicare as their primary insurance coverage ask this   question) Are you requesting a Medicare Annual Wellness Visit? Yes   Have you been diagnosed with, awaiting test results for, or told that you   are suspected of having COVID-19 (Coronavirus)? (If patient has tested   negative or was tested as a requirement for work, school, or travel and   not based on symptoms, answer no)? No  Within the past 10 days have you developed any of the following symptoms   (answer no if symptoms have been present longer than 10 days or began   more than 10 days ago)? Fever or Chills, Cough, Shortness of breath or   difficulty breathing, Loss of taste or smell, Sore throat, Nasal   congestion, Sneezing or runny nose, Fatigue or generalized body aches   (answer no if pain is specific to a body part e.g. back pain), Diarrhea,   Headache? No  Have you had close contact with someone with COVID-19 in the last 7 days? No  (Service Expert  click yes below to proceed with [employer]ual   Scheduling)?  Yes

## 2022-05-18 ENCOUNTER — OFFICE VISIT (OUTPATIENT)
Dept: FAMILY MEDICINE CLINIC | Age: 77
End: 2022-05-18
Payer: MEDICARE

## 2022-05-18 VITALS
RESPIRATION RATE: 14 BRPM | OXYGEN SATURATION: 98 % | DIASTOLIC BLOOD PRESSURE: 64 MMHG | TEMPERATURE: 98 F | HEART RATE: 74 BPM | WEIGHT: 163 LBS | BODY MASS INDEX: 26.31 KG/M2 | SYSTOLIC BLOOD PRESSURE: 114 MMHG

## 2022-05-18 DIAGNOSIS — Z00.00 ENCOUNTER FOR SUBSEQUENT ANNUAL WELLNESS VISIT (AWV) IN MEDICARE PATIENT: Primary | ICD-10-CM

## 2022-05-18 PROCEDURE — G0439 PPPS, SUBSEQ VISIT: HCPCS | Performed by: FAMILY MEDICINE

## 2022-05-18 PROCEDURE — 1123F ACP DISCUSS/DSCN MKR DOCD: CPT | Performed by: FAMILY MEDICINE

## 2022-05-18 PROCEDURE — 4040F PNEUMOC VAC/ADMIN/RCVD: CPT | Performed by: FAMILY MEDICINE

## 2022-05-18 ASSESSMENT — PATIENT HEALTH QUESTIONNAIRE - PHQ9
3. TROUBLE FALLING OR STAYING ASLEEP: 3
2. FEELING DOWN, DEPRESSED OR HOPELESS: 3
9. THOUGHTS THAT YOU WOULD BE BETTER OFF DEAD, OR OF HURTING YOURSELF: 1
7. TROUBLE CONCENTRATING ON THINGS, SUCH AS READING THE NEWSPAPER OR WATCHING TELEVISION: 3
5. POOR APPETITE OR OVEREATING: 3
10. IF YOU CHECKED OFF ANY PROBLEMS, HOW DIFFICULT HAVE THESE PROBLEMS MADE IT FOR YOU TO DO YOUR WORK, TAKE CARE OF THINGS AT HOME, OR GET ALONG WITH OTHER PEOPLE: 0
SUM OF ALL RESPONSES TO PHQ QUESTIONS 1-9: 14
SUM OF ALL RESPONSES TO PHQ QUESTIONS 1-9: 13
SUM OF ALL RESPONSES TO PHQ QUESTIONS 1-9: 14
SUM OF ALL RESPONSES TO PHQ9 QUESTIONS 1 & 2: 4
6. FEELING BAD ABOUT YOURSELF - OR THAT YOU ARE A FAILURE OR HAVE LET YOURSELF OR YOUR FAMILY DOWN: 0
SUM OF ALL RESPONSES TO PHQ QUESTIONS 1-9: 14
4. FEELING TIRED OR HAVING LITTLE ENERGY: 0
8. MOVING OR SPEAKING SO SLOWLY THAT OTHER PEOPLE COULD HAVE NOTICED. OR THE OPPOSITE, BEING SO FIGETY OR RESTLESS THAT YOU HAVE BEEN MOVING AROUND A LOT MORE THAN USUAL: 0
1. LITTLE INTEREST OR PLEASURE IN DOING THINGS: 1

## 2022-05-18 ASSESSMENT — COLUMBIA-SUICIDE SEVERITY RATING SCALE - C-SSRS
1. WITHIN THE PAST MONTH, HAVE YOU WISHED YOU WERE DEAD OR WISHED YOU COULD GO TO SLEEP AND NOT WAKE UP?: YES
6. HAVE YOU EVER DONE ANYTHING, STARTED TO DO ANYTHING, OR PREPARED TO DO ANYTHING TO END YOUR LIFE?: NO
2. HAVE YOU ACTUALLY HAD ANY THOUGHTS OF KILLING YOURSELF?: NO

## 2022-05-18 ASSESSMENT — LIFESTYLE VARIABLES: HOW OFTEN DO YOU HAVE A DRINK CONTAINING ALCOHOL: NEVER

## 2022-05-18 NOTE — PROGRESS NOTES
Medicare Annual Wellness Visit    Elliott Ortega is here for Medicare AWV (patient is here for his medicare wellness )    Assessment & Plan   Encounter for subsequent annual wellness visit (AWV) in Medicare patient      Recommendations for Preventive Services Due: see orders and patient instructions/AVS.  Recommended screening schedule for the next 5-10 years is provided to the patient in written form: see Patient Instructions/AVS.     Return for Medicare Annual Wellness Visit in 1 year. Subjective     Patient's complete Health Risk Assessment and screening values have been reviewed and are found in Flowsheets. The following problems were reviewed today and where indicated follow up appointments were made and/or referrals ordered.     Positive Risk Factor Screenings with Interventions:    Fall Risk:  Do you feel unsteady or are you worried about falling? : (!) yes  2 or more falls in past year?: no     Fall Risk Interventions:    · Home safety tips provided     Depression:  PHQ-2 Score: 4  PHQ-9 Total Score: 14    Severity:1-4 = minimal depression, 5-9 = mild depression, 10-14 = moderate depression, 15-19 = moderately severe depression, 20-27 = severe depression    Depression Interventions:  · LPN INTERVENTION GUIDE: SCORE 5-14 = MODERATE DEPRESSION: FOLLOW UP IN 1 [facility] and ACP:  General  In general, how would you say your health is?: Good  In the past 7 days, have you experienced any of the following: New or Increased Pain, New or Increased Fatigue, Loneliness, Social Isolation, Stress or Anger?: (!) Yes  Select all that apply: (!) Loneliness (lives alone with his cat)  Do you get the social and emotional support that you need?: Yes  Do you have a Living Will?: (!) No    Advance Directives     Power of  Living Will ACP-Advance Directive ACP-Power of     Not on File Not on File Not on File Not on File          Health Habits/Nutrition:     Physical Activity: Unknown JULIA Fleming - NP   tamsulosin (FLOMAX) 0.4 MG capsule Take 1 capsule by mouth daily Yes Elinor Projackson, [employer]   Multiple Vitamins-Minerals (ICAPS AREDS 2 PO) Take by mouth 2 times daily Yes Historical Provider, MD Duggan (Including outside providers/suppliers regularly involved in providing care):   Patient Care Team:  Martir Casanova MD as PCP - General (Family Medicine)  Elinor Youssef [employer] as PCP - [facility] Empaneled Provider     Reviewed and updated this visit:  Tobacco  Allergies  Meds  Problems  Med Hx  Surg Hx  Soc Hx  Fam Hx

## 2022-05-18 NOTE — PATIENT INSTRUCTIONS
Personalized Preventive Plan for Cathleen Norris Sr. - 5/18/2022  Medicare offers a range of preventive health benefits. Some of the tests and screenings are paid in full while other may be subject to a deductible, co-insurance, and/or copay. Some of these benefits include a comprehensive review of your medical history including lifestyle, illnesses that may run in your family, and various assessments and screenings as appropriate. After reviewing your medical record and screening and assessments performed today your provider may have ordered immunizations, labs, imaging, and/or referrals for you. A list of these orders (if applicable) as well as your Preventive Care list are included within your After Visit Summary for your review. Other Preventive Recommendations:    · A preventive eye exam performed by an eye specialist is recommended every 1-2 years to screen for glaucoma; cataracts, macular degeneration, and other eye disorders. · A preventive dental visit is recommended every 6 months. · Try to get at least 150 minutes of exercise per week or 10,000 steps per day on a pedometer . · Order or download the FREE \"Exercise & Physical Activity: Your Everyday Guide\" from [employer]. Call 7-390.724.8078 or search [employer] online. · You need 2433-6573 mg of calcium and 5314-3338 IU of vitamin D per day. It is possible to meet your calcium requirement with diet alone, but a vitamin D supplement is usually necessary to meet this goal.  · When exposed to the sun, use a sunscreen that protects against both UVA and UVB radiation with an SPF of 30 or greater. Reapply every 2 to 3 hours or after sweating, drying off with a towel, or swimming. · Always wear a seat belt when traveling in a car. Always wear a helmet when riding a bicycle or motorcycle.

## 2023-03-10 ENCOUNTER — OFFICE VISIT (OUTPATIENT)
Dept: NEUROLOGY | Age: 78
End: 2023-03-10

## 2023-03-10 VITALS
BODY MASS INDEX: 26.2 KG/M2 | SYSTOLIC BLOOD PRESSURE: 118 MMHG | WEIGHT: 163 LBS | OXYGEN SATURATION: 97 % | HEART RATE: 71 BPM | HEIGHT: 66 IN | DIASTOLIC BLOOD PRESSURE: 66 MMHG

## 2023-03-10 DIAGNOSIS — E55.9 VITAMIN D DEFICIENCY: ICD-10-CM

## 2023-03-10 DIAGNOSIS — M54.17 LUMBOSACRAL RADICULOPATHY AT L5: Primary | ICD-10-CM

## 2023-03-10 DIAGNOSIS — G31.84 MILD COGNITIVE IMPAIRMENT WITH MEMORY LOSS: ICD-10-CM

## 2023-03-10 DIAGNOSIS — R41.89 PSEUDODEMENTIA: ICD-10-CM

## 2023-03-10 DIAGNOSIS — R26.0 ATAXIC GAIT: ICD-10-CM

## 2023-03-10 RX ORDER — CHOLECALCIFEROL (VITAMIN D3) 125 MCG
5000 CAPSULE ORAL DAILY
Qty: 30 CAPSULE | Refills: 5 | Status: SHIPPED | OUTPATIENT
Start: 2023-03-10

## 2023-03-10 NOTE — PATIENT INSTRUCTIONS
Please contact our office around 6/15/23 to get your follow up appointment made for September 2023. Our scheduling phone number is [phone]. Thank you!
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
## 2023-03-13 DIAGNOSIS — N40.1 BENIGN PROSTATIC HYPERPLASIA WITH URINARY FREQUENCY: ICD-10-CM

## 2023-03-13 DIAGNOSIS — R35.0 BENIGN PROSTATIC HYPERPLASIA WITH URINARY FREQUENCY: ICD-10-CM

## 2023-03-13 RX ORDER — TAMSULOSIN HYDROCHLORIDE 0.4 MG/1
0.4 CAPSULE ORAL DAILY
Qty: 90 CAPSULE | Refills: 1 | OUTPATIENT
Start: 2023-03-13

## 2023-04-25 ENCOUNTER — HOSPITAL ENCOUNTER (OUTPATIENT)
Dept: PHYSICAL THERAPY | Age: 78
Setting detail: THERAPIES SERIES
Discharge: HOME | End: 2023-04-25
Payer: MEDICARE

## 2023-04-25 PROCEDURE — 97161 PT EVAL LOW COMPLEX 20 MIN: CPT

## 2023-04-25 PROCEDURE — 97110 THERAPEUTIC EXERCISES: CPT
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
## 2023-05-09 ENCOUNTER — HOSPITAL ENCOUNTER (OUTPATIENT)
Dept: PHYSICAL THERAPY | Age: 78
Setting detail: THERAPIES SERIES
Discharge: HOME | End: 2023-05-09
Payer: MEDICARE

## 2023-05-09 PROCEDURE — 97116 GAIT TRAINING THERAPY: CPT

## 2023-05-09 PROCEDURE — 97112 NEUROMUSCULAR REEDUCATION: CPT
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
## 2023-05-12 ENCOUNTER — HOSPITAL ENCOUNTER (OUTPATIENT)
Dept: PHYSICAL THERAPY | Age: 78
Setting detail: THERAPIES SERIES
Discharge: HOME | End: 2023-05-12
Payer: MEDICARE

## 2023-05-12 PROCEDURE — 97110 THERAPEUTIC EXERCISES: CPT | Performed by: PHYSICAL THERAPY ASSISTANT

## 2023-05-12 PROCEDURE — 97116 GAIT TRAINING THERAPY: CPT | Performed by: PHYSICAL THERAPY ASSISTANT

## 2023-05-17 ENCOUNTER — HOSPITAL ENCOUNTER (OUTPATIENT)
Dept: PHYSICAL THERAPY | Age: 78
Setting detail: THERAPIES SERIES
Discharge: HOME | End: 2023-05-17
Payer: MEDICARE

## 2023-05-17 PROCEDURE — 97110 THERAPEUTIC EXERCISES: CPT

## 2023-05-17 PROCEDURE — 97116 GAIT TRAINING THERAPY: CPT
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
## 2023-05-19 ENCOUNTER — HOSPITAL ENCOUNTER (OUTPATIENT)
Dept: PHYSICAL THERAPY | Age: 78
Setting detail: THERAPIES SERIES
Discharge: HOME | End: 2023-05-19
Payer: MEDICARE

## 2023-05-19 PROCEDURE — 97116 GAIT TRAINING THERAPY: CPT

## 2023-05-19 PROCEDURE — 97112 NEUROMUSCULAR REEDUCATION: CPT

## 2023-05-19 PROCEDURE — 97110 THERAPEUTIC EXERCISES: CPT
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
## 2023-05-24 ENCOUNTER — HOSPITAL ENCOUNTER (OUTPATIENT)
Dept: PHYSICAL THERAPY | Age: 78
Setting detail: THERAPIES SERIES
Discharge: HOME | End: 2023-05-24
Payer: MEDICARE

## 2023-05-24 PROCEDURE — 97110 THERAPEUTIC EXERCISES: CPT

## 2023-05-24 PROCEDURE — 97112 NEUROMUSCULAR REEDUCATION: CPT

## 2023-05-24 PROCEDURE — 97530 THERAPEUTIC ACTIVITIES: CPT
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
## 2023-05-25 ENCOUNTER — OFFICE VISIT (OUTPATIENT)
Dept: FAMILY MEDICINE CLINIC | Age: 78
End: 2023-05-25
Payer: MEDICARE

## 2023-05-25 VITALS
OXYGEN SATURATION: 96 % | SYSTOLIC BLOOD PRESSURE: 116 MMHG | WEIGHT: 164 LBS | HEART RATE: 67 BPM | BODY MASS INDEX: 26.47 KG/M2 | RESPIRATION RATE: 18 BRPM | DIASTOLIC BLOOD PRESSURE: 68 MMHG

## 2023-05-25 DIAGNOSIS — F32.A DEPRESSION, UNSPECIFIED DEPRESSION TYPE: ICD-10-CM

## 2023-05-25 DIAGNOSIS — M54.16 LUMBAR RADICULOPATHY: ICD-10-CM

## 2023-05-25 DIAGNOSIS — R39.14 BENIGN PROSTATIC HYPERPLASIA WITH INCOMPLETE BLADDER EMPTYING: ICD-10-CM

## 2023-05-25 DIAGNOSIS — R41.3 MEMORY DIFFICULTIES: ICD-10-CM

## 2023-05-25 DIAGNOSIS — Z91.81 AT HIGH RISK FOR FALLS: ICD-10-CM

## 2023-05-25 DIAGNOSIS — E55.9 VITAMIN D DEFICIENCY: Primary | ICD-10-CM

## 2023-05-25 DIAGNOSIS — R26.9 ABNORMAL GAIT: ICD-10-CM

## 2023-05-25 DIAGNOSIS — Z12.5 SCREENING PSA (PROSTATE SPECIFIC ANTIGEN): ICD-10-CM

## 2023-05-25 DIAGNOSIS — N40.1 BENIGN PROSTATIC HYPERPLASIA WITH INCOMPLETE BLADDER EMPTYING: ICD-10-CM

## 2023-05-25 LAB
BASOPHILS # BLD: 0.1 K/UL (ref 0–0.2)
BASOPHILS NFR BLD: 0.8 %
DEPRECATED RDW RBC AUTO: 14.7 % (ref 12.4–15.4)
EOSINOPHIL # BLD: 0.1 K/UL (ref 0–0.6)
EOSINOPHIL NFR BLD: 0.9 %
HCT VFR BLD AUTO: 46.1 % (ref 40.5–52.5)
HGB BLD-MCNC: 15.5 G/DL (ref 13.5–17.5)
LYMPHOCYTES # BLD: 1.6 K/UL (ref 1–5.1)
LYMPHOCYTES NFR BLD: 19.6 %
MCH RBC QN AUTO: 33.1 PG (ref 26–34)
MCHC RBC AUTO-ENTMCNC: 33.6 G/DL (ref 31–36)
MCV RBC AUTO: 98.6 FL (ref 80–100)
MONOCYTES # BLD: 0.9 K/UL (ref 0–1.3)
MONOCYTES NFR BLD: 11.6 %
NEUTROPHILS # BLD: 5.4 K/UL (ref 1.7–7.7)
NEUTROPHILS NFR BLD: 67.1 %
PLATELET # BLD AUTO: 237 K/UL (ref 135–450)
PMV BLD AUTO: 10 FL (ref 5–10.5)
RBC # BLD AUTO: 4.67 M/UL (ref 4.2–5.9)
WBC # BLD AUTO: 8.1 K/UL (ref 4–11)

## 2023-05-25 PROCEDURE — 36415 COLL VENOUS BLD VENIPUNCTURE: CPT | Performed by: PHYSICIAN ASSISTANT

## 2023-05-25 PROCEDURE — 99203 OFFICE O/P NEW LOW 30 MIN: CPT | Performed by: PHYSICIAN ASSISTANT

## 2023-05-25 PROCEDURE — G8427 DOCREV CUR MEDS BY ELIG CLIN: HCPCS | Performed by: PHYSICIAN ASSISTANT

## 2023-05-25 PROCEDURE — 1036F TOBACCO NON-USER: CPT | Performed by: PHYSICIAN ASSISTANT

## 2023-05-25 PROCEDURE — G8417 CALC BMI ABV UP PARAM F/U: HCPCS | Performed by: PHYSICIAN ASSISTANT

## 2023-05-25 PROCEDURE — 1123F ACP DISCUSS/DSCN MKR DOCD: CPT | Performed by: PHYSICIAN ASSISTANT

## 2023-05-25 SDOH — ECONOMIC STABILITY: FOOD INSECURITY: WITHIN THE PAST 12 MONTHS, YOU WORRIED THAT YOUR FOOD WOULD RUN OUT BEFORE YOU GOT MONEY TO BUY MORE.: NEVER TRUE

## 2023-05-25 SDOH — ECONOMIC STABILITY: INCOME INSECURITY: HOW HARD IS IT FOR YOU TO PAY FOR THE VERY BASICS LIKE FOOD, HOUSING, MEDICAL CARE, AND HEATING?: NOT HARD AT ALL

## 2023-05-25 SDOH — ECONOMIC STABILITY: FOOD INSECURITY: WITHIN THE PAST 12 MONTHS, THE FOOD YOU BOUGHT JUST DIDN'T LAST AND YOU DIDN'T HAVE MONEY TO GET MORE.: NEVER TRUE

## 2023-05-25 SDOH — ECONOMIC STABILITY: HOUSING INSECURITY
IN THE LAST 12 MONTHS, WAS THERE A TIME WHEN YOU DID NOT HAVE A STEADY PLACE TO SLEEP OR SLEPT IN A SHELTER (INCLUDING NOW)?: NO

## 2023-05-25 ASSESSMENT — PATIENT HEALTH QUESTIONNAIRE - PHQ9
10. IF YOU CHECKED OFF ANY PROBLEMS, HOW DIFFICULT HAVE THESE PROBLEMS MADE IT FOR YOU TO DO YOUR WORK, TAKE CARE OF THINGS AT HOME, OR GET ALONG WITH OTHER PEOPLE: 0
7. TROUBLE CONCENTRATING ON THINGS, SUCH AS READING THE NEWSPAPER OR WATCHING TELEVISION: 0
4. FEELING TIRED OR HAVING LITTLE ENERGY: 3
8. MOVING OR SPEAKING SO SLOWLY THAT OTHER PEOPLE COULD HAVE NOTICED. OR THE OPPOSITE, BEING SO FIGETY OR RESTLESS THAT YOU HAVE BEEN MOVING AROUND A LOT MORE THAN USUAL: 0
1. LITTLE INTEREST OR PLEASURE IN DOING THINGS: 1
SUM OF ALL RESPONSES TO PHQ QUESTIONS 1-9: 10
2. FEELING DOWN, DEPRESSED OR HOPELESS: 3
6. FEELING BAD ABOUT YOURSELF - OR THAT YOU ARE A FAILURE OR HAVE LET YOURSELF OR YOUR FAMILY DOWN: 0
SUM OF ALL RESPONSES TO PHQ QUESTIONS 1-9: 10
5. POOR APPETITE OR OVEREATING: 0
SUM OF ALL RESPONSES TO PHQ QUESTIONS 1-9: 10
SUM OF ALL RESPONSES TO PHQ9 QUESTIONS 1 & 2: 4
SUM OF ALL RESPONSES TO PHQ QUESTIONS 1-9: 10
9. THOUGHTS THAT YOU WOULD BE BETTER OFF DEAD, OR OF HURTING YOURSELF: 0
3. TROUBLE FALLING OR STAYING ASLEEP: 3
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
## 2023-05-25 NOTE — PATIENT INSTRUCTIONS
Welcome to [facility]:    Did you know we now have a faster way for you to move through your appointment? For your convenience, we now have digital registration available. When you schedule your next appointment, you will receive a link via your email as well as a text message that will allow you to complete any paperwork digitally before your appointment. We are committed to providing you the best care possible. If you receive a survey after visiting one of our offices, please take time to share your experience concerning your physician office visit. These surveys are confidential and no health information about you is shared. We are eager to improve for you and continue to give you satisfactory care, we are counting on your feedback to help make that happen.

## 2023-05-26 ENCOUNTER — TELEPHONE (OUTPATIENT)
Dept: FAMILY MEDICINE CLINIC | Age: 78
End: 2023-05-26

## 2023-05-26 ENCOUNTER — HOSPITAL ENCOUNTER (OUTPATIENT)
Dept: PHYSICAL THERAPY | Age: 78
Setting detail: THERAPIES SERIES
Discharge: HOME | End: 2023-05-26
Payer: MEDICARE

## 2023-05-26 PROBLEM — M54.16 LUMBAR RADICULOPATHY: Status: ACTIVE | Noted: 2023-05-26

## 2023-05-26 PROBLEM — Z12.5 SCREENING PSA (PROSTATE SPECIFIC ANTIGEN): Status: ACTIVE | Noted: 2023-05-26

## 2023-05-26 PROBLEM — R26.9 ABNORMAL GAIT: Status: ACTIVE | Noted: 2023-05-26

## 2023-05-26 LAB
25(OH)D3 SERPL-MCNC: 60.6 NG/ML
ALBUMIN SERPL-MCNC: 4.6 G/DL (ref 3.4–5)
ALBUMIN/GLOB SERPL: 2.4 {RATIO} (ref 1.1–2.2)
ALP SERPL-CCNC: 76 U/L (ref 40–129)
ALT SERPL-CCNC: 14 U/L (ref 10–40)
ANION GAP SERPL CALCULATED.3IONS-SCNC: 12 MMOL/L (ref 3–16)
AST SERPL-CCNC: 22 U/L (ref 15–37)
BILIRUB SERPL-MCNC: 1 MG/DL (ref 0–1)
BUN SERPL-MCNC: 18 MG/DL (ref 7–20)
CALCIUM SERPL-MCNC: 10.1 MG/DL (ref 8.3–10.6)
CHLORIDE SERPL-SCNC: 104 MMOL/L (ref 99–110)
CO2 SERPL-SCNC: 26 MMOL/L (ref 21–32)
CREAT SERPL-MCNC: 0.9 MG/DL (ref 0.8–1.3)
GFR SERPLBLD CREATININE-BSD FMLA CKD-EPI: >60 ML/MIN/{1.73_M2}
GLUCOSE SERPL-MCNC: 87 MG/DL (ref 70–99)
POTASSIUM SERPL-SCNC: 4.6 MMOL/L (ref 3.5–5.1)
PROT SERPL-MCNC: 6.5 G/DL (ref 6.4–8.2)
PSA SERPL DL<=0.01 NG/ML-MCNC: 2.02 NG/ML (ref 0–4)
SODIUM SERPL-SCNC: 142 MMOL/L (ref 136–145)

## 2023-05-26 PROCEDURE — 97530 THERAPEUTIC ACTIVITIES: CPT

## 2023-05-26 PROCEDURE — 97110 THERAPEUTIC EXERCISES: CPT

## 2023-05-26 PROCEDURE — 97112 NEUROMUSCULAR REEDUCATION: CPT

## 2023-05-26 ASSESSMENT — ENCOUNTER SYMPTOMS
GASTROINTESTINAL NEGATIVE: 1
BACK PAIN: 1
RESPIRATORY NEGATIVE: 1

## 2023-05-26 NOTE — ASSESSMENT & PLAN NOTE
Due to recent falls/balance issues/memory issues and NPH not excluded on previous MRI, will repeat now.
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
## 2023-05-26 NOTE — TELEPHONE ENCOUNTER
Pt called the office stating that he is returning our call. Advised pt that per PCP all of his labs look great. Pt verbalized understanding.  No questions or concerns at this time

## 2023-06-02 ENCOUNTER — HOSPITAL ENCOUNTER (OUTPATIENT)
Dept: PHYSICAL THERAPY | Age: 78
Setting detail: THERAPIES SERIES
Discharge: HOME | End: 2023-06-02
Payer: MEDICARE

## 2023-06-02 PROCEDURE — 97530 THERAPEUTIC ACTIVITIES: CPT

## 2023-06-02 PROCEDURE — 97110 THERAPEUTIC EXERCISES: CPT

## 2023-06-02 PROCEDURE — 97112 NEUROMUSCULAR REEDUCATION: CPT
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
## 2023-06-07 ENCOUNTER — HOSPITAL ENCOUNTER (OUTPATIENT)
Dept: PHYSICAL THERAPY | Age: 78
Setting detail: THERAPIES SERIES
Discharge: HOME | End: 2023-06-07
Payer: MEDICARE

## 2023-06-07 PROCEDURE — 97530 THERAPEUTIC ACTIVITIES: CPT

## 2023-06-07 PROCEDURE — 97112 NEUROMUSCULAR REEDUCATION: CPT

## 2023-06-07 NOTE — FLOWSHEET NOTE
tx 1 lap Giuliano   Obstacle course with all above    3 laps CGA<>Giuliano    BOSU     30''x2 Giuliano, flat side   MODALITIES                            Other Therapeutic Activities/Education:  Patient received education on their current pathology and how their condition effects them with their functional activities. Patient understood discussion and questions were answered. Patient understands their activity limitations and understands rational for treatment progression. Instructed patient to get and bring adjustable cane for next visit. Home Exercise Program:  *HO issued, reviewed and discussed with patient. All questions answered. Pt agreed to comply. Access Code: [email or web address]  URL: [email or web address]  Date: 05/24/2023  Prepared by: Jovani Perez    Exercises  - Sit to Stand Without Arm Support  - 1 x daily - 7 x weekly - 3 sets - 10 reps  - Standing Tandem Balance with Counter Support  - 1 x daily - 7 x weekly - 3 sets - 10 reps  - Standing 3-Way Kick  - 1 x daily - 7 x weekly - 3 sets - 10 reps  - Narrow Stance with Unilateral Counter Support  - 1 x daily - 7 x weekly - 3 sets - 10 reps  - Forward Step Up with Counter Support  - 1 x daily - 7 x weekly - 3 sets - 10 reps  - Standing March with Counter Support  - 1 x daily - 7 x weekly - 3 sets - 10 reps  - Alternating Step Taps with Counter Support  - 1 x daily - 7 x weekly - 3 sets - 10 reps    Manual Treatments:  --      Modalities:  --      Communication with other providers:  omer sent 4/25/23      Assessment:  Pt tolerated tx fair due to L knee pain. Limited on tx activities due to L knee pain. Pt reported increased L knee pain with tandem and walking marches. L knee pain diminished by the end of the tx, per PT recommendation pt told to ice and regress activity at home. Recommended to f/u w/ PCP. Plan for Next Session: gait with changes in directions.         Time In / Time Out:    1231-1340    Timed Code/Total Treatment Minutes:  45' 1 TA, 2 NR    Next

## 2023-06-08 ENCOUNTER — TELEPHONE (OUTPATIENT)
Dept: FAMILY MEDICINE CLINIC | Age: 78
End: 2023-06-08

## 2023-06-08 NOTE — TELEPHONE ENCOUNTER
Patient stopped into office and wanting to talk with PCP about how he is having trouble with left knee since he has been taking therapy. Patient would like referral to orthopedic but doesn't want to go to orthopedic that done his right knee.    Please adbise

## 2023-06-09 ENCOUNTER — HOSPITAL ENCOUNTER (OUTPATIENT)
Dept: PHYSICAL THERAPY | Age: 78
Setting detail: THERAPIES SERIES
Discharge: HOME | End: 2023-06-09
Payer: MEDICARE

## 2023-06-09 DIAGNOSIS — G89.29 CHRONIC PAIN OF LEFT KNEE: Primary | ICD-10-CM

## 2023-06-09 DIAGNOSIS — M25.562 CHRONIC PAIN OF LEFT KNEE: Primary | ICD-10-CM

## 2023-06-09 PROCEDURE — 97112 NEUROMUSCULAR REEDUCATION: CPT

## 2023-06-09 PROCEDURE — 97530 THERAPEUTIC ACTIVITIES: CPT

## 2023-06-09 PROCEDURE — 97110 THERAPEUTIC EXERCISES: CPT

## 2023-06-09 NOTE — FLOWSHEET NOTE
PLOF.      Subjective:  Pt reports no pain in the knees. It did hurt this morning 10/10 with different times. He left a message with the doctor but hasn't heard back yet. Reports LOB but no falls. Any changes in Ambulatory Summary Sheet? None      Objective: At arrival ambulation with cane. Shuffled with turning and threshold  CGA with all exercises. Difficulty with initiation with festination    R knee sharp pains intermittently with standing dynamic activities. Exercises: (No more than 4 columns)   Exercise/Equipment 5/26/23 #7 6/2/23 #8 6/7/2023 #9 6/9/23 #10            WARM UP          Nu-step Lv5 5' Lv 7 5' Lv5  5' Lv6 5'    DKTC/LTR w SB       TABLE                                             STANDING       *Hip ABD/EXT X10 YTB      *Heel Raises X20 ea toe/heel      *STS       Ant step ups 6\" x10 ea side       Lat step ups  6\" x10 ea side      FM Walking Fwd/bwd/side x3 laps 10# Fwd/bwd/ x3 laps 17#  Lateral 13#                 PROPRIOCEPTION       Airex Marches x20 Marches 3x10 Marches 3x10 Marches 3x10   Static EO/EC       Airex fwd reach out of [place]   Closed stance, EC R/L head turns 2x10 On airex Closed stance, EC R/L head turns 2x10   Tandem Stance  30s ea. 30s x2 35s ea. Airex 30s ea. Wobble Board   61'' x1 static hold 60'' x1 static hold    Step taps  Obs course     Tandem walking Airex 30\" 2 laps Giuliano 1 lap Giuliano 1 lap Giuliano   gradual aggravation on knee   [place]  2 laps ea.      Walking around cones  Obs course Held due to pain 2 laps gradual aggravation on knee   Gait w/ head turns X1 lap  Ball toss up x1 lap fwd/bwd  Marching x1 lap Ball toss up x1 lap fwd/bwd    Diff deana ambulation 2 laps     High marches X1 lap Cued Throughout tx 1 lap Giuliano 1 lap CGA     Step overs bolsters    X10 fwd  X10 lat   Obstacle course with all above  3 laps CGA<>Giuliano     BOSU   30''x2 Giuliano, flat side    MODALITIES                         Other Therapeutic Activities/Education:  Patient received

## 2023-06-09 NOTE — TELEPHONE ENCOUNTER
Called patient and unable to reach. Unable to leave voice message.   The phone rang then stopped ringing

## 2023-06-09 NOTE — TELEPHONE ENCOUNTER
Called patient and explained that referral was placed to orthopedic.   Explained that the orthopedics office will contact him for appointment

## 2023-06-09 NOTE — TELEPHONE ENCOUNTER
Patient returned call and reviewed providers note with patient. Patient is agrees with referral to Orthopeds at [facility] in [place].

## 2023-06-21 ENCOUNTER — HOSPITAL ENCOUNTER (OUTPATIENT)
Dept: PHYSICAL THERAPY | Age: 78
Setting detail: THERAPIES SERIES
Discharge: HOME | End: 2023-06-21
Payer: MEDICARE

## 2023-06-21 PROCEDURE — 97530 THERAPEUTIC ACTIVITIES: CPT

## 2023-06-21 PROCEDURE — 97112 NEUROMUSCULAR REEDUCATION: CPT

## 2023-06-21 PROCEDURE — 97110 THERAPEUTIC EXERCISES: CPT
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
## 2023-06-23 ENCOUNTER — HOSPITAL ENCOUNTER (OUTPATIENT)
Dept: PHYSICAL THERAPY | Age: 78
Setting detail: THERAPIES SERIES
Discharge: HOME | End: 2023-06-23
Payer: MEDICARE

## 2023-06-23 PROCEDURE — 97530 THERAPEUTIC ACTIVITIES: CPT

## 2023-06-23 PROCEDURE — 97112 NEUROMUSCULAR REEDUCATION: CPT

## 2023-06-23 PROCEDURE — 97110 THERAPEUTIC EXERCISES: CPT
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
## 2023-06-25 PROBLEM — Z12.5 SCREENING PSA (PROSTATE SPECIFIC ANTIGEN): Status: RESOLVED | Noted: 2023-05-26 | Resolved: 2023-06-25

## 2023-06-26 ENCOUNTER — HOSPITAL ENCOUNTER (OUTPATIENT)
Dept: PHYSICAL THERAPY | Age: 78
Setting detail: THERAPIES SERIES
Discharge: HOME | End: 2023-06-26
Payer: MEDICARE

## 2023-06-26 PROCEDURE — 97112 NEUROMUSCULAR REEDUCATION: CPT

## 2023-06-26 PROCEDURE — 97530 THERAPEUTIC ACTIVITIES: CPT

## 2023-06-28 ENCOUNTER — HOSPITAL ENCOUNTER (OUTPATIENT)
Dept: PHYSICAL THERAPY | Age: 78
Setting detail: THERAPIES SERIES
Discharge: HOME | End: 2023-06-28
Payer: MEDICARE

## 2023-06-28 PROCEDURE — 97110 THERAPEUTIC EXERCISES: CPT

## 2023-06-28 PROCEDURE — 97112 NEUROMUSCULAR REEDUCATION: CPT

## 2023-07-03 ENCOUNTER — HOSPITAL ENCOUNTER (OUTPATIENT)
Dept: PHYSICAL THERAPY | Age: 78
Setting detail: THERAPIES SERIES
Discharge: HOME | End: 2023-07-03
Payer: MEDICARE

## 2023-07-03 PROCEDURE — 97112 NEUROMUSCULAR REEDUCATION: CPT

## 2023-07-03 PROCEDURE — 97110 THERAPEUTIC EXERCISES: CPT
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
## 2023-07-05 ENCOUNTER — HOSPITAL ENCOUNTER (OUTPATIENT)
Dept: PHYSICAL THERAPY | Age: 78
Setting detail: THERAPIES SERIES
Discharge: HOME | End: 2023-07-05
Payer: MEDICARE

## 2023-07-05 PROCEDURE — 97112 NEUROMUSCULAR REEDUCATION: CPT

## 2023-07-05 PROCEDURE — 97110 THERAPEUTIC EXERCISES: CPT
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
## 2023-07-07 ENCOUNTER — TELEPHONE (OUTPATIENT)
Dept: FAMILY MEDICINE CLINIC | Age: 78
End: 2023-07-07

## 2023-07-07 ENCOUNTER — OFFICE VISIT (OUTPATIENT)
Dept: INTERNAL MEDICINE CLINIC | Age: 78
End: 2023-07-07
Payer: MEDICARE

## 2023-07-07 VITALS
WEIGHT: 164 LBS | HEIGHT: 66 IN | HEART RATE: 78 BPM | SYSTOLIC BLOOD PRESSURE: 146 MMHG | DIASTOLIC BLOOD PRESSURE: 80 MMHG | BODY MASS INDEX: 26.36 KG/M2 | OXYGEN SATURATION: 99 %

## 2023-07-07 DIAGNOSIS — R94.31 ABNORMAL EKG: ICD-10-CM

## 2023-07-07 DIAGNOSIS — R06.6 INTRACTABLE HICCUPS: Primary | ICD-10-CM

## 2023-07-07 PROCEDURE — 99213 OFFICE O/P EST LOW 20 MIN: CPT | Performed by: PHYSICIAN ASSISTANT

## 2023-07-07 PROCEDURE — 1036F TOBACCO NON-USER: CPT | Performed by: PHYSICIAN ASSISTANT

## 2023-07-07 PROCEDURE — 1123F ACP DISCUSS/DSCN MKR DOCD: CPT | Performed by: PHYSICIAN ASSISTANT

## 2023-07-07 PROCEDURE — 93000 ELECTROCARDIOGRAM COMPLETE: CPT | Performed by: PHYSICIAN ASSISTANT

## 2023-07-07 PROCEDURE — G8428 CUR MEDS NOT DOCUMENT: HCPCS | Performed by: PHYSICIAN ASSISTANT

## 2023-07-07 PROCEDURE — G8417 CALC BMI ABV UP PARAM F/U: HCPCS | Performed by: PHYSICIAN ASSISTANT

## 2023-07-07 RX ORDER — PROCHLORPERAZINE MALEATE 5 MG/1
5-10 TABLET ORAL EVERY 6 HOURS PRN
Qty: 60 TABLET | Refills: 0 | Status: SHIPPED | OUTPATIENT
Start: 2023-07-07

## 2023-07-07 ASSESSMENT — ENCOUNTER SYMPTOMS
BLOOD IN STOOL: 0
VOMITING: 0
PHOTOPHOBIA: 0
SORE THROAT: 0
CHEST TIGHTNESS: 0
BACK PAIN: 0
ABDOMINAL PAIN: 0
EYE DISCHARGE: 0
CONSTIPATION: 0
SHORTNESS OF BREATH: 0
EYE PAIN: 0
NAUSEA: 0
WHEEZING: 0
EYE REDNESS: 0
COLOR CHANGE: 0
DIARRHEA: 0
COUGH: 0
RHINORRHEA: 0

## 2023-07-07 NOTE — PROGRESS NOTES
Report to emergency department if symptoms worsen or change or new symptoms develop. Patient states understanding.  -     EKG 12 lead; Future  -     prochlorperazine (COMPAZINE) 5 MG tablet; Take 1-2 tablets by mouth every 6 hours as needed (hiccups), Disp-60 tablet, R-0Normal  2. Abnormal EKG  -     Maria Elena Hahn MD, Cardiology, [facility]      Return in about 1 week (around 7/14/2023), or if symptoms worsen or fail to improve, for Follow Up. An electronic signature was used to authenticate this note.     --JORDAN Antoine

## 2023-07-10 ENCOUNTER — HOSPITAL ENCOUNTER (OUTPATIENT)
Dept: PHYSICAL THERAPY | Age: 78
Setting detail: THERAPIES SERIES
Discharge: HOME | End: 2023-07-10
Payer: MEDICARE

## 2023-07-10 ENCOUNTER — HOSPITAL ENCOUNTER (EMERGENCY)
Age: 78
Discharge: HOME | End: 2023-07-10
Attending: EMERGENCY MEDICINE
Payer: MEDICARE

## 2023-07-10 ENCOUNTER — APPOINTMENT (OUTPATIENT)
Dept: CT IMAGING | Age: 78
End: 2023-07-10
Attending: EMERGENCY MEDICINE
Payer: MEDICARE

## 2023-07-10 VITALS
OXYGEN SATURATION: 94 % | TEMPERATURE: 98.4 F | RESPIRATION RATE: 24 BRPM | WEIGHT: 164 LBS | DIASTOLIC BLOOD PRESSURE: 74 MMHG | BODY MASS INDEX: 26.36 KG/M2 | HEART RATE: 85 BPM | SYSTOLIC BLOOD PRESSURE: 149 MMHG | HEIGHT: 66 IN

## 2023-07-10 DIAGNOSIS — R06.6 INTRACTABLE HICCUPS: Primary | ICD-10-CM

## 2023-07-10 LAB
ALBUMIN SERPL-MCNC: 4.3 GM/DL (ref 3.4–5)
ALP BLD-CCNC: 65 IU/L (ref 40–129)
ALT SERPL-CCNC: 14 U/L (ref 10–40)
ANION GAP SERPL CALCULATED.3IONS-SCNC: 12 MMOL/L (ref 4–16)
AST SERPL-CCNC: 19 IU/L (ref 15–37)
BASOPHILS ABSOLUTE: 0 K/CU MM
BASOPHILS RELATIVE PERCENT: 0.3 % (ref 0–1)
BILIRUB SERPL-MCNC: 1.1 MG/DL (ref 0–1)
BUN SERPL-MCNC: 21 MG/DL (ref 6–23)
CALCIUM SERPL-MCNC: 9.6 MG/DL (ref 8.3–10.6)
CHLORIDE BLD-SCNC: 104 MMOL/L (ref 99–110)
CO2: 24 MMOL/L (ref 21–32)
CREAT SERPL-MCNC: 0.9 MG/DL (ref 0.9–1.3)
DIFFERENTIAL TYPE: ABNORMAL
EOSINOPHILS ABSOLUTE: 0 K/CU MM
EOSINOPHILS RELATIVE PERCENT: 0.2 % (ref 0–3)
GFR SERPL CREATININE-BSD FRML MDRD: >60 ML/MIN/1.73M2
GLUCOSE SERPL-MCNC: 126 MG/DL (ref 70–99)
HCT VFR BLD CALC: 48.4 % (ref 42–52)
HEMOGLOBIN: 16.6 GM/DL (ref 13.5–18)
IMMATURE NEUTROPHIL %: 1.2 % (ref 0–0.43)
LYMPHOCYTES ABSOLUTE: 1.3 K/CU MM
LYMPHOCYTES RELATIVE PERCENT: 8.9 % (ref 24–44)
MCH RBC QN AUTO: 33.1 PG (ref 27–31)
MCHC RBC AUTO-ENTMCNC: 34.3 % (ref 32–36)
MCV RBC AUTO: 96.6 FL (ref 78–100)
MONOCYTES ABSOLUTE: 1.2 K/CU MM
MONOCYTES RELATIVE PERCENT: 8.3 % (ref 0–4)
PDW BLD-RTO: 13.7 % (ref 11.7–14.9)
PLATELET # BLD: 273 K/CU MM (ref 140–440)
PMV BLD AUTO: 10.2 FL (ref 7.5–11.1)
POTASSIUM SERPL-SCNC: 4.3 MMOL/L (ref 3.5–5.1)
RBC # BLD: 5.01 M/CU MM (ref 4.6–6.2)
SEGMENTED NEUTROPHILS ABSOLUTE COUNT: 11.4 K/CU MM
SEGMENTED NEUTROPHILS RELATIVE PERCENT: 81.1 % (ref 36–66)
SODIUM BLD-SCNC: 140 MMOL/L (ref 135–145)
TOTAL IMMATURE NEUTOROPHIL: 0.17 K/CU MM
TOTAL PROTEIN: 6.7 GM/DL (ref 6.4–8.2)
TROPONIN T: <0.01 NG/ML
WBC # BLD: 14.1 K/CU MM (ref 4–10.5)

## 2023-07-10 PROCEDURE — 93005 ELECTROCARDIOGRAM TRACING: CPT | Performed by: EMERGENCY MEDICINE

## 2023-07-10 PROCEDURE — 97112 NEUROMUSCULAR REEDUCATION: CPT

## 2023-07-10 PROCEDURE — 84484 ASSAY OF TROPONIN QUANT: CPT

## 2023-07-10 PROCEDURE — C9113 INJ PANTOPRAZOLE SODIUM, VIA: HCPCS | Performed by: EMERGENCY MEDICINE

## 2023-07-10 PROCEDURE — 97110 THERAPEUTIC EXERCISES: CPT

## 2023-07-10 PROCEDURE — 85025 COMPLETE CBC W/AUTO DIFF WBC: CPT

## 2023-07-10 PROCEDURE — 80053 COMPREHEN METABOLIC PANEL: CPT

## 2023-07-10 PROCEDURE — 99284 EMERGENCY DEPT VISIT MOD MDM: CPT

## 2023-07-10 PROCEDURE — 96375 TX/PRO/DX INJ NEW DRUG ADDON: CPT

## 2023-07-10 PROCEDURE — 96374 THER/PROPH/DIAG INJ IV PUSH: CPT

## 2023-07-10 PROCEDURE — 74176 CT ABD & PELVIS W/O CONTRAST: CPT

## 2023-07-10 PROCEDURE — 6360000002 HC RX W HCPCS: Performed by: EMERGENCY MEDICINE

## 2023-07-10 RX ORDER — METOCLOPRAMIDE HYDROCHLORIDE 5 MG/ML
10 INJECTION INTRAMUSCULAR; INTRAVENOUS ONCE
Status: COMPLETED | OUTPATIENT
Start: 2023-07-10 | End: 2023-07-10

## 2023-07-10 RX ORDER — PANTOPRAZOLE SODIUM 40 MG/10ML
40 INJECTION, POWDER, LYOPHILIZED, FOR SOLUTION INTRAVENOUS ONCE
Status: COMPLETED | OUTPATIENT
Start: 2023-07-10 | End: 2023-07-10

## 2023-07-10 RX ORDER — METOCLOPRAMIDE 10 MG/1
10 TABLET ORAL 4 TIMES DAILY
Qty: 28 TABLET | Refills: 1 | Status: SHIPPED | OUTPATIENT
Start: 2023-07-10

## 2023-07-10 RX ADMIN — METOCLOPRAMIDE 10 MG: 5 INJECTION, SOLUTION INTRAMUSCULAR; INTRAVENOUS at 16:51

## 2023-07-10 RX ADMIN — PANTOPRAZOLE SODIUM 40 MG: 40 INJECTION, POWDER, FOR SOLUTION INTRAVENOUS at 16:51

## 2023-07-10 ASSESSMENT — PAIN - FUNCTIONAL ASSESSMENT: PAIN_FUNCTIONAL_ASSESSMENT: NONE - DENIES PAIN

## 2023-07-10 ASSESSMENT — LIFESTYLE VARIABLES
HOW MANY STANDARD DRINKS CONTAINING ALCOHOL DO YOU HAVE ON A TYPICAL DAY: PATIENT DOES NOT DRINK
HOW OFTEN DO YOU HAVE A DRINK CONTAINING ALCOHOL: NEVER

## 2023-07-10 ASSESSMENT — ENCOUNTER SYMPTOMS: ABDOMINAL DISTENTION: 1
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
## 2023-07-10 NOTE — PROGRESS NOTES
Outpatient Physical Therapy           [place]           [] Phone: [phone]   Fax: [phone]  [facility]           [] Phone: [phone]   Fax: [phone]      To: Ramon Glez, *     From: Aviva Hyde PT, PT     Patient: Yahir Parada Sr.                    : 1945  Diagnosis:  Lumbosacral radiculopathy at L5 [M54.17]  Mild cognitive impairment with memory loss [G31.84]  Ataxic gait [R26.0]        Treatment Diagnosis:       Date: 7/10/2023  [x]  Progress Note                []  Discharge Note    Evaluation Date:  23   Total Visits to date:   25 Cancels/No-shows to date:  0    Subjective:  Jayden reports he wanted to work in the yard yesterday and he couldn't. His foot is still not \"listening. \" He has an appointment with cardiology tomorrow and upcoming appointment with neurology. Plan of Care/Treatment to date:  [x] Therapeutic Exercise    [] Modalities:  [x] Therapeutic Activity     [] Ultrasound  [] Electrical Stimulation  [x] Gait Training      [] Cervical Traction   [] Lumbar Traction  [x] Neuromuscular Re-education  [] Cold/hotpack [] Iontophoresis  [x] Instruction in HEP      Other:  [] Manual Therapy       []  Vasopneumatic  [] Aquatic Therapy       []   Dry Needle Therapy                      Objective/Significant Findings At Last Visit/Comments:      360 deg turn: R side, festination/shuffling pattern, going to the L side no issues >4 seconds    TINEO BALANCE: 40/56    5x STS: 32 seconds with UE assist    Tandem Stance: 30 seconds with min sway, EO  Close Stance: EO/EC min to no sway, 30 seconds    ABC: 62% confidence    Assessment:   Betzy Boateng has completed 18 visits since the start of therapy on 23. Has not fallen since the start of therapy, but has frequent close calls with LOB at home. Has not been using a cane per recommendation. Continues to have difficulties with ambulation due to freezing-like episodes during gait, especially with turning.  This is his

## 2023-07-11 LAB
EKG ATRIAL RATE: 83 BPM
EKG DIAGNOSIS: NORMAL
EKG P AXIS: 5 DEGREES
EKG P-R INTERVAL: 180 MS
EKG Q-T INTERVAL: 384 MS
EKG QRS DURATION: 132 MS
EKG QTC CALCULATION (BAZETT): 451 MS
EKG R AXIS: -57 DEGREES
EKG T AXIS: 16 DEGREES
EKG VENTRICULAR RATE: 83 BPM

## 2023-07-11 PROCEDURE — 93010 ELECTROCARDIOGRAM REPORT: CPT | Performed by: INTERNAL MEDICINE

## 2023-07-12 ENCOUNTER — INITIAL CONSULT (OUTPATIENT)
Dept: CARDIOLOGY CLINIC | Age: 78
End: 2023-07-12
Payer: MEDICARE

## 2023-07-12 VITALS
WEIGHT: 162.6 LBS | OXYGEN SATURATION: 96 % | HEART RATE: 86 BPM | BODY MASS INDEX: 26.13 KG/M2 | DIASTOLIC BLOOD PRESSURE: 80 MMHG | SYSTOLIC BLOOD PRESSURE: 140 MMHG | HEIGHT: 66 IN | RESPIRATION RATE: 16 BRPM

## 2023-07-12 DIAGNOSIS — R01.1 MURMUR: ICD-10-CM

## 2023-07-12 DIAGNOSIS — R42 DIZZINESS: Primary | ICD-10-CM

## 2023-07-12 PROCEDURE — 99203 OFFICE O/P NEW LOW 30 MIN: CPT | Performed by: INTERNAL MEDICINE

## 2023-07-12 PROCEDURE — G8427 DOCREV CUR MEDS BY ELIG CLIN: HCPCS | Performed by: INTERNAL MEDICINE

## 2023-07-12 PROCEDURE — G8417 CALC BMI ABV UP PARAM F/U: HCPCS | Performed by: INTERNAL MEDICINE
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
## 2023-07-28 ENCOUNTER — OFFICE VISIT (OUTPATIENT)
Dept: FAMILY MEDICINE CLINIC | Age: 78
End: 2023-07-28
Payer: MEDICARE

## 2023-07-28 VITALS
HEART RATE: 88 BPM | RESPIRATION RATE: 18 BRPM | SYSTOLIC BLOOD PRESSURE: 132 MMHG | OXYGEN SATURATION: 95 % | BODY MASS INDEX: 26.05 KG/M2 | WEIGHT: 161.4 LBS | DIASTOLIC BLOOD PRESSURE: 76 MMHG

## 2023-07-28 DIAGNOSIS — F32.9 REACTIVE DEPRESSION: ICD-10-CM

## 2023-07-28 DIAGNOSIS — R42 DIZZINESS: ICD-10-CM

## 2023-07-28 DIAGNOSIS — R26.9 ABNORMAL GAIT: Primary | ICD-10-CM

## 2023-07-28 DIAGNOSIS — R41.3 MEMORY DIFFICULTIES: ICD-10-CM

## 2023-07-28 DIAGNOSIS — J30.2 SEASONAL ALLERGIES: ICD-10-CM

## 2023-07-28 PROCEDURE — 1036F TOBACCO NON-USER: CPT | Performed by: PHYSICIAN ASSISTANT

## 2023-07-28 PROCEDURE — G8417 CALC BMI ABV UP PARAM F/U: HCPCS | Performed by: PHYSICIAN ASSISTANT

## 2023-07-28 PROCEDURE — 1123F ACP DISCUSS/DSCN MKR DOCD: CPT | Performed by: PHYSICIAN ASSISTANT

## 2023-07-28 PROCEDURE — G8427 DOCREV CUR MEDS BY ELIG CLIN: HCPCS | Performed by: PHYSICIAN ASSISTANT

## 2023-07-28 PROCEDURE — 99214 OFFICE O/P EST MOD 30 MIN: CPT | Performed by: PHYSICIAN ASSISTANT

## 2023-07-28 RX ORDER — FLUTICASONE PROPIONATE 50 MCG
1 SPRAY, SUSPENSION (ML) NASAL DAILY
Qty: 16 G | Refills: 0 | Status: SHIPPED | OUTPATIENT
Start: 2023-07-28

## 2023-07-28 ASSESSMENT — PATIENT HEALTH QUESTIONNAIRE - PHQ9
SUM OF ALL RESPONSES TO PHQ9 QUESTIONS 1 & 2: 5
SUM OF ALL RESPONSES TO PHQ QUESTIONS 1-9: 18
4. FEELING TIRED OR HAVING LITTLE ENERGY: 3
1. LITTLE INTEREST OR PLEASURE IN DOING THINGS: 3
3. TROUBLE FALLING OR STAYING ASLEEP: 1
10. IF YOU CHECKED OFF ANY PROBLEMS, HOW DIFFICULT HAVE THESE PROBLEMS MADE IT FOR YOU TO DO YOUR WORK, TAKE CARE OF THINGS AT HOME, OR GET ALONG WITH OTHER PEOPLE: 2
6. FEELING BAD ABOUT YOURSELF - OR THAT YOU ARE A FAILURE OR HAVE LET YOURSELF OR YOUR FAMILY DOWN: 3
7. TROUBLE CONCENTRATING ON THINGS, SUCH AS READING THE NEWSPAPER OR WATCHING TELEVISION: 3
9. THOUGHTS THAT YOU WOULD BE BETTER OFF DEAD, OR OF HURTING YOURSELF: 3
2. FEELING DOWN, DEPRESSED OR HOPELESS: 2
8. MOVING OR SPEAKING SO SLOWLY THAT OTHER PEOPLE COULD HAVE NOTICED. OR THE OPPOSITE, BEING SO FIGETY OR RESTLESS THAT YOU HAVE BEEN MOVING AROUND A LOT MORE THAN USUAL: 3
SUM OF ALL RESPONSES TO PHQ QUESTIONS 1-9: 21
5. POOR APPETITE OR OVEREATING: 0

## 2023-07-28 ASSESSMENT — COLUMBIA-SUICIDE SEVERITY RATING SCALE - C-SSRS
1. WITHIN THE PAST MONTH, HAVE YOU WISHED YOU WERE DEAD OR WISHED YOU COULD GO TO SLEEP AND NOT WAKE UP?: YES
2. HAVE YOU ACTUALLY HAD ANY THOUGHTS OF KILLING YOURSELF?: NO
6. HAVE YOU EVER DONE ANYTHING, STARTED TO DO ANYTHING, OR PREPARED TO DO ANYTHING TO END YOUR LIFE?: NO

## 2023-07-28 NOTE — PATIENT INSTRUCTIONS
We are committed to providing you the best care possible. If you receive a survey after visiting one of our offices, please take time to share your experience concerning your physician office visit. These surveys are confidential and no health information about you is shared. We are eager to improve for you and continue to give you satisfactory care, we are counting on your feedback to help make that happen. Welcome to [address] [place] [address] and Pediatrics:    Did you know we now have a faster way for you to move through your appointment? For your convenience, we now have digital registration available. When you schedule your next appointment, you will receive a link via your email as well as a text message that will allow you to complete any paperwork digitally before your appointment.
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
## 2023-07-31 PROBLEM — J30.2 SEASONAL ALLERGIES: Status: ACTIVE | Noted: 2023-07-31

## 2023-07-31 PROBLEM — R42 DIZZINESS: Status: ACTIVE | Noted: 2023-07-31

## 2023-07-31 ASSESSMENT — ENCOUNTER SYMPTOMS: RESPIRATORY NEGATIVE: 1

## 2023-07-31 NOTE — ASSESSMENT & PLAN NOTE
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 Has upcoming appt with neuro next week so will try to get MRI completed prior to that appt.

## 2023-08-02 ENCOUNTER — OFFICE VISIT (OUTPATIENT)
Dept: NEUROLOGY | Age: 78
End: 2023-08-02
Payer: MEDICARE

## 2023-08-02 VITALS
HEIGHT: 66 IN | DIASTOLIC BLOOD PRESSURE: 70 MMHG | OXYGEN SATURATION: 95 % | BODY MASS INDEX: 26.03 KG/M2 | HEART RATE: 85 BPM | WEIGHT: 162 LBS | SYSTOLIC BLOOD PRESSURE: 130 MMHG

## 2023-08-02 DIAGNOSIS — E55.9 VITAMIN D DEFICIENCY: ICD-10-CM

## 2023-08-02 DIAGNOSIS — R26.89 MAGNETIC GAIT: Primary | ICD-10-CM

## 2023-08-02 PROCEDURE — G8417 CALC BMI ABV UP PARAM F/U: HCPCS | Performed by: NURSE PRACTITIONER

## 2023-08-02 PROCEDURE — G8427 DOCREV CUR MEDS BY ELIG CLIN: HCPCS | Performed by: NURSE PRACTITIONER

## 2023-08-02 PROCEDURE — 1123F ACP DISCUSS/DSCN MKR DOCD: CPT | Performed by: NURSE PRACTITIONER

## 2023-08-02 PROCEDURE — 99215 OFFICE O/P EST HI 40 MIN: CPT | Performed by: NURSE PRACTITIONER

## 2023-08-02 PROCEDURE — 1036F TOBACCO NON-USER: CPT | Performed by: NURSE PRACTITIONER
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
## 2023-08-03 ENCOUNTER — PROCEDURE VISIT (OUTPATIENT)
Dept: CARDIOLOGY CLINIC | Age: 78
End: 2023-08-03

## 2023-08-03 DIAGNOSIS — R01.1 MURMUR: Primary | ICD-10-CM

## 2023-08-03 DIAGNOSIS — R42 DIZZINESS: Primary | ICD-10-CM

## 2023-08-08 ENCOUNTER — TELEPHONE (OUTPATIENT)
Dept: CARDIOLOGY CLINIC | Age: 78
End: 2023-08-08

## 2023-08-08 NOTE — TELEPHONE ENCOUNTER
Called to patient the results of his recent testing. Echo - Ef 55-60%. Grade I diastolic dysfunction. Mild thickening of leaflets of mitral valve. Carotid doppler - No hemodynamically significant stenosis noted in the internal carotid artery bilaterally. Patient verbalized understanding of all information given.

## 2023-08-09 ENCOUNTER — HOSPITAL ENCOUNTER (OUTPATIENT)
Dept: MRI IMAGING | Age: 78
Discharge: HOME | End: 2023-08-09
Payer: MEDICARE

## 2023-08-09 DIAGNOSIS — R41.3 MEMORY DIFFICULTIES: ICD-10-CM

## 2023-08-09 DIAGNOSIS — R26.9 ABNORMAL GAIT: ICD-10-CM

## 2023-08-09 PROCEDURE — 70551 MRI BRAIN STEM W/O DYE: CPT

## 2023-08-23 ENCOUNTER — TELEPHONE (OUTPATIENT)
Dept: FAMILY MEDICINE CLINIC | Age: 78
End: 2023-08-23

## 2023-08-29 ENCOUNTER — TELEPHONE (OUTPATIENT)
Dept: NEUROLOGY | Age: 78
End: 2023-08-29

## 2023-08-29 DIAGNOSIS — R26.89 MAGNETIC GAIT: Primary | ICD-10-CM

## 2023-08-29 DIAGNOSIS — R90.89 ABNORMAL BRAIN MRI: ICD-10-CM

## 2023-08-29 NOTE — TELEPHONE ENCOUNTER
Pt called stating Paty Gupta had discussed a shot in his back that may help with his walking. Pt stated that when he's walking his legs just stop and throw him forward onto his face. Inquired if he was referring to a lumbar puncture and he stated that may be it. Please advise.

## 2023-08-31 NOTE — TELEPHONE ENCOUNTER
Received call back from pt and informed that information was forwarded to Dr. Alexandre Russell for review and we will call back with recommendations.

## 2023-09-07 NOTE — TELEPHONE ENCOUNTER
Pt called requesting sooner appt. Discussed with Nic Wylie. Informed pt that Nic Wylie is going to order LP. Please place order.

## 2023-09-12 NOTE — TELEPHONE ENCOUNTER
LM for pt to call back to ensure he is aware that the lumbar puncture will need to be coordinated with PT.

## 2023-09-12 NOTE — TELEPHONE ENCOUNTER
Pt called and was asking where he needed to go for the lumbar puncture. Let him know once [facility] schedules it they will call us and we will call him to give him the procedure date & time. He states verbal understanding.

## 2023-09-15 NOTE — PROGRESS NOTES
.IR Procedure at [facility]:  9/20/23 at 1000, arrival 0830    NPO at [address]     (Paracentesis, Thoracentesis, Thyroid Bx and Injections may have a light breakfast)  2. Follow your directions as prescribed by the doctor for your procedure and medications. 3.   Consult your provider as to when to stop blood thinner  4. Do not take any pain medication within 6 hours of your procedure  5. Do not drink any alcoholic beverages or use any street drugs 24 hours before procedure. 6.   Please wear simple, loose fitting clothing to the hospital.  Do not bring valuables (money,             credit cards, checkbooks, etc.)     7. If you  have a Living Will and Durable Power of  for Healthcare, please bring in a copy. 8.   Please bring picture ID,  insurance card, paperwork from the doctors office            (H & P, Consent,  & card for implantable devices). 9.   Report to the information desk on the ground floor. 10. Take a shower the night before or morning of your procedure, do not apply any lotion, oil or powder. 11. If you are going to be sedated for the procedure, you will need a responsible adult to drive you home.

## 2023-09-15 NOTE — TELEPHONE ENCOUNTER
Received a call from central scheduling to let Forrest Ryan know his lumbar puncture is scheduled on 9/20/2023 with an arrival time of 8 am and a start time of 10:00 am. Called pt to inform him. Pt voiced understanding.

## 2023-09-20 ENCOUNTER — HOSPITAL ENCOUNTER (OUTPATIENT)
Dept: GENERAL RADIOLOGY | Age: 78
Discharge: HOME | End: 2023-09-20
Payer: MEDICARE

## 2023-09-20 VITALS
TEMPERATURE: 97.1 F | BODY MASS INDEX: 25.71 KG/M2 | OXYGEN SATURATION: 95 % | HEIGHT: 66 IN | HEART RATE: 69 BPM | RESPIRATION RATE: 16 BRPM | WEIGHT: 160 LBS | SYSTOLIC BLOOD PRESSURE: 138 MMHG | DIASTOLIC BLOOD PRESSURE: 76 MMHG

## 2023-09-20 DIAGNOSIS — R26.89 MAGNETIC GAIT: ICD-10-CM

## 2023-09-20 DIAGNOSIS — R41.3 MEMORY DIFFICULTIES: ICD-10-CM

## 2023-09-20 DIAGNOSIS — R90.89 ABNORMAL BRAIN MRI: ICD-10-CM

## 2023-09-20 DIAGNOSIS — G31.84 MILD COGNITIVE IMPAIRMENT WITH MEMORY LOSS: ICD-10-CM

## 2023-09-20 DIAGNOSIS — R26.9 ABNORMAL GAIT: Primary | ICD-10-CM

## 2023-09-20 LAB
APTT: 30.1 SECONDS (ref 25.1–37.1)
GLUCOSE, CSF: 56 MG/DL (ref 40–75)
HCT VFR BLD CALC: 45 % (ref 42–52)
HEMOGLOBIN: 15 GM/DL (ref 13.5–18)
INR BLD: 1.1 INDEX
LYMPHS CSF: 1 %
MCH RBC QN AUTO: 32.8 PG (ref 27–31)
MCHC RBC AUTO-ENTMCNC: 33.3 % (ref 32–36)
MCV RBC AUTO: 98.5 FL (ref 78–100)
OTHER CELLS, CSF: ABNORMAL %
PDW BLD-RTO: 14 % (ref 11.7–14.9)
PLATELET # BLD: 243 K/CU MM (ref 140–440)
PMV BLD AUTO: 11 FL (ref 7.5–11.1)
PROTEIN CSF: 32 MG/DL (ref 15–45)
PROTHROMBIN TIME: 14 SECONDS (ref 11.7–14.5)
RBC # BLD: 4.57 M/CU MM (ref 4.6–6.2)
RBC CSF: 1 CU MM
SEGS, CSF: 0 %
WBC # BLD: 7.3 K/CU MM (ref 4–10.5)
WBC CSF: 1 CU MM (ref 0–5)

## 2023-09-20 PROCEDURE — 85730 THROMBOPLASTIN TIME PARTIAL: CPT

## 2023-09-20 PROCEDURE — 85610 PROTHROMBIN TIME: CPT

## 2023-09-20 PROCEDURE — 84157 ASSAY OF PROTEIN OTHER: CPT

## 2023-09-20 PROCEDURE — 62328 DX LMBR SPI PNXR W/FLUOR/CT: CPT

## 2023-09-20 PROCEDURE — 85027 COMPLETE CBC AUTOMATED: CPT

## 2023-09-20 PROCEDURE — 82945 GLUCOSE OTHER FLUID: CPT

## 2023-09-20 PROCEDURE — 89051 BODY FLUID CELL COUNT: CPT

## 2023-09-20 ASSESSMENT — PAIN - FUNCTIONAL ASSESSMENT: PAIN_FUNCTIONAL_ASSESSMENT: 0-10

## 2023-09-20 NOTE — PROGRESS NOTES
Pt [facility] to room [facility]  [address] [email or web address] [place] received, call received from [facility] in x ray, transferred me to Dr Sia Gray, no answer. 1215 pt arrived in [facility] via transport team,  call light placed within reach, side rails up x's 2, pt denies pain at this time, bandaid to lower back dry and intact.

## 2023-09-20 NOTE — DISCHARGE INSTRUCTIONS
Patient Discharge Instructions  Lumbar Puncture      You should rest with your head flat for 10-12 hours after the procedure. You should restrict your activity for 24 hours following the procedure. You should not engage in any strenuous activity, no heavy lifting or bending. A headache is normal for the first day after your test.   You may take Tylenol over the counter as directed for discomfort. You should drink at least 4-6 eight ounce glasses of clear liquids today  . You may place an ice pack on the puncture site for 30 minutes four times per day as needed for discomfort. If you have a headache that persists or is worsening, develop a fever, swelling or redness at the puncture site, or any bleeding or drainage from the puncture site, you should contact the radiologist or your physician immediately. The radiologist or radiology nurse can be contacted at [phone].

## 2023-09-20 NOTE — PROGRESS NOTES
0900 labs drawn per order, sent down to lab. [phone] with lab, states they can't see orders in Epic.  This nurse placed new orders per Dr. Josselin Robertson.

## 2023-09-20 NOTE — PROGRESS NOTES
1315 assisted pt up in room, dressing/bandaid to lower back remains dry and intact. Pt dressed self. 1320 assisted pt to AMB in hallway with cane pt shuffles his feet when he gets close to doorways, furniture,etc. Pt's states he feels like he is steady until he gets \"closed in\"   Gait appears unchanged from prior to procedure. 1325 discharge instructions given to pt, voiced undrestanding.  Pt discharged to main entrance to [facility]

## 2023-09-25 ENCOUNTER — TELEPHONE (OUTPATIENT)
Dept: NEUROLOGY | Age: 78
End: 2023-09-25

## 2023-09-25 NOTE — TELEPHONE ENCOUNTER
Pt called and stated he is still having symptoms after his lumbar puncture. Pt would like to know if this procedure takes a certain amount of time to se results ?

## 2023-10-02 NOTE — TELEPHONE ENCOUNTER
Notified pt that results would be immediate after lumbar puncture and that rx was sent per Tashia Aguilar to help with abnormal gait and freezing episodes. Pt voiced understanding.

## 2023-10-18 ENCOUNTER — OFFICE VISIT (OUTPATIENT)
Dept: INTERNAL MEDICINE CLINIC | Age: 78
End: 2023-10-18
Payer: MEDICARE

## 2023-10-18 VITALS
HEIGHT: 66 IN | OXYGEN SATURATION: 96 % | BODY MASS INDEX: 25.71 KG/M2 | HEART RATE: 67 BPM | TEMPERATURE: 98.2 F | WEIGHT: 160 LBS

## 2023-10-18 DIAGNOSIS — J22 LOWER RESPIRATORY INFECTION (E.G., BRONCHITIS, PNEUMONIA, PNEUMONITIS, PULMONITIS): Primary | ICD-10-CM

## 2023-10-18 PROCEDURE — G8484 FLU IMMUNIZE NO ADMIN: HCPCS | Performed by: NURSE PRACTITIONER

## 2023-10-18 PROCEDURE — G8428 CUR MEDS NOT DOCUMENT: HCPCS | Performed by: NURSE PRACTITIONER

## 2023-10-18 PROCEDURE — 1123F ACP DISCUSS/DSCN MKR DOCD: CPT | Performed by: NURSE PRACTITIONER

## 2023-10-18 PROCEDURE — G8417 CALC BMI ABV UP PARAM F/U: HCPCS | Performed by: NURSE PRACTITIONER

## 2023-10-18 PROCEDURE — 1036F TOBACCO NON-USER: CPT | Performed by: NURSE PRACTITIONER

## 2023-10-18 PROCEDURE — 99213 OFFICE O/P EST LOW 20 MIN: CPT | Performed by: NURSE PRACTITIONER

## 2023-10-18 RX ORDER — AZITHROMYCIN 250 MG/1
250 TABLET, FILM COATED ORAL SEE ADMIN INSTRUCTIONS
Qty: 6 TABLET | Refills: 0 | Status: SHIPPED | OUTPATIENT
Start: 2023-10-18 | End: 2023-10-23

## 2023-10-18 RX ORDER — ALBUTEROL SULFATE 90 UG/1
2 AEROSOL, METERED RESPIRATORY (INHALATION) 4 TIMES DAILY PRN
Qty: 54 G | Refills: 1 | Status: SHIPPED | OUTPATIENT
Start: 2023-10-18

## 2023-10-18 RX ORDER — BENZONATATE 200 MG/1
200 CAPSULE ORAL 3 TIMES DAILY PRN
Qty: 21 CAPSULE | Refills: 0 | Status: SHIPPED | OUTPATIENT
Start: 2023-10-18 | End: 2023-10-25

## 2023-10-18 RX ORDER — PREDNISONE 20 MG/1
40 TABLET ORAL DAILY
Qty: 10 TABLET | Refills: 0 | Status: SHIPPED | OUTPATIENT
Start: 2023-10-18 | End: 2023-10-23

## 2023-10-18 ASSESSMENT — ENCOUNTER SYMPTOMS
CONSTIPATION: 0
VOMITING: 0
SINUS PRESSURE: 0
COUGH: 1
TROUBLE SWALLOWING: 0
RHINORRHEA: 0
SHORTNESS OF BREATH: 1
ABDOMINAL PAIN: 0
SORE THROAT: 0
STRIDOR: 0
SINUS PAIN: 0
NAUSEA: 0
WHEEZING: 1
CHEST TIGHTNESS: 1
VOICE CHANGE: 0
DIARRHEA: 0
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
## 2023-10-19 SDOH — HEALTH STABILITY: PHYSICAL HEALTH: ON AVERAGE, HOW MANY DAYS PER WEEK DO YOU ENGAGE IN MODERATE TO STRENUOUS EXERCISE (LIKE A BRISK WALK)?: 4 DAYS

## 2023-10-19 SDOH — HEALTH STABILITY: PHYSICAL HEALTH: ON AVERAGE, HOW MANY MINUTES DO YOU ENGAGE IN EXERCISE AT THIS LEVEL?: 30 MIN

## 2023-10-19 ASSESSMENT — PATIENT HEALTH QUESTIONNAIRE - PHQ9
6. FEELING BAD ABOUT YOURSELF - OR THAT YOU ARE A FAILURE OR HAVE LET YOURSELF OR YOUR FAMILY DOWN: 1
10. IF YOU CHECKED OFF ANY PROBLEMS, HOW DIFFICULT HAVE THESE PROBLEMS MADE IT FOR YOU TO DO YOUR WORK, TAKE CARE OF THINGS AT HOME, OR GET ALONG WITH OTHER PEOPLE: 1
5. POOR APPETITE OR OVEREATING: 1
SUM OF ALL RESPONSES TO PHQ QUESTIONS 1-9: 9
SUM OF ALL RESPONSES TO PHQ9 QUESTIONS 1 & 2: 4
1. LITTLE INTEREST OR PLEASURE IN DOING THINGS: 3
3. TROUBLE FALLING OR STAYING ASLEEP: 1
2. FEELING DOWN, DEPRESSED OR HOPELESS: 1
9. THOUGHTS THAT YOU WOULD BE BETTER OFF DEAD, OR OF HURTING YOURSELF: 0
SUM OF ALL RESPONSES TO PHQ QUESTIONS 1-9: 9
8. MOVING OR SPEAKING SO SLOWLY THAT OTHER PEOPLE COULD HAVE NOTICED. OR THE OPPOSITE, BEING SO FIGETY OR RESTLESS THAT YOU HAVE BEEN MOVING AROUND A LOT MORE THAN USUAL: 0
7. TROUBLE CONCENTRATING ON THINGS, SUCH AS READING THE NEWSPAPER OR WATCHING TELEVISION: 0
SUM OF ALL RESPONSES TO PHQ QUESTIONS 1-9: 9
SUM OF ALL RESPONSES TO PHQ QUESTIONS 1-9: 9
4. FEELING TIRED OR HAVING LITTLE ENERGY: 2

## 2023-10-19 ASSESSMENT — LIFESTYLE VARIABLES
HOW OFTEN DO YOU HAVE A DRINK CONTAINING ALCOHOL: NEVER
HOW OFTEN DO YOU HAVE A DRINK CONTAINING ALCOHOL: 1
HOW OFTEN DO YOU HAVE SIX OR MORE DRINKS ON ONE OCCASION: 1
HOW MANY STANDARD DRINKS CONTAINING ALCOHOL DO YOU HAVE ON A TYPICAL DAY: 0
HOW MANY STANDARD DRINKS CONTAINING ALCOHOL DO YOU HAVE ON A TYPICAL DAY: PATIENT DOES NOT DRINK

## 2023-10-30 ENCOUNTER — OFFICE VISIT (OUTPATIENT)
Dept: FAMILY MEDICINE CLINIC | Age: 78
End: 2023-10-30
Payer: MEDICARE

## 2023-10-30 VITALS
HEART RATE: 78 BPM | RESPIRATION RATE: 20 BRPM | OXYGEN SATURATION: 96 % | DIASTOLIC BLOOD PRESSURE: 60 MMHG | BODY MASS INDEX: 26.31 KG/M2 | WEIGHT: 163 LBS | SYSTOLIC BLOOD PRESSURE: 100 MMHG

## 2023-10-30 DIAGNOSIS — Z00.00 MEDICARE ANNUAL WELLNESS VISIT, SUBSEQUENT: Primary | ICD-10-CM

## 2023-10-30 PROCEDURE — G8484 FLU IMMUNIZE NO ADMIN: HCPCS | Performed by: PHYSICIAN ASSISTANT

## 2023-10-30 PROCEDURE — 1123F ACP DISCUSS/DSCN MKR DOCD: CPT | Performed by: PHYSICIAN ASSISTANT

## 2023-10-30 PROCEDURE — G0439 PPPS, SUBSEQ VISIT: HCPCS | Performed by: PHYSICIAN ASSISTANT

## 2023-10-30 NOTE — PROGRESS NOTES
Breathing, Swallowing, Hoarseness Of Voice\"    Oxycodone-Acetaminophen Itching    Percocet [Oxycodone-Acetaminophen] Itching     \"Feels Like Bugs Crawling On Me\"     Prior to Visit Medications    Medication Sig Taking?  Authorizing Provider   albuterol sulfate HFA (VENTOLIN HFA) 108 (90 Base) MCG/ACT inhaler Inhale 2 puffs into the lungs 4 times daily as needed for Wheezing Yes Santana Graves APRN - NP   prochlorperazine (COMPAZINE) 5 MG tablet Take 1-2 tablets by mouth every 6 hours as needed (hiccups) Yes Sanford, Marylen Donalds, PA   Chlorpheniramine Maleate (ALLERGY PO) Take by mouth Yes ProviderBuzz MD   Cholecalciferol (VITAMIN D) 125 MCG (5000 UT) CAPS Take 5,000 Units by mouth daily Yes JULIA Collins CNP   tamsulosin (FLOMAX) 0.4 MG capsule Take 1 capsule by mouth daily Yes JORDAN Hernadez   Multiple Vitamins-Minerals (ICAPS AREDS 2 PO) Take by mouth 2 times daily Yes ProviderBuzz MD   carbidopa-levodopa (SINEMET)  MG per tablet Take 1 tablet by mouth 3 times daily  Patient not taking: Reported on 10/30/2023  JULIA Collins CNP       CareTeam (Including outside providers/suppliers regularly involved in providing care):   Patient Care Team:  Shawna Gonzalez PA-C as PCP - General (Physician Assistant)  Shawna Gonzalez PA-C as PCP - Empaneled Provider     Reviewed and updated this visit:  Tobacco  Allergies  Meds  Problems  Med Hx  Surg Hx  Soc Hx  Fam Hx

## 2023-10-30 NOTE — ASSESSMENT & PLAN NOTE
Pt will f/u with specialists as scheduled, declined EMG for leg issues today -states he will discuss with neuro first.

## 2023-11-02 ENCOUNTER — OFFICE VISIT (OUTPATIENT)
Dept: NEUROLOGY | Age: 78
End: 2023-11-02
Payer: MEDICARE

## 2023-11-02 VITALS
OXYGEN SATURATION: 98 % | HEIGHT: 66 IN | DIASTOLIC BLOOD PRESSURE: 72 MMHG | BODY MASS INDEX: 25.75 KG/M2 | HEART RATE: 82 BPM | WEIGHT: 160.2 LBS | SYSTOLIC BLOOD PRESSURE: 128 MMHG

## 2023-11-02 DIAGNOSIS — G60.9 IDIOPATHIC PERIPHERAL NEUROPATHY: ICD-10-CM

## 2023-11-02 DIAGNOSIS — R26.0 ATAXIC GAIT: ICD-10-CM

## 2023-11-02 DIAGNOSIS — R41.89 PSEUDODEMENTIA: Primary | ICD-10-CM

## 2023-11-02 PROCEDURE — G8417 CALC BMI ABV UP PARAM F/U: HCPCS | Performed by: NURSE PRACTITIONER

## 2023-11-02 PROCEDURE — 99213 OFFICE O/P EST LOW 20 MIN: CPT | Performed by: NURSE PRACTITIONER

## 2023-11-02 PROCEDURE — 1123F ACP DISCUSS/DSCN MKR DOCD: CPT | Performed by: NURSE PRACTITIONER

## 2023-11-02 PROCEDURE — G8484 FLU IMMUNIZE NO ADMIN: HCPCS | Performed by: NURSE PRACTITIONER

## 2023-11-02 PROCEDURE — G8427 DOCREV CUR MEDS BY ELIG CLIN: HCPCS | Performed by: NURSE PRACTITIONER

## 2023-11-02 PROCEDURE — 1036F TOBACCO NON-USER: CPT | Performed by: NURSE PRACTITIONER

## 2023-11-02 RX ORDER — BENZONATATE 200 MG/1
200 CAPSULE ORAL 3 TIMES DAILY PRN
COMMUNITY

## 2023-11-02 RX ORDER — OXYBUTYNIN CHLORIDE 5 MG/1
5 TABLET, EXTENDED RELEASE ORAL DAILY
COMMUNITY
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
## 2023-11-29 PROBLEM — Z00.00 MEDICARE ANNUAL WELLNESS VISIT, SUBSEQUENT: Status: RESOLVED | Noted: 2023-10-30 | Resolved: 2023-11-29

## 2023-12-13 ENCOUNTER — APPOINTMENT (OUTPATIENT)
Dept: GENERAL RADIOLOGY | Age: 78
End: 2023-12-13
Attending: STUDENT IN AN ORGANIZED HEALTH CARE EDUCATION/TRAINING PROGRAM
Payer: MEDICARE

## 2023-12-13 ENCOUNTER — APPOINTMENT (OUTPATIENT)
Dept: CT IMAGING | Age: 78
End: 2023-12-13
Attending: STUDENT IN AN ORGANIZED HEALTH CARE EDUCATION/TRAINING PROGRAM
Payer: MEDICARE

## 2023-12-13 ENCOUNTER — HOSPITAL ENCOUNTER (EMERGENCY)
Age: 78
Discharge: HOME | End: 2023-12-13
Attending: EMERGENCY MEDICINE
Payer: MEDICARE

## 2023-12-13 VITALS
SYSTOLIC BLOOD PRESSURE: 132 MMHG | HEART RATE: 80 BPM | BODY MASS INDEX: 25.71 KG/M2 | DIASTOLIC BLOOD PRESSURE: 83 MMHG | RESPIRATION RATE: 18 BRPM | OXYGEN SATURATION: 97 % | HEIGHT: 66 IN | WEIGHT: 160 LBS | TEMPERATURE: 98.3 F

## 2023-12-13 DIAGNOSIS — S61.412A LACERATION OF LEFT HAND WITHOUT FOREIGN BODY, INITIAL ENCOUNTER: ICD-10-CM

## 2023-12-13 DIAGNOSIS — S01.81XA FACIAL LACERATION, INITIAL ENCOUNTER: ICD-10-CM

## 2023-12-13 DIAGNOSIS — S09.90XA CLOSED HEAD INJURY, INITIAL ENCOUNTER: Primary | ICD-10-CM

## 2023-12-13 PROCEDURE — 73110 X-RAY EXAM OF WRIST: CPT

## 2023-12-13 PROCEDURE — 72125 CT NECK SPINE W/O DYE: CPT

## 2023-12-13 PROCEDURE — 99284 EMERGENCY DEPT VISIT MOD MDM: CPT

## 2023-12-13 PROCEDURE — 2500000003 HC RX 250 WO HCPCS: Performed by: EMERGENCY MEDICINE

## 2023-12-13 PROCEDURE — 12013 RPR F/E/E/N/L/M 2.6-5.0 CM: CPT

## 2023-12-13 PROCEDURE — 12002 RPR S/N/AX/GEN/TRNK2.6-7.5CM: CPT

## 2023-12-13 PROCEDURE — 73130 X-RAY EXAM OF HAND: CPT

## 2023-12-13 PROCEDURE — 70450 CT HEAD/BRAIN W/O DYE: CPT

## 2023-12-13 RX ORDER — LIDOCAINE HYDROCHLORIDE 10 MG/ML
5 INJECTION, SOLUTION INFILTRATION; PERINEURAL ONCE
Status: COMPLETED | OUTPATIENT
Start: 2023-12-13 | End: 2023-12-13

## 2023-12-13 RX ADMIN — LIDOCAINE HYDROCHLORIDE 5 ML: 10 INJECTION, SOLUTION INFILTRATION; PERINEURAL at 20:19

## 2023-12-13 ASSESSMENT — PAIN DESCRIPTION - INTENSITY: RATING_2: 10

## 2023-12-13 ASSESSMENT — PAIN DESCRIPTION - FREQUENCY: FREQUENCY: CONTINUOUS

## 2023-12-13 ASSESSMENT — PAIN DESCRIPTION - LOCATION
LOCATION_2: HAND
LOCATION: HEAD

## 2023-12-13 ASSESSMENT — PAIN - FUNCTIONAL ASSESSMENT
PAIN_FUNCTIONAL_ASSESSMENT: 0-10
PAIN_FUNCTIONAL_ASSESSMENT_SITE2: PREVENTS OR INTERFERES SOME ACTIVE ACTIVITIES AND ADLS

## 2023-12-13 ASSESSMENT — LIFESTYLE VARIABLES
HOW OFTEN DO YOU HAVE A DRINK CONTAINING ALCOHOL: NEVER
HOW MANY STANDARD DRINKS CONTAINING ALCOHOL DO YOU HAVE ON A TYPICAL DAY: PATIENT DOES NOT DRINK

## 2023-12-13 ASSESSMENT — PAIN DESCRIPTION - PAIN TYPE: TYPE: ACUTE PAIN

## 2023-12-13 ASSESSMENT — PAIN SCALES - GENERAL: PAINLEVEL_OUTOF10: 2

## 2023-12-13 ASSESSMENT — PAIN DESCRIPTION - DESCRIPTORS: DESCRIPTORS: SORE

## 2023-12-14 NOTE — ED PROVIDER NOTES
initial encounter    2. Facial laceration, initial encounter    3.  Laceration of left hand without foreign body, initial encounter      (Please note that portions of this note may have been completed with a voice recognition program. Efforts were made to edit the dictations but occasionally words are mis-transcribed.)    MD Kristofer Lua MD  12/13/23 [id number]

## 2023-12-14 NOTE — ED NOTES
Pt walks to the BR with cane, gait slow and unsteady at first.     Andra Gunderson RN  12/13/23 2018

## 2024-01-24 ENCOUNTER — OFFICE VISIT (OUTPATIENT)
Dept: CARDIOLOGY CLINIC | Age: 79
End: 2024-01-24
Payer: COMMERCIAL

## 2024-01-24 VITALS
BODY MASS INDEX: 27.16 KG/M2 | HEIGHT: 66 IN | WEIGHT: 169 LBS | SYSTOLIC BLOOD PRESSURE: 116 MMHG | HEART RATE: 88 BPM | DIASTOLIC BLOOD PRESSURE: 64 MMHG

## 2024-01-24 DIAGNOSIS — R26.81 UNSTEADY GAIT: ICD-10-CM

## 2024-01-24 DIAGNOSIS — K21.9 CHRONIC GERD: ICD-10-CM

## 2024-01-24 DIAGNOSIS — R42 DIZZINESS: ICD-10-CM

## 2024-01-24 DIAGNOSIS — R06.02 SHORTNESS OF BREATH: Primary | ICD-10-CM

## 2024-01-24 PROCEDURE — 99214 OFFICE O/P EST MOD 30 MIN: CPT | Performed by: INTERNAL MEDICINE

## 2024-01-24 PROCEDURE — 1123F ACP DISCUSS/DSCN MKR DOCD: CPT | Performed by: INTERNAL MEDICINE
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
## 2024-01-25 ENCOUNTER — TELEPHONE (OUTPATIENT)
Dept: PULMONOLOGY | Age: 79
End: 2024-01-25

## 2024-01-25 NOTE — TELEPHONE ENCOUNTER
LVM requesting patient to call office to schedule an appt from a referral.  First attempt to reach patient

## 2024-03-01 ENCOUNTER — TELEPHONE (OUTPATIENT)
Dept: PULMONOLOGY | Age: 79
End: 2024-03-01

## 2024-03-01 NOTE — TELEPHONE ENCOUNTER
Called patient to see if he would like to move his new patient appt from 4/9/24 to 3/11/2024. CHARLES

## 2024-03-06 ENCOUNTER — OFFICE VISIT (OUTPATIENT)
Age: 79
End: 2024-03-06
Payer: COMMERCIAL

## 2024-03-06 VITALS
HEART RATE: 74 BPM | SYSTOLIC BLOOD PRESSURE: 130 MMHG | RESPIRATION RATE: 18 BRPM | DIASTOLIC BLOOD PRESSURE: 68 MMHG | OXYGEN SATURATION: 98 % | BODY MASS INDEX: 26.95 KG/M2 | WEIGHT: 167 LBS

## 2024-03-06 DIAGNOSIS — R26.9 ABNORMAL GAIT: ICD-10-CM

## 2024-03-06 DIAGNOSIS — F32.9 REACTIVE DEPRESSION: ICD-10-CM

## 2024-03-06 DIAGNOSIS — R42 DIZZINESS: Primary | ICD-10-CM

## 2024-03-06 PROCEDURE — 1123F ACP DISCUSS/DSCN MKR DOCD: CPT | Performed by: PHYSICIAN ASSISTANT

## 2024-03-06 PROCEDURE — 99214 OFFICE O/P EST MOD 30 MIN: CPT | Performed by: PHYSICIAN ASSISTANT

## 2024-03-06 RX ORDER — FLUOXETINE HYDROCHLORIDE 20 MG/1
20 CAPSULE ORAL DAILY
Qty: 30 CAPSULE | Refills: 3 | Status: SHIPPED | OUTPATIENT
Start: 2024-03-06

## 2024-03-06 ASSESSMENT — PATIENT HEALTH QUESTIONNAIRE - PHQ9
1. LITTLE INTEREST OR PLEASURE IN DOING THINGS: 0
SUM OF ALL RESPONSES TO PHQ QUESTIONS 1-9: 7
10. IF YOU CHECKED OFF ANY PROBLEMS, HOW DIFFICULT HAVE THESE PROBLEMS MADE IT FOR YOU TO DO YOUR WORK, TAKE CARE OF THINGS AT HOME, OR GET ALONG WITH OTHER PEOPLE: 2
6. FEELING BAD ABOUT YOURSELF - OR THAT YOU ARE A FAILURE OR HAVE LET YOURSELF OR YOUR FAMILY DOWN: 0
8. MOVING OR SPEAKING SO SLOWLY THAT OTHER PEOPLE COULD HAVE NOTICED. OR THE OPPOSITE, BEING SO FIGETY OR RESTLESS THAT YOU HAVE BEEN MOVING AROUND A LOT MORE THAN USUAL: 3
5. POOR APPETITE OR OVEREATING: 0
4. FEELING TIRED OR HAVING LITTLE ENERGY: 0
3. TROUBLE FALLING OR STAYING ASLEEP: 1
SUM OF ALL RESPONSES TO PHQ QUESTIONS 1-9: 7
SUM OF ALL RESPONSES TO PHQ QUESTIONS 1-9: 7
7. TROUBLE CONCENTRATING ON THINGS, SUCH AS READING THE NEWSPAPER OR WATCHING TELEVISION: 0
SUM OF ALL RESPONSES TO PHQ9 QUESTIONS 1 & 2: 3
9. THOUGHTS THAT YOU WOULD BE BETTER OFF DEAD, OR OF HURTING YOURSELF: 0
2. FEELING DOWN, DEPRESSED OR HOPELESS: 3
SUM OF ALL RESPONSES TO PHQ QUESTIONS 1-9: 7
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
## 2024-03-06 NOTE — PATIENT INSTRUCTIONS
We are committed to providing you the best care possible.    If you receive a survey after visiting one of our offices, please take time to share your experience concerning your physician office visit.  These surveys are confidential and no health information about you is shared.    We are eager to improve for you and continue to give you satisfactory care, we are counting on your feedback to help make that happen.            Welcome to [facility]:    Did you know we now have a faster way for you to move through your appointment? For your convenience, we now have digital registration available. When you schedule your next appointment, you will receive a link via your email as well as a text message that will allow you to complete any paperwork digitally before your appointment.

## 2024-03-08 ASSESSMENT — ENCOUNTER SYMPTOMS
WHEEZING: 0
COUGH: 0
ABDOMINAL PAIN: 0

## 2024-03-08 NOTE — ASSESSMENT & PLAN NOTE
Reviewed recommendations by neurology to consider antidepressant. Pt reluctant to start medication but is willing to try. Risks/benefits/SE reviewed, pt voices understanding  Recheck in 6-8 weeks

## 2024-03-08 NOTE — ASSESSMENT & PLAN NOTE
F/u with cardiology negative for abnormal findings with echo/carotid dopplers. Will continue f/u as scheduled.

## 2024-03-11 ENCOUNTER — OFFICE VISIT (OUTPATIENT)
Dept: PULMONOLOGY | Age: 79
End: 2024-03-11
Payer: COMMERCIAL

## 2024-03-11 VITALS
DIASTOLIC BLOOD PRESSURE: 68 MMHG | WEIGHT: 167 LBS | HEIGHT: 66 IN | BODY MASS INDEX: 26.84 KG/M2 | OXYGEN SATURATION: 95 % | HEART RATE: 82 BPM | SYSTOLIC BLOOD PRESSURE: 124 MMHG

## 2024-03-11 DIAGNOSIS — R06.02 SOBOE (SHORTNESS OF BREATH ON EXERTION): Primary | ICD-10-CM

## 2024-03-11 DIAGNOSIS — Z87.891 EX-CIGARETTE SMOKER: ICD-10-CM

## 2024-03-11 DIAGNOSIS — E66.3 OVERWEIGHT (BMI 25.0-29.9): ICD-10-CM

## 2024-03-11 PROCEDURE — 99204 OFFICE O/P NEW MOD 45 MIN: CPT | Performed by: INTERNAL MEDICINE

## 2024-03-11 PROCEDURE — 1123F ACP DISCUSS/DSCN MKR DOCD: CPT | Performed by: INTERNAL MEDICINE

## 2024-03-11 ASSESSMENT — SLEEP AND FATIGUE QUESTIONNAIRES
HOW LIKELY ARE YOU TO NOD OFF OR FALL ASLEEP WHILE LYING DOWN TO REST IN THE AFTERNOON WHEN CIRCUMSTANCES PERMIT: 0
HOW LIKELY ARE YOU TO NOD OFF OR FALL ASLEEP WHEN YOU ARE A PASSENGER IN A CAR FOR AN HOUR WITHOUT A BREAK: 2
HOW LIKELY ARE YOU TO NOD OFF OR FALL ASLEEP WHILE SITTING QUIETLY AFTER LUNCH WITHOUT ALCOHOL: 0
ESS TOTAL SCORE: 2
HOW LIKELY ARE YOU TO NOD OFF OR FALL ASLEEP WHILE WATCHING TV: 0
HOW LIKELY ARE YOU TO NOD OFF OR FALL ASLEEP WHILE SITTING INACTIVE IN A PUBLIC PLACE: 0
HOW LIKELY ARE YOU TO NOD OFF OR FALL ASLEEP IN A CAR, WHILE STOPPED FOR A FEW MINUTES IN TRAFFIC: 0
HOW LIKELY ARE YOU TO NOD OFF OR FALL ASLEEP WHILE SITTING AND READING: 0
HOW LIKELY ARE YOU TO NOD OFF OR FALL ASLEEP WHILE SITTING AND TALKING TO SOMEONE: 0

## 2024-03-11 ASSESSMENT — ENCOUNTER SYMPTOMS
BACK PAIN: 0
SHORTNESS OF BREATH: 1
EYE DISCHARGE: 0
ABDOMINAL DISTENTION: 0
COUGH: 1
ABDOMINAL PAIN: 0
EYE ITCHING: 0
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
## 2024-04-02 ENCOUNTER — HOSPITAL ENCOUNTER (OUTPATIENT)
Dept: CARDIAC REHAB | Age: 79
Discharge: HOME | End: 2024-04-02
Payer: MEDICARE

## 2024-04-02 DIAGNOSIS — Z87.891 EX-CIGARETTE SMOKER: ICD-10-CM

## 2024-04-02 PROCEDURE — 94060 EVALUATION OF WHEEZING: CPT

## 2024-04-02 PROCEDURE — 94729 DIFFUSING CAPACITY: CPT

## 2024-04-02 PROCEDURE — 94727 GAS DIL/WSHOT DETER LNG VOL: CPT

## 2024-04-02 PROCEDURE — 94640 AIRWAY INHALATION TREATMENT: CPT

## 2024-04-15 DIAGNOSIS — R35.0 BENIGN PROSTATIC HYPERPLASIA WITH URINARY FREQUENCY: ICD-10-CM

## 2024-04-15 DIAGNOSIS — N40.1 BENIGN PROSTATIC HYPERPLASIA WITH URINARY FREQUENCY: ICD-10-CM

## 2024-04-15 RX ORDER — TAMSULOSIN HYDROCHLORIDE 0.4 MG/1
0.4 CAPSULE ORAL DAILY
Qty: 90 CAPSULE | Refills: 1 | Status: CANCELLED | OUTPATIENT
Start: 2024-04-15

## 2024-04-15 NOTE — TELEPHONE ENCOUNTER
Called patient and unable to reach .   Left voice message for patient to return call to the office

## 2024-04-15 NOTE — TELEPHONE ENCOUNTER
Patient came into the office, states the last medication that YONG Albrecht prescribed him makes him sick to his stomach. Patient does not know the name of the medication. Please advise.

## 2024-04-17 ENCOUNTER — TELEPHONE (OUTPATIENT)
Age: 79
End: 2024-04-17

## 2024-04-18 DIAGNOSIS — N40.1 BENIGN PROSTATIC HYPERPLASIA WITH URINARY FREQUENCY: ICD-10-CM

## 2024-04-18 DIAGNOSIS — R35.0 BENIGN PROSTATIC HYPERPLASIA WITH URINARY FREQUENCY: ICD-10-CM

## 2024-04-18 RX ORDER — TAMSULOSIN HYDROCHLORIDE 0.4 MG/1
0.4 CAPSULE ORAL DAILY
Qty: 90 CAPSULE | Refills: 1 | Status: SHIPPED | OUTPATIENT
Start: 2024-04-18

## 2024-04-18 NOTE — TELEPHONE ENCOUNTER
Called patient and unable to reach.   Left voice message for patient to call the office

## 2024-05-15 ENCOUNTER — OFFICE VISIT (OUTPATIENT)
Age: 79
End: 2024-05-15
Payer: MEDICARE

## 2024-05-15 VITALS
OXYGEN SATURATION: 97 % | HEART RATE: 76 BPM | BODY MASS INDEX: 26.55 KG/M2 | SYSTOLIC BLOOD PRESSURE: 128 MMHG | RESPIRATION RATE: 18 BRPM | WEIGHT: 165.2 LBS | DIASTOLIC BLOOD PRESSURE: 76 MMHG | HEIGHT: 66 IN

## 2024-05-15 DIAGNOSIS — R41.89 PSEUDODEMENTIA: Primary | ICD-10-CM

## 2024-05-15 DIAGNOSIS — R26.0 ATAXIC GAIT: ICD-10-CM

## 2024-05-15 PROCEDURE — 1123F ACP DISCUSS/DSCN MKR DOCD: CPT | Performed by: PHYSICIAN ASSISTANT

## 2024-05-15 PROCEDURE — 1036F TOBACCO NON-USER: CPT | Performed by: PHYSICIAN ASSISTANT

## 2024-05-15 PROCEDURE — G8417 CALC BMI ABV UP PARAM F/U: HCPCS | Performed by: PHYSICIAN ASSISTANT

## 2024-05-15 PROCEDURE — G8427 DOCREV CUR MEDS BY ELIG CLIN: HCPCS | Performed by: PHYSICIAN ASSISTANT

## 2024-05-15 PROCEDURE — 99213 OFFICE O/P EST LOW 20 MIN: CPT | Performed by: PHYSICIAN ASSISTANT

## 2024-05-15 ASSESSMENT — PATIENT HEALTH QUESTIONNAIRE - PHQ9
5. POOR APPETITE OR OVEREATING: NOT AT ALL
SUM OF ALL RESPONSES TO PHQ QUESTIONS 1-9: 5
SUM OF ALL RESPONSES TO PHQ QUESTIONS 1-9: 4
SUM OF ALL RESPONSES TO PHQ QUESTIONS 1-9: 5
6. FEELING BAD ABOUT YOURSELF - OR THAT YOU ARE A FAILURE OR HAVE LET YOURSELF OR YOUR FAMILY DOWN: SEVERAL DAYS
SUM OF ALL RESPONSES TO PHQ9 QUESTIONS 1 & 2: 0
9. THOUGHTS THAT YOU WOULD BE BETTER OFF DEAD, OR OF HURTING YOURSELF: SEVERAL DAYS
2. FEELING DOWN, DEPRESSED OR HOPELESS: NOT AT ALL
SUM OF ALL RESPONSES TO PHQ QUESTIONS 1-9: 5
3. TROUBLE FALLING OR STAYING ASLEEP: NOT AT ALL
4. FEELING TIRED OR HAVING LITTLE ENERGY: NOT AT ALL
7. TROUBLE CONCENTRATING ON THINGS, SUCH AS READING THE NEWSPAPER OR WATCHING TELEVISION: NOT AT ALL
10. IF YOU CHECKED OFF ANY PROBLEMS, HOW DIFFICULT HAVE THESE PROBLEMS MADE IT FOR YOU TO DO YOUR WORK, TAKE CARE OF THINGS AT HOME, OR GET ALONG WITH OTHER PEOPLE: VERY DIFFICULT
1. LITTLE INTEREST OR PLEASURE IN DOING THINGS: NOT AT ALL
8. MOVING OR SPEAKING SO SLOWLY THAT OTHER PEOPLE COULD HAVE NOTICED. OR THE OPPOSITE, BEING SO FIGETY OR RESTLESS THAT YOU HAVE BEEN MOVING AROUND A LOT MORE THAN USUAL: NEARLY EVERY DAY

## 2024-05-15 ASSESSMENT — COLUMBIA-SUICIDE SEVERITY RATING SCALE - C-SSRS
2. HAVE YOU ACTUALLY HAD ANY THOUGHTS OF KILLING YOURSELF?: NO
6. HAVE YOU EVER DONE ANYTHING, STARTED TO DO ANYTHING, OR PREPARED TO DO ANYTHING TO END YOUR LIFE?: NO
1. WITHIN THE PAST MONTH, HAVE YOU WISHED YOU WERE DEAD OR WISHED YOU COULD GO TO SLEEP AND NOT WAKE UP?: NO
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
## 2024-05-21 PROBLEM — R41.89 PSEUDODEMENTIA: Status: ACTIVE | Noted: 2024-05-21

## 2024-05-21 PROBLEM — R26.0 ATAXIC GAIT: Status: ACTIVE | Noted: 2024-05-21

## 2024-05-21 ASSESSMENT — ENCOUNTER SYMPTOMS
SHORTNESS OF BREATH: 1
COUGH: 0
WHEEZING: 0

## 2024-05-28 ENCOUNTER — OFFICE VISIT (OUTPATIENT)
Dept: PULMONOLOGY | Age: 79
End: 2024-05-28
Payer: MEDICARE

## 2024-05-28 VITALS
WEIGHT: 165.4 LBS | BODY MASS INDEX: 26.58 KG/M2 | OXYGEN SATURATION: 96 % | DIASTOLIC BLOOD PRESSURE: 64 MMHG | SYSTOLIC BLOOD PRESSURE: 130 MMHG | HEART RATE: 78 BPM | HEIGHT: 66 IN

## 2024-05-28 DIAGNOSIS — R06.02 SOBOE (SHORTNESS OF BREATH ON EXERTION): Primary | ICD-10-CM

## 2024-05-28 DIAGNOSIS — E66.3 OVERWEIGHT (BMI 25.0-29.9): ICD-10-CM

## 2024-05-28 DIAGNOSIS — Z87.891 EX-CIGARETTE SMOKER: ICD-10-CM

## 2024-05-28 PROCEDURE — G8427 DOCREV CUR MEDS BY ELIG CLIN: HCPCS | Performed by: INTERNAL MEDICINE

## 2024-05-28 PROCEDURE — G8417 CALC BMI ABV UP PARAM F/U: HCPCS | Performed by: INTERNAL MEDICINE

## 2024-05-28 PROCEDURE — 1123F ACP DISCUSS/DSCN MKR DOCD: CPT | Performed by: INTERNAL MEDICINE

## 2024-05-28 PROCEDURE — 1036F TOBACCO NON-USER: CPT | Performed by: INTERNAL MEDICINE

## 2024-05-28 PROCEDURE — 99214 OFFICE O/P EST MOD 30 MIN: CPT | Performed by: INTERNAL MEDICINE

## 2024-05-28 ASSESSMENT — ENCOUNTER SYMPTOMS
SHORTNESS OF BREATH: 0
ABDOMINAL PAIN: 0
ABDOMINAL DISTENTION: 0
EYE ITCHING: 0
BACK PAIN: 0
EYE DISCHARGE: 0
COUGH: 0
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
## 2024-06-13 ENCOUNTER — HOSPITAL ENCOUNTER (OUTPATIENT)
Dept: CT IMAGING | Age: 79
Discharge: HOME | End: 2024-06-13
Attending: INTERNAL MEDICINE
Payer: MEDICARE

## 2024-06-13 DIAGNOSIS — R06.02 SOBOE (SHORTNESS OF BREATH ON EXERTION): ICD-10-CM

## 2024-06-13 DIAGNOSIS — Z87.891 EX-CIGARETTE SMOKER: ICD-10-CM

## 2024-06-13 PROCEDURE — 71250 CT THORAX DX C-: CPT

## 2024-06-14 ENCOUNTER — OFFICE VISIT (OUTPATIENT)
Dept: NEUROLOGY | Age: 79
End: 2024-06-14
Payer: MEDICARE

## 2024-06-14 VITALS
HEART RATE: 99 BPM | DIASTOLIC BLOOD PRESSURE: 68 MMHG | WEIGHT: 168.6 LBS | SYSTOLIC BLOOD PRESSURE: 130 MMHG | BODY MASS INDEX: 27.23 KG/M2 | OXYGEN SATURATION: 96 %

## 2024-06-14 DIAGNOSIS — R26.89 MAGNETIC GAIT: Primary | ICD-10-CM

## 2024-06-14 DIAGNOSIS — R90.89 ABNORMAL BRAIN MRI: ICD-10-CM

## 2024-06-14 DIAGNOSIS — G31.84 MILD COGNITIVE IMPAIRMENT WITH MEMORY LOSS: ICD-10-CM

## 2024-06-14 PROCEDURE — G8427 DOCREV CUR MEDS BY ELIG CLIN: HCPCS | Performed by: NURSE PRACTITIONER

## 2024-06-14 PROCEDURE — 1123F ACP DISCUSS/DSCN MKR DOCD: CPT | Performed by: NURSE PRACTITIONER

## 2024-06-14 PROCEDURE — 1036F TOBACCO NON-USER: CPT | Performed by: NURSE PRACTITIONER

## 2024-06-14 PROCEDURE — G8417 CALC BMI ABV UP PARAM F/U: HCPCS | Performed by: NURSE PRACTITIONER

## 2024-06-14 PROCEDURE — 99214 OFFICE O/P EST MOD 30 MIN: CPT | Performed by: NURSE PRACTITIONER
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
## 2024-07-01 ENCOUNTER — OFFICE VISIT (OUTPATIENT)
Dept: PULMONOLOGY | Age: 79
End: 2024-07-01
Payer: MEDICARE

## 2024-07-01 VITALS
OXYGEN SATURATION: 96 % | BODY MASS INDEX: 25.94 KG/M2 | SYSTOLIC BLOOD PRESSURE: 118 MMHG | HEIGHT: 66 IN | WEIGHT: 161.4 LBS | DIASTOLIC BLOOD PRESSURE: 66 MMHG | HEART RATE: 78 BPM

## 2024-07-01 DIAGNOSIS — R06.02 SOBOE (SHORTNESS OF BREATH ON EXERTION): ICD-10-CM

## 2024-07-01 DIAGNOSIS — Z87.891 EX-CIGARETTE SMOKER: ICD-10-CM

## 2024-07-01 DIAGNOSIS — E66.3 OVERWEIGHT (BMI 25.0-29.9): ICD-10-CM

## 2024-07-01 DIAGNOSIS — R91.8 MULTIPLE PULMONARY NODULES: Primary | ICD-10-CM

## 2024-07-01 PROCEDURE — 1123F ACP DISCUSS/DSCN MKR DOCD: CPT | Performed by: INTERNAL MEDICINE

## 2024-07-01 PROCEDURE — G8417 CALC BMI ABV UP PARAM F/U: HCPCS | Performed by: INTERNAL MEDICINE

## 2024-07-01 PROCEDURE — G8427 DOCREV CUR MEDS BY ELIG CLIN: HCPCS | Performed by: INTERNAL MEDICINE

## 2024-07-01 PROCEDURE — 99214 OFFICE O/P EST MOD 30 MIN: CPT | Performed by: INTERNAL MEDICINE

## 2024-07-01 PROCEDURE — 1036F TOBACCO NON-USER: CPT | Performed by: INTERNAL MEDICINE

## 2024-07-01 ASSESSMENT — ENCOUNTER SYMPTOMS
ABDOMINAL PAIN: 0
EYE DISCHARGE: 0
BACK PAIN: 0
SHORTNESS OF BREATH: 0
COUGH: 0
ABDOMINAL DISTENTION: 0
EYE ITCHING: 0

## 2024-07-01 NOTE — ASSESSMENT & PLAN NOTE
He has 3 mm multiple Pulmonary nodules  Since has a 17 pk yr smoking   I'll repeat Low Dose CT chest in 1 year to assess the stability
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
## 2024-07-03 ENCOUNTER — OFFICE VISIT (OUTPATIENT)
Dept: CARDIOLOGY CLINIC | Age: 79
End: 2024-07-03
Payer: MEDICARE

## 2024-07-03 VITALS
SYSTOLIC BLOOD PRESSURE: 134 MMHG | DIASTOLIC BLOOD PRESSURE: 78 MMHG | HEART RATE: 90 BPM | WEIGHT: 160 LBS | HEIGHT: 67 IN | BODY MASS INDEX: 25.11 KG/M2

## 2024-07-03 DIAGNOSIS — R07.9 CHEST PAIN, UNSPECIFIED TYPE: Primary | ICD-10-CM

## 2024-07-03 PROCEDURE — 1036F TOBACCO NON-USER: CPT | Performed by: INTERNAL MEDICINE

## 2024-07-03 PROCEDURE — 1123F ACP DISCUSS/DSCN MKR DOCD: CPT | Performed by: INTERNAL MEDICINE

## 2024-07-03 PROCEDURE — 99214 OFFICE O/P EST MOD 30 MIN: CPT | Performed by: INTERNAL MEDICINE

## 2024-07-03 PROCEDURE — G8417 CALC BMI ABV UP PARAM F/U: HCPCS | Performed by: INTERNAL MEDICINE

## 2024-07-03 PROCEDURE — G8427 DOCREV CUR MEDS BY ELIG CLIN: HCPCS | Performed by: INTERNAL MEDICINE

## 2024-07-03 NOTE — PROGRESS NOTES
Social History     Tobacco Use    Smoking status: Former     Current packs/day: 0.00     Average packs/day: 1 pack/day for 20.0 years (20.0 ttl pk-yrs)     Types: Cigarettes, Pipe     Start date:      Quit date:      Years since quittin.5    Smokeless tobacco: Never   Substance Use Topics    Alcohol use: No     Comment: quit       [unfilled]  Review of Systems:   Constitutional: No Fever or Weight Loss   Eyes: No Decreased Vision  ENT: No Headaches, Hearing Loss or Vertigo  Cardiovascular: No chest pain, dyspnea on exertion, palpitations or loss of consciousness  Respiratory: No cough or wheezing    Gastrointestinal: No abdominal pain, appetite loss, blood in stools, constipation, diarrhea or heartburn  Genitourinary: No dysuria, trouble voiding, or hematuria  Musculoskeletal:  No gait disturbance, weakness or joint complaints  Integumentary: No rash or pruritis  Neurological: No TIA or stroke symptoms  Psychiatric: No anxiety or depression  Endocrine: No malaise, fatigue or temperature intolerance  Hematologic/Lymphatic: No bleeding problems, blood clots or swollen lymph nodes  Allergic/Immunologic: No nasal congestion or hives  All systems negative except as marked.   Objective:  /78   Pulse 90   Ht 1.702 m (5' 7\")   Wt 72.6 kg (160 lb)   BMI 25.06 kg/m²   Wt Readings from Last 3 Encounters:   24 72.6 kg (160 lb)   24 73.2 kg (161 lb 6.4 oz)   24 76.5 kg (168 lb 9.6 oz)     Body mass index is 25.06 kg/m².  GENERAL - Alert, oriented, pleasant, in no apparent distress,normal grooming  HEENT - pupils are intact, cornea intact, conjunctive normal color, ears are normal in exam,  Neck - Supple.  No jugular venous distention noted. No carotid bruits, no apical -carotid delay  Respiratory:  Normal breath sounds, No respiratory distress, No wheezing, No chest tenderness.,no use of accessory muscles, diaphragm movement is normal  Cardiovascular: (PMI) apex non displaced,no

## 2024-07-12 ENCOUNTER — TELEPHONE (OUTPATIENT)
Dept: CARDIOLOGY CLINIC | Age: 79
End: 2024-07-12

## 2024-07-12 NOTE — TELEPHONE ENCOUNTER
Called patient left a message with the results of recent testing.   NM -  Normal pharmacological myocardial perfusion study.

## 2024-07-16 ENCOUNTER — HOSPITAL ENCOUNTER (OUTPATIENT)
Age: 79
Setting detail: SPECIMEN
Discharge: HOME | End: 2024-07-16

## 2024-07-17 ENCOUNTER — HOSPITAL ENCOUNTER (OUTPATIENT)
Age: 79
Discharge: HOME | End: 2024-07-17
Payer: MEDICARE

## 2024-07-17 LAB — PROSTATE SPECIFIC ANTIGEN: 2.39 NG/ML (ref 0–4)

## 2024-07-17 PROCEDURE — 36415 COLL VENOUS BLD VENIPUNCTURE: CPT

## 2024-07-17 PROCEDURE — G0103 PSA SCREENING: HCPCS

## 2024-07-22 NOTE — FLOWSHEET NOTE
Patient and father were educated on the natural course of condition. Take medications as directed. Side effects discussed. Conservative measures discussed including rest, increased fluids, humidifier, and teaspoon of honey. See your primary care provider if symptoms do not improve in 7 days. Seek emergency care if you develop shortness of breath or fever over 103.    
reviewed and discussed with patient. All questions answered. Pt agreed to comply. Manual Treatments:  --      Modalities:  --      Communication with other providers:  tammyal sent 4/25/23      Assessment:  (Response towards treatment session) (Pain Rating)Pt with a good tolerance towards today's treatment session. Pt able to complete all activities without any report of  exacerbation of symptoms. Pt placing weight on the heel of the right foot when stepping up and a forefoot sliding out to right when stepping backward, cues for improved use of the forward weight on the toes and less pull use of the UE helped. Pt would continue to benefit from skilled therapy interventions to increase ROM and strength. Pt rated pain at 0/10 after treatment. Plan for Next Session: Gait training with cane next, he should be bringing his cane        Time In / Time Out:    1030/1115    Timed Code/Total Treatment Minutes:  45'/45' (1)gait, (2)TE    Next Progress Note due:  10th visit      Plan of Care Interventions:  [x] Therapeutic Exercise  [] Modalities:  [x] Therapeutic Activity     [] Ultrasound  [] Estim  [] Gait Training      [] Cervical Traction [] Lumbar Traction  [x] Neuromuscular Re-education    [] Cold/hotpack [] Iontophoresis   [x] Instruction in HEP      [] Vasopneumatic   [] Dry Needling    [x] Manual Therapy               [] Aquatic Therapy              Electronically signed by:   Courtney John PTA  5/12/23 05/12/23, 10:35 AM       5/12/2023,10:35 AM

## 2024-08-06 ENCOUNTER — TELEPHONE (OUTPATIENT)
Dept: NEUROLOGY | Age: 79
End: 2024-08-06

## 2024-09-16 ENCOUNTER — OFFICE VISIT (OUTPATIENT)
Age: 79
End: 2024-09-16

## 2024-09-16 VITALS
DIASTOLIC BLOOD PRESSURE: 74 MMHG | OXYGEN SATURATION: 96 % | HEART RATE: 82 BPM | RESPIRATION RATE: 18 BRPM | WEIGHT: 158 LBS | BODY MASS INDEX: 25.39 KG/M2 | SYSTOLIC BLOOD PRESSURE: 136 MMHG | HEIGHT: 66 IN

## 2024-09-16 DIAGNOSIS — F32.9 REACTIVE DEPRESSION: ICD-10-CM

## 2024-09-16 DIAGNOSIS — R26.9 ABNORMAL GAIT: ICD-10-CM

## 2024-09-16 DIAGNOSIS — R41.89 PSEUDODEMENTIA: ICD-10-CM

## 2024-09-16 DIAGNOSIS — H35.3130 BILATERAL NONEXUDATIVE AGE-RELATED MACULAR DEGENERATION, UNSPECIFIED STAGE: ICD-10-CM

## 2024-09-16 DIAGNOSIS — Z00.00 WELL ADULT EXAM: Primary | ICD-10-CM

## 2024-09-16 RX ORDER — CALCIUM CARBONATE 160(400)MG
TABLET,CHEWABLE ORAL
Qty: 1 EACH | Refills: 0 | Status: SHIPPED | OUTPATIENT
Start: 2024-09-16 | End: 2024-09-16

## 2024-09-16 RX ORDER — CALCIUM CARBONATE 160(400)MG
TABLET,CHEWABLE ORAL
Qty: 1 EACH | Refills: 0 | Status: SHIPPED | OUTPATIENT
Start: 2024-09-16

## 2024-09-16 SDOH — ECONOMIC STABILITY: FOOD INSECURITY: WITHIN THE PAST 12 MONTHS, YOU WORRIED THAT YOUR FOOD WOULD RUN OUT BEFORE YOU GOT MONEY TO BUY MORE.: NEVER TRUE

## 2024-09-16 SDOH — ECONOMIC STABILITY: INCOME INSECURITY: HOW HARD IS IT FOR YOU TO PAY FOR THE VERY BASICS LIKE FOOD, HOUSING, MEDICAL CARE, AND HEATING?: NOT HARD AT ALL

## 2024-09-16 SDOH — ECONOMIC STABILITY: FOOD INSECURITY: WITHIN THE PAST 12 MONTHS, THE FOOD YOU BOUGHT JUST DIDN'T LAST AND YOU DIDN'T HAVE MONEY TO GET MORE.: NEVER TRUE

## 2024-09-16 ASSESSMENT — ENCOUNTER SYMPTOMS
GASTROINTESTINAL NEGATIVE: 1
RESPIRATORY NEGATIVE: 1

## 2024-09-16 ASSESSMENT — PATIENT HEALTH QUESTIONNAIRE - PHQ9
6. FEELING BAD ABOUT YOURSELF - OR THAT YOU ARE A FAILURE OR HAVE LET YOURSELF OR YOUR FAMILY DOWN: NEARLY EVERY DAY
SUM OF ALL RESPONSES TO PHQ QUESTIONS 1-9: 25
7. TROUBLE CONCENTRATING ON THINGS, SUCH AS READING THE NEWSPAPER OR WATCHING TELEVISION: NEARLY EVERY DAY
SUM OF ALL RESPONSES TO PHQ QUESTIONS 1-9: 25
9. THOUGHTS THAT YOU WOULD BE BETTER OFF DEAD, OR OF HURTING YOURSELF: NEARLY EVERY DAY
8. MOVING OR SPEAKING SO SLOWLY THAT OTHER PEOPLE COULD HAVE NOTICED. OR THE OPPOSITE, BEING SO FIGETY OR RESTLESS THAT YOU HAVE BEEN MOVING AROUND A LOT MORE THAN USUAL: NEARLY EVERY DAY
1. LITTLE INTEREST OR PLEASURE IN DOING THINGS: NEARLY EVERY DAY
SUM OF ALL RESPONSES TO PHQ QUESTIONS 1-9: 22
10. IF YOU CHECKED OFF ANY PROBLEMS, HOW DIFFICULT HAVE THESE PROBLEMS MADE IT FOR YOU TO DO YOUR WORK, TAKE CARE OF THINGS AT HOME, OR GET ALONG WITH OTHER PEOPLE: VERY DIFFICULT
4. FEELING TIRED OR HAVING LITTLE ENERGY: NEARLY EVERY DAY
5. POOR APPETITE OR OVEREATING: NEARLY EVERY DAY
SUM OF ALL RESPONSES TO PHQ QUESTIONS 1-9: 25
3. TROUBLE FALLING OR STAYING ASLEEP: SEVERAL DAYS
2. FEELING DOWN, DEPRESSED OR HOPELESS: NEARLY EVERY DAY
SUM OF ALL RESPONSES TO PHQ9 QUESTIONS 1 & 2: 6

## 2024-09-17 ENCOUNTER — TELEPHONE (OUTPATIENT)
Dept: NEUROLOGY | Age: 79
End: 2024-09-17

## 2024-09-17 ENCOUNTER — OFFICE VISIT (OUTPATIENT)
Dept: NEUROLOGY | Age: 79
End: 2024-09-17
Payer: MEDICARE

## 2024-09-17 VITALS
WEIGHT: 159.8 LBS | HEART RATE: 82 BPM | OXYGEN SATURATION: 96 % | BODY MASS INDEX: 25.79 KG/M2 | DIASTOLIC BLOOD PRESSURE: 68 MMHG | SYSTOLIC BLOOD PRESSURE: 118 MMHG

## 2024-09-17 DIAGNOSIS — R90.89 ABNORMAL BRAIN MRI: ICD-10-CM

## 2024-09-17 DIAGNOSIS — G31.84 MILD COGNITIVE IMPAIRMENT WITH MEMORY LOSS: ICD-10-CM

## 2024-09-17 DIAGNOSIS — R26.89 MAGNETIC GAIT: Primary | ICD-10-CM

## 2024-09-17 LAB
ALBUMIN SERPL-MCNC: 4.7 G/DL (ref 3.4–5)
ALBUMIN/GLOB SERPL: 2.2 {RATIO} (ref 1.1–2.2)
ALP SERPL-CCNC: 79 U/L (ref 40–129)
ALT SERPL-CCNC: 16 U/L (ref 10–40)
ANION GAP SERPL CALCULATED.3IONS-SCNC: 11 MMOL/L (ref 3–16)
AST SERPL-CCNC: 23 U/L (ref 15–37)
BILIRUB SERPL-MCNC: 1.1 MG/DL (ref 0–1)
BUN SERPL-MCNC: 15 MG/DL (ref 7–20)
CALCIUM SERPL-MCNC: 10 MG/DL (ref 8.3–10.6)
CHLORIDE SERPL-SCNC: 105 MMOL/L (ref 99–110)
CO2 SERPL-SCNC: 24 MMOL/L (ref 21–32)
CREAT SERPL-MCNC: 1.1 MG/DL (ref 0.8–1.3)
GFR SERPLBLD CREATININE-BSD FMLA CKD-EPI: 67 ML/MIN/{1.73_M2}
GLUCOSE SERPL-MCNC: 92 MG/DL (ref 70–99)
POTASSIUM SERPL-SCNC: 4.8 MMOL/L (ref 3.5–5.1)
PROT SERPL-MCNC: 6.8 G/DL (ref 6.4–8.2)
SODIUM SERPL-SCNC: 140 MMOL/L (ref 136–145)

## 2024-09-17 PROCEDURE — G8427 DOCREV CUR MEDS BY ELIG CLIN: HCPCS | Performed by: NURSE PRACTITIONER

## 2024-09-17 PROCEDURE — 99214 OFFICE O/P EST MOD 30 MIN: CPT | Performed by: NURSE PRACTITIONER

## 2024-09-17 PROCEDURE — 1036F TOBACCO NON-USER: CPT | Performed by: NURSE PRACTITIONER

## 2024-09-17 PROCEDURE — G8417 CALC BMI ABV UP PARAM F/U: HCPCS | Performed by: NURSE PRACTITIONER

## 2024-09-17 PROCEDURE — 1123F ACP DISCUSS/DSCN MKR DOCD: CPT | Performed by: NURSE PRACTITIONER

## 2024-09-24 ENCOUNTER — TELEPHONE (OUTPATIENT)
Dept: NEUROLOGY | Age: 79
End: 2024-09-24

## 2024-10-14 ENCOUNTER — PATIENT MESSAGE (OUTPATIENT)
Dept: NEUROLOGY | Age: 79
End: 2024-10-14

## 2024-10-16 ENCOUNTER — PATIENT MESSAGE (OUTPATIENT)
Dept: NEUROLOGY | Age: 79
End: 2024-10-16

## 2024-10-16 DIAGNOSIS — R26.89 MAGNETIC GAIT: Primary | ICD-10-CM

## 2024-10-16 DIAGNOSIS — R90.89 ABNORMAL BRAIN MRI: ICD-10-CM

## 2024-10-16 NOTE — TELEPHONE ENCOUNTER
I reached out to [facility] central scheduling and they stated the pt just needed to call them to get scheduled. I informed pt and gave him the number. Pt voiced understanding.

## 2024-10-16 NOTE — TELEPHONE ENCOUNTER
Patient called to advise us he still has not heard back from [facility]. He wanted to make sure message was received from 10 minutes ago via [employer]. He said you wanted to be advised if he doesn't hear anything from [facility].

## 2024-10-16 NOTE — TELEPHONE ENCOUNTER
Pt called back and stated that [facility] has a 6m wait list and would like a referral elsewhere. Please advise.

## 2024-10-29 ENCOUNTER — TELEPHONE (OUTPATIENT)
Dept: NEUROLOGY | Age: 79
End: 2024-10-29

## 2024-10-29 NOTE — TELEPHONE ENCOUNTER
Called [facility] to check on referral.  They stated did not receive.  Resent referral to [phone] per their request.

## 2024-12-17 ENCOUNTER — OFFICE VISIT (OUTPATIENT)
Age: 79
End: 2024-12-17
Payer: MEDICARE

## 2024-12-17 VITALS
DIASTOLIC BLOOD PRESSURE: 68 MMHG | BODY MASS INDEX: 25.68 KG/M2 | OXYGEN SATURATION: 96 % | SYSTOLIC BLOOD PRESSURE: 116 MMHG | HEART RATE: 60 BPM | WEIGHT: 159.8 LBS | HEIGHT: 66 IN

## 2024-12-17 DIAGNOSIS — Z00.00 MEDICARE ANNUAL WELLNESS VISIT, SUBSEQUENT: Primary | ICD-10-CM

## 2024-12-17 PROCEDURE — G0439 PPPS, SUBSEQ VISIT: HCPCS

## 2024-12-17 PROCEDURE — 1123F ACP DISCUSS/DSCN MKR DOCD: CPT

## 2024-12-17 PROCEDURE — 1159F MED LIST DOCD IN RCRD: CPT

## 2024-12-17 PROCEDURE — G8484 FLU IMMUNIZE NO ADMIN: HCPCS

## 2024-12-17 PROCEDURE — 1160F RVW MEDS BY RX/DR IN RCRD: CPT

## 2024-12-17 ASSESSMENT — PATIENT HEALTH QUESTIONNAIRE - PHQ9
SUM OF ALL RESPONSES TO PHQ QUESTIONS 1-9: 6
3. TROUBLE FALLING OR STAYING ASLEEP: NOT AT ALL
10. IF YOU CHECKED OFF ANY PROBLEMS, HOW DIFFICULT HAVE THESE PROBLEMS MADE IT FOR YOU TO DO YOUR WORK, TAKE CARE OF THINGS AT HOME, OR GET ALONG WITH OTHER PEOPLE: SOMEWHAT DIFFICULT
SUM OF ALL RESPONSES TO PHQ QUESTIONS 1-9: 6
6. FEELING BAD ABOUT YOURSELF - OR THAT YOU ARE A FAILURE OR HAVE LET YOURSELF OR YOUR FAMILY DOWN: NOT AT ALL
4. FEELING TIRED OR HAVING LITTLE ENERGY: SEVERAL DAYS
SUM OF ALL RESPONSES TO PHQ9 QUESTIONS 1 & 2: 2
7. TROUBLE CONCENTRATING ON THINGS, SUCH AS READING THE NEWSPAPER OR WATCHING TELEVISION: NEARLY EVERY DAY
9. THOUGHTS THAT YOU WOULD BE BETTER OFF DEAD, OR OF HURTING YOURSELF: NOT AT ALL
SUM OF ALL RESPONSES TO PHQ QUESTIONS 1-9: 6
8. MOVING OR SPEAKING SO SLOWLY THAT OTHER PEOPLE COULD HAVE NOTICED. OR THE OPPOSITE, BEING SO FIGETY OR RESTLESS THAT YOU HAVE BEEN MOVING AROUND A LOT MORE THAN USUAL: NOT AT ALL
1. LITTLE INTEREST OR PLEASURE IN DOING THINGS: NOT AT ALL
SUM OF ALL RESPONSES TO PHQ QUESTIONS 1-9: 6
5. POOR APPETITE OR OVEREATING: NOT AT ALL
2. FEELING DOWN, DEPRESSED OR HOPELESS: MORE THAN HALF THE DAYS

## 2024-12-17 NOTE — PROGRESS NOTES
Medicare Annual Wellness Visit    Ernesto Mg  is here for Medicare AWV (Wants to know why more can't be done for him)    Assessment & Plan   Medicare annual wellness visit, subsequent  Recommendations for Preventive Services Due: see orders and patient instructions/AVS.  Recommended screening schedule for the next 5-10 years is provided to the patient in written form: see Patient Instructions/AVS.     Return in 3 months (on 3/17/2025).     Subjective     Patient's complete Health Risk Assessment and screening values have been reviewed and are found in Flowsheets. The following problems were reviewed today and where indicated follow up appointments were made and/or referrals ordered.    Positive Risk Factor Screenings with Interventions:    Fall Risk:  Do you feel unsteady or are you worried about falling? : (!) yes  2 or more falls in past year?: (!) yes  Fall with injury in past year?: (!) yes     Interventions:    Reviewed medications, home hazards, visual acuity, and co-morbidities that can increase risk for falls. Also discussed using wheeled walker instead of cane, avoiding travel when not with another person, and not wearing sneaker while inside home. Pt inquiring with insurance about wheelchair. He will follow up if mobility worsens.      Depression:  PHQ-2 Score: 2  PHQ-9 Total Score: 6  Total Score Interpretation: 5-9 = mild depression  Interventions:  Patient declines any further evaluation or treatment         Self-assessment of health:  In general, how would you say your health is?: (!) Poor    Interventions:  Pt has visit scheduled for new spine MD on 12/27/2024     Inactivity:  On average, how many days per week do you engage in moderate to strenuous exercise (like a brisk walk)?: 0 days (!) Abnormal  On average, how many minutes do you engage in exercise at this level?: 0 min  Interventions:  See AVS for additional education material       Hearing Screen:  Do you or your family notice any

## 2024-12-17 NOTE — PATIENT INSTRUCTIONS
We are committed to providing you the best care possible.    If you receive a survey after visiting one of our offices, please take time to share your experience concerning your physician office visit.  These surveys are confidential and no health information about you is shared.    We are eager to improve for you and continue to give you satisfactory care, we are counting on your feedback to help make that happen.            Welcome to [facility]:    Did you know we now have a faster way for you to move through your appointment? For your convenience, we now have digital registration available. When you schedule your next appointment, you will receive a link via your email as well as a text message that will allow you to complete any paperwork digitally before your appointment.        Preventing Falls: Care Instructions  Injuries and health problems such as trouble walking or poor eyesight can increase your risk of falling. So can some medicines. But there are things you can do to help prevent falls. You can exercise to get stronger. You can also arrange your home to make it safer.    Talk to your doctor about the medicines you take. Ask if any of them increase the risk of falls and whether they can be changed or stopped.   Try to exercise regularly. It can help improve your strength and balance. This can help lower your risk of falling.         Practice fall safety and prevention.   Wear low-heeled shoes that fit well and give your feet good support. Talk to your doctor if you have foot problems that make this hard.  Carry a cellphone or wear a medical alert device that you can use to call for help.  Use stepladders instead of chairs to reach high objects. Don't climb if you're at risk for falls. Ask for help, if needed.  Wear the correct eyeglasses, if you need them.        Make your home safer.   Remove rugs, cords, clutter, and furniture from walkways.  Keep your house well lit. Use

## 2025-01-23 ENCOUNTER — OFFICE VISIT (OUTPATIENT)
Age: 80
End: 2025-01-23
Payer: MEDICARE

## 2025-01-23 VITALS
RESPIRATION RATE: 18 BRPM | HEART RATE: 76 BPM | BODY MASS INDEX: 25.31 KG/M2 | OXYGEN SATURATION: 98 % | DIASTOLIC BLOOD PRESSURE: 74 MMHG | SYSTOLIC BLOOD PRESSURE: 128 MMHG | WEIGHT: 156.8 LBS

## 2025-01-23 DIAGNOSIS — R26.0 ATAXIC GAIT: Primary | ICD-10-CM

## 2025-01-23 DIAGNOSIS — R41.3 MEMORY DIFFICULTIES: ICD-10-CM

## 2025-01-23 PROCEDURE — G8417 CALC BMI ABV UP PARAM F/U: HCPCS

## 2025-01-23 PROCEDURE — 1123F ACP DISCUSS/DSCN MKR DOCD: CPT

## 2025-01-23 PROCEDURE — 1159F MED LIST DOCD IN RCRD: CPT

## 2025-01-23 PROCEDURE — G8427 DOCREV CUR MEDS BY ELIG CLIN: HCPCS

## 2025-01-23 PROCEDURE — 1036F TOBACCO NON-USER: CPT

## 2025-01-23 PROCEDURE — 99213 OFFICE O/P EST LOW 20 MIN: CPT

## 2025-01-23 SDOH — ECONOMIC STABILITY: FOOD INSECURITY: WITHIN THE PAST 12 MONTHS, YOU WORRIED THAT YOUR FOOD WOULD RUN OUT BEFORE YOU GOT MONEY TO BUY MORE.: NEVER TRUE

## 2025-01-23 SDOH — ECONOMIC STABILITY: FOOD INSECURITY: WITHIN THE PAST 12 MONTHS, THE FOOD YOU BOUGHT JUST DIDN'T LAST AND YOU DIDN'T HAVE MONEY TO GET MORE.: NEVER TRUE

## 2025-01-23 ASSESSMENT — PATIENT HEALTH QUESTIONNAIRE - PHQ9
9. THOUGHTS THAT YOU WOULD BE BETTER OFF DEAD, OR OF HURTING YOURSELF: NOT AT ALL
6. FEELING BAD ABOUT YOURSELF - OR THAT YOU ARE A FAILURE OR HAVE LET YOURSELF OR YOUR FAMILY DOWN: SEVERAL DAYS
2. FEELING DOWN, DEPRESSED OR HOPELESS: SEVERAL DAYS
10. IF YOU CHECKED OFF ANY PROBLEMS, HOW DIFFICULT HAVE THESE PROBLEMS MADE IT FOR YOU TO DO YOUR WORK, TAKE CARE OF THINGS AT HOME, OR GET ALONG WITH OTHER PEOPLE: SOMEWHAT DIFFICULT
SUM OF ALL RESPONSES TO PHQ QUESTIONS 1-9: 7
5. POOR APPETITE OR OVEREATING: SEVERAL DAYS
SUM OF ALL RESPONSES TO PHQ QUESTIONS 1-9: 7
7. TROUBLE CONCENTRATING ON THINGS, SUCH AS READING THE NEWSPAPER OR WATCHING TELEVISION: NOT AT ALL
3. TROUBLE FALLING OR STAYING ASLEEP: SEVERAL DAYS
1. LITTLE INTEREST OR PLEASURE IN DOING THINGS: SEVERAL DAYS
8. MOVING OR SPEAKING SO SLOWLY THAT OTHER PEOPLE COULD HAVE NOTICED. OR THE OPPOSITE, BEING SO FIGETY OR RESTLESS THAT YOU HAVE BEEN MOVING AROUND A LOT MORE THAN USUAL: SEVERAL DAYS
SUM OF ALL RESPONSES TO PHQ QUESTIONS 1-9: 7
4. FEELING TIRED OR HAVING LITTLE ENERGY: SEVERAL DAYS
SUM OF ALL RESPONSES TO PHQ QUESTIONS 1-9: 7
SUM OF ALL RESPONSES TO PHQ9 QUESTIONS 1 & 2: 2

## 2025-01-23 NOTE — PROGRESS NOTES
Ernesto Mg . (:  1945) is a 79 y.o. male,Established patient, here for evaluation of the following chief complaint(s):  Follow-up (Came alone. States he \"feels all screwed up. Head and feet don't work.\" ), Incontinence, Gait Problem (Patient had difficulty walking to exam room. Shuffles when walking.), and Memory Loss (States he is having difficulty remembering )      Subjective   Pt here for routine follow up. Since last OV, pt has seen neurosurgery at [facility]. OV note reviewed.  MRI was to be done for further workup of current abnormal gait and memory issues.  Patient reports MRI was scheduled but he became ill and had to cancel imaging.  He has not rescheduled at this time.  Patient reports symptoms have not changed in severity or frequency since last OV.  He states he is using a walker when able and has cane if he is unable to use walker.  Current cane is single post bottom.             2025    11:20 AM 2024    10:34 AM 2024    10:41 AM 5/15/2024    11:20 AM 3/6/2024    10:58 AM 10/19/2023    10:36 AM 2023    10:15 AM   PHQ Scores   PHQ2 Score 2 2 6 0 3 4 5   PHQ9 Score 7 6 25 5 7 9 21         I reviewed the medical records and past history for Ernesto.    Allergies   Allergen Reactions    Other      \"Allergic To Hay, Grass, Perfume, Candle Scents, Crop Dust Causing Problems Breathing, Swallowing, Hoarseness Of Voice\"    Oxycodone-Acetaminophen Itching    Percocet [Oxycodone-Acetaminophen] Itching     \"Feels Like Bugs Crawling On Me\"       Current Outpatient Medications   Medication Sig Dispense Refill    Misc. Devices (ROLLATOR ULTRA-LIGHT) MISC Use as directed 1 each 0    tamsulosin (FLOMAX) 0.4 MG capsule Take 1 capsule by mouth daily 90 capsule 1    albuterol sulfate HFA (VENTOLIN HFA) 108 (90 Base) MCG/ACT inhaler Inhale 2 puffs into the lungs 4 times daily as needed for Wheezing 54 g 1    Chlorpheniramine Maleate (ALLERGY PO) Take by mouth      Cholecalciferol

## 2025-01-24 ENCOUNTER — HOSPITAL ENCOUNTER (OUTPATIENT)
Dept: PHYSICAL THERAPY | Age: 80
Setting detail: THERAPIES SERIES
Discharge: HOME | End: 2025-01-24
Payer: MEDICARE

## 2025-01-24 PROCEDURE — 97530 THERAPEUTIC ACTIVITIES: CPT

## 2025-01-24 PROCEDURE — 97162 PT EVAL MOD COMPLEX 30 MIN: CPT

## 2025-01-24 PROCEDURE — 97110 THERAPEUTIC EXERCISES: CPT
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
## 2025-01-27 ASSESSMENT — ENCOUNTER SYMPTOMS
RESPIRATORY NEGATIVE: 1
GASTROINTESTINAL NEGATIVE: 1

## 2025-01-28 ENCOUNTER — HOSPITAL ENCOUNTER (OUTPATIENT)
Dept: PHYSICAL THERAPY | Age: 80
Setting detail: THERAPIES SERIES
Discharge: HOME | End: 2025-01-28
Payer: MEDICARE

## 2025-01-28 PROCEDURE — 97116 GAIT TRAINING THERAPY: CPT

## 2025-01-28 PROCEDURE — 97110 THERAPEUTIC EXERCISES: CPT
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
## 2025-01-29 NOTE — PRE-CERTIFICATION NOTE
Patient approved for 10 visits from 1/24/25 to 3/31/25.    Cpt codes approved:  33270  94075  28947  [id number]  [id number]    Auth# [id number]     Was not done

## 2025-02-04 ENCOUNTER — HOSPITAL ENCOUNTER (OUTPATIENT)
Dept: PHYSICAL THERAPY | Age: 80
Setting detail: THERAPIES SERIES
Discharge: HOME | End: 2025-02-04
Payer: MEDICARE

## 2025-02-04 PROCEDURE — 97110 THERAPEUTIC EXERCISES: CPT

## 2025-02-04 PROCEDURE — 97116 GAIT TRAINING THERAPY: CPT

## 2025-02-04 PROCEDURE — 97112 NEUROMUSCULAR REEDUCATION: CPT
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
## 2025-02-11 ENCOUNTER — HOSPITAL ENCOUNTER (OUTPATIENT)
Dept: PHYSICAL THERAPY | Age: 80
Setting detail: THERAPIES SERIES
Discharge: HOME | End: 2025-02-11
Payer: MEDICARE

## 2025-02-11 PROCEDURE — 97112 NEUROMUSCULAR REEDUCATION: CPT

## 2025-02-11 PROCEDURE — 97110 THERAPEUTIC EXERCISES: CPT

## 2025-02-11 PROCEDURE — 97116 GAIT TRAINING THERAPY: CPT
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
## 2025-02-18 ENCOUNTER — APPOINTMENT (OUTPATIENT)
Dept: PHYSICAL THERAPY | Age: 80
End: 2025-02-18
Payer: MEDICARE

## 2025-03-13 ENCOUNTER — TELEPHONE (OUTPATIENT)
Age: 80
End: 2025-03-13

## 2025-03-14 NOTE — TELEPHONE ENCOUNTER
KRISTINA murphy stopped by the office with a copy of MRI results from [facility] for patient. This female was upset due to wanting results addressed since it was done in Feb. Please advise.

## 2025-04-23 ENCOUNTER — OFFICE VISIT (OUTPATIENT)
Age: 80
End: 2025-04-23
Payer: MEDICARE

## 2025-04-23 VITALS
SYSTOLIC BLOOD PRESSURE: 120 MMHG | BODY MASS INDEX: 25.11 KG/M2 | RESPIRATION RATE: 20 BRPM | DIASTOLIC BLOOD PRESSURE: 68 MMHG | OXYGEN SATURATION: 97 % | WEIGHT: 155.6 LBS | HEART RATE: 74 BPM

## 2025-04-23 DIAGNOSIS — R15.9 BOWEL AND BLADDER INCONTINENCE: ICD-10-CM

## 2025-04-23 DIAGNOSIS — N40.1 BENIGN PROSTATIC HYPERPLASIA WITH URINARY FREQUENCY: ICD-10-CM

## 2025-04-23 DIAGNOSIS — M54.16 LUMBAR RADICULOPATHY: ICD-10-CM

## 2025-04-23 DIAGNOSIS — R32 BOWEL AND BLADDER INCONTINENCE: ICD-10-CM

## 2025-04-23 DIAGNOSIS — R09.82 PND (POST-NASAL DRIP): ICD-10-CM

## 2025-04-23 DIAGNOSIS — R35.0 BENIGN PROSTATIC HYPERPLASIA WITH URINARY FREQUENCY: ICD-10-CM

## 2025-04-23 DIAGNOSIS — R26.0 ATAXIC GAIT: ICD-10-CM

## 2025-04-23 DIAGNOSIS — R26.9 ABNORMAL GAIT: Primary | ICD-10-CM

## 2025-04-23 PROCEDURE — 99214 OFFICE O/P EST MOD 30 MIN: CPT

## 2025-04-23 PROCEDURE — 1160F RVW MEDS BY RX/DR IN RCRD: CPT

## 2025-04-23 PROCEDURE — 1036F TOBACCO NON-USER: CPT

## 2025-04-23 PROCEDURE — G8427 DOCREV CUR MEDS BY ELIG CLIN: HCPCS

## 2025-04-23 PROCEDURE — 1123F ACP DISCUSS/DSCN MKR DOCD: CPT

## 2025-04-23 PROCEDURE — G8417 CALC BMI ABV UP PARAM F/U: HCPCS

## 2025-04-23 PROCEDURE — 1159F MED LIST DOCD IN RCRD: CPT

## 2025-04-23 RX ORDER — TAMSULOSIN HYDROCHLORIDE 0.4 MG/1
0.4 CAPSULE ORAL DAILY
Qty: 90 CAPSULE | Refills: 1 | Status: SHIPPED | OUTPATIENT
Start: 2025-04-23

## 2025-04-23 ASSESSMENT — ENCOUNTER SYMPTOMS: RESPIRATORY NEGATIVE: 1

## 2025-04-23 NOTE — PROGRESS NOTES
Ernesto Mg Sr. (:  1945) is a 79 y.o. male,Established patient, here for evaluation of the following chief complaint(s):  Follow-up (Concerns for walking. Often trips and falls. Incontinence with urine and stools. Forgetful and eyes \"play tricks on him\". Has itchy bumps under skin on left bicep.)      Subjective   HPI  History of Present Illness  The patient is an 79-year-old male here for a routine follow-up.    He underwent an MRI in 2025, with a scheduled appointment with neurosurgery to discuss the results on 2025. Persistent symptoms have been experienced for the past 2 years. Mobility is limited, primarily confined to the home, with occasional outings to [facility] where a cart is utilized for support. A walker is used infrequently outside the house but relied upon within the home. Walking distance is minimal, and shoes are removed for fall risk. The walker provides more stability than a cane.    Urinary incontinence is reported, necessitating immediate access to a restroom. This issue has been ongoing for some time but has recently worsened. Flomax has not been taken recently. Occasional bowel incontinence is also reported, occurring twice in the past 3 months. Daily urinary incontinence is not experienced, but it occurs approximately once a week. No specific triggers for incontinence, such as certain foods, are reported, and constipation is not experienced. Denies any blood in stool and urine.     He is talking to his eye doctor about getting injections in his eyes.        2025    11:20 AM 2024    10:34 AM 2024    10:41 AM 5/15/2024    11:20 AM 3/6/2024    10:58 AM 10/19/2023    10:36 AM 2023    10:15 AM   PHQ Scores   PHQ2 Score 2 2 6 0 3 4 5   PHQ9 Score 7 6 25 5 7 9 21         I reviewed the medical records and past history for Ernesto.    Allergies   Allergen Reactions    Other      \"Allergic To Hay, Grass, Perfume, Candle Scents, Crop Dust Causing Problems

## 2025-05-01 ENCOUNTER — TELEPHONE (OUTPATIENT)
Dept: NEUROSURGERY | Age: 80
End: 2025-05-01

## 2025-05-01 NOTE — TELEPHONE ENCOUNTER
Spoke w pt regarding nsg referral. He stated he is already being seen by [facility].

## 2025-05-27 ENCOUNTER — TELEPHONE (OUTPATIENT)
Dept: PULMONOLOGY | Age: 80
End: 2025-05-27
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
## 2025-06-03 ENCOUNTER — TELEPHONE (OUTPATIENT)
Dept: PULMONOLOGY | Age: 80
End: 2025-06-03

## 2025-06-03 NOTE — TELEPHONE ENCOUNTER
Gene Called requesting a verbal for pulmonary clearance since [facility]'s system was still down.    Verbal pulmonary clearance given, form scanned into , also Yan[place], Lead BURT stated patient's wife came and picked up a copy of the clearance form as well.

## 2025-08-25 ENCOUNTER — TELEPHONE (OUTPATIENT)
Age: 80
End: 2025-08-25

## (undated) DEVICE — CORD RETRCT SIL

## (undated) DEVICE — ANESTHESIA CIRCUIT ADULT-LF: Brand: MEDLINE INDUSTRIES, INC.

## (undated) DEVICE — ELECTRODE ES AD CRDLSS PT RET REM POLYHESIVE

## (undated) DEVICE — BANDAGE,GAUZE,BULKEE II,4.5"X4.1YD,STRL: Brand: MEDLINE

## (undated) DEVICE — GLOVE SURG SZ 8 L12IN THK75MIL DK GRN LTX FREE

## (undated) DEVICE — LINER SUCT CANSTR 1500CC SEMI RIG W/ POR HYDROPHOBIC SHUT

## (undated) DEVICE — SUTURE STRATAFIX SPRL SZ 1 L14IN ABSRB VLT L48CM CTX 1/2 SXPD2B405

## (undated) DEVICE — BANDAGE,ELASTIC,ESMARK,STERILE,6"X9',LF: Brand: MEDLINE

## (undated) DEVICE — 3M™ STERI-DRAPE™ U-DRAPE 1015: Brand: STERI-DRAPE™

## (undated) DEVICE — 4-PORT MANIFOLD: Brand: NEPTUNE 2

## (undated) DEVICE — APPLICATOR MEDICATED 26 CC SOLUTION HI LT ORNG CHLORAPREP

## (undated) DEVICE — KIT TRK KNEE PROC VIZADISC

## (undated) DEVICE — SUTURE ETHLN SZ 3-0 L30IN NONABSORBABLE BLK FS-1 L24MM 3/8 669H

## (undated) DEVICE — SUTURE ABSORBABLE 3-0 PS-1 18 IN UD MONOCRYL + STRATAFIX SXMP1B102

## (undated) DEVICE — ARTHROSCOPY PACK: Brand: MEDLINE INDUSTRIES, INC.

## (undated) DEVICE — COVER,TABLE,44X90,STERILE: Brand: MEDLINE

## (undated) DEVICE — DUAL CUT SAGITTAL BLADE

## (undated) DEVICE — SURGICAL PROCEDURE PACK TOT KNEE LF

## (undated) DEVICE — CUTTER AGGR PLUS RD/BL 4MM

## (undated) DEVICE — GLOVE SURG SZ 7 CRM LTX FREE POLYISOPRENE POLYMER BEAD ANTI

## (undated) DEVICE — CURITY NON-ADHERENT STRIPS: Brand: CURITY

## (undated) DEVICE — SYSTEM SKIN CLSR 22CM DERMBND PRINEO

## (undated) DEVICE — YANKAUER,FLEXIBLE HANDLE,REGLR CAPACITY: Brand: MEDLINE INDUSTRIES, INC.

## (undated) DEVICE — COUNTER NDL 60 COUNT FOAM STRP SGL MAG

## (undated) DEVICE — SUTURE STARTAFIX 3-0 PS-1 30CM

## (undated) DEVICE — ELECTRODE,RADIOTRANSLUCENT,FOAM,5PK: Brand: MEDLINE

## (undated) DEVICE — SKIN MARKER REGULAR TIP WITH RULER CAP AND LABELS: Brand: DEVON

## (undated) DEVICE — GLOVE ORANGE PI 7 1/2   MSG9075

## (undated) DEVICE — KIT POS PUR KNEE

## (undated) DEVICE — TUBING, SUCTION, 1/4" X 10', STRAIGHT: Brand: MEDLINE

## (undated) DEVICE — BLANKET WRM W29.9XL79.1IN UP BODY FORC AIR MISTRAL-AIR

## (undated) DEVICE — PIN BNE FIX TEMP L140MM DIA4MM MAKO

## (undated) DEVICE — ZIMMER® STERILE DISPOSABLE TOURNIQUET CUFF WITH PLC, DUAL PORT, SINGLE BLADDER, 30 IN. (76 CM)

## (undated) DEVICE — SUTURE STRATAFIX SYMMETRIC SZ 1 L18IN ABSRB VLT CT1 L36CM SXPP1A404

## (undated) DEVICE — BLADE CLIPPER GEN PURP NS

## (undated) DEVICE — CHLORAPREP 26ML ORANGE

## (undated) DEVICE — PAD,ABDOMINAL,5"X9",ST,LF,25/BX: Brand: MEDLINE INDUSTRIES, INC.

## (undated) DEVICE — COTTON UNDERCAST PADDING,REGULAR FINISH: Brand: WEBRIL

## (undated) DEVICE — KIT INT FIX FEM TIB CKPT MAKOPLASTY

## (undated) DEVICE — BLADE SURG SAW STD S STL OSC W/ SERR EDGE DISP

## (undated) DEVICE — KIT DRP FOR RIO ROBOTIC ARM ASST SYS

## (undated) DEVICE — BANDAGE COMPR M W6INXL10YD WHT BGE VELC E MTRX HK AND LOOP

## (undated) DEVICE — Device: Brand: POWER-FLO®

## (undated) DEVICE — SOLUTION PREP POVIDONE IOD FOR SKIN MUCOUS MEM PRIOR TO

## (undated) DEVICE — PAD CLD R HIP REG HOSE NONSTERILE

## (undated) DEVICE — TUBING PMP L16FT MAIN DISP FOR AR-6400 AR-6475

## (undated) DEVICE — Z INACTIVE USE 2660664 SOLUTION IRRIG 3000ML 0.9% SOD CHL USP UROMATIC PLAS CONT

## (undated) DEVICE — BOOT POS LEG DEMAYO

## (undated) DEVICE — TOWEL,OR,DSP,ST,BLUE,STD,6/PK,12PK/CS: Brand: MEDLINE

## (undated) DEVICE — 6619 2 PTNT ISO SYS INCISE AREA&LT;(&GT;&&LT;)&GT;P: Brand: STERI-DRAPE™ IOBAN™ 2

## (undated) DEVICE — HOOD, PEEL-AWAY: Brand: FLYTE

## (undated) DEVICE — WEREWOLF FLOW 50 COBLATION WAND: Brand: COBLATION

## (undated) DEVICE — SUTURE ETHBND SZ 1 L30IN NONABSORBABLE GRN OS-6 L36MM 1/2 X538H

## (undated) DEVICE — DRESSING TRNSPAR W2XL2.75IN FLM SHT SEMIPERMEABLE WIND

## (undated) DEVICE — SOLUTION IV IRRIG POUR BRL 0.9% SODIUM CHL 2F7124